# Patient Record
Sex: FEMALE | Race: WHITE | NOT HISPANIC OR LATINO | Employment: UNEMPLOYED | URBAN - METROPOLITAN AREA
[De-identification: names, ages, dates, MRNs, and addresses within clinical notes are randomized per-mention and may not be internally consistent; named-entity substitution may affect disease eponyms.]

---

## 2020-10-19 ENCOUNTER — TRANSCRIBE ORDERS (OUTPATIENT)
Dept: LAB | Facility: CLINIC | Age: 60
End: 2020-10-19

## 2020-10-19 ENCOUNTER — LAB (OUTPATIENT)
Dept: LAB | Facility: CLINIC | Age: 60
End: 2020-10-19

## 2020-10-19 DIAGNOSIS — E03.9 PRIMARY HYPOTHYROIDISM: ICD-10-CM

## 2020-10-19 DIAGNOSIS — Z00.00 ROUTINE GENERAL MEDICAL EXAMINATION AT A HEALTH CARE FACILITY: ICD-10-CM

## 2020-10-19 DIAGNOSIS — Z00.00 ROUTINE GENERAL MEDICAL EXAMINATION AT A HEALTH CARE FACILITY: Primary | ICD-10-CM

## 2020-10-19 DIAGNOSIS — E11.9 DIABETES MELLITUS, STABLE (HCC): ICD-10-CM

## 2020-10-19 LAB
ALBUMIN SERPL BCP-MCNC: 4.3 G/DL (ref 3.5–5)
ALP SERPL-CCNC: 92 U/L (ref 46–116)
ALT SERPL W P-5'-P-CCNC: 108 U/L (ref 12–78)
ANION GAP SERPL CALCULATED.3IONS-SCNC: 6 MMOL/L (ref 4–13)
AST SERPL W P-5'-P-CCNC: 46 U/L (ref 5–45)
BASOPHILS # BLD AUTO: 0.08 THOUSANDS/ΜL (ref 0–0.1)
BASOPHILS NFR BLD AUTO: 1 % (ref 0–1)
BILIRUB SERPL-MCNC: 0.58 MG/DL (ref 0.2–1)
BUN SERPL-MCNC: 14 MG/DL (ref 5–25)
CALCIUM SERPL-MCNC: 9.6 MG/DL (ref 8.3–10.1)
CHLORIDE SERPL-SCNC: 106 MMOL/L (ref 100–108)
CHOLEST SERPL-MCNC: 256 MG/DL (ref 50–200)
CO2 SERPL-SCNC: 28 MMOL/L (ref 21–32)
CREAT SERPL-MCNC: 0.73 MG/DL (ref 0.6–1.3)
EOSINOPHIL # BLD AUTO: 0.22 THOUSAND/ΜL (ref 0–0.61)
EOSINOPHIL NFR BLD AUTO: 3 % (ref 0–6)
ERYTHROCYTE [DISTWIDTH] IN BLOOD BY AUTOMATED COUNT: 11.8 % (ref 11.6–15.1)
GFR SERPL CREATININE-BSD FRML MDRD: 90 ML/MIN/1.73SQ M
GLUCOSE P FAST SERPL-MCNC: 104 MG/DL (ref 65–99)
HCT VFR BLD AUTO: 44.1 % (ref 34.8–46.1)
HDLC SERPL-MCNC: 35 MG/DL
HGB BLD-MCNC: 15.1 G/DL (ref 11.5–15.4)
IMM GRANULOCYTES # BLD AUTO: 0.01 THOUSAND/UL (ref 0–0.2)
IMM GRANULOCYTES NFR BLD AUTO: 0 % (ref 0–2)
LDLC SERPL CALC-MCNC: 149 MG/DL (ref 0–100)
LYMPHOCYTES # BLD AUTO: 2.83 THOUSANDS/ΜL (ref 0.6–4.47)
LYMPHOCYTES NFR BLD AUTO: 44 % (ref 14–44)
MCH RBC QN AUTO: 31.6 PG (ref 26.8–34.3)
MCHC RBC AUTO-ENTMCNC: 34.2 G/DL (ref 31.4–37.4)
MCV RBC AUTO: 92 FL (ref 82–98)
MONOCYTES # BLD AUTO: 0.79 THOUSAND/ΜL (ref 0.17–1.22)
MONOCYTES NFR BLD AUTO: 12 % (ref 4–12)
NEUTROPHILS # BLD AUTO: 2.61 THOUSANDS/ΜL (ref 1.85–7.62)
NEUTS SEG NFR BLD AUTO: 40 % (ref 43–75)
NONHDLC SERPL-MCNC: 221 MG/DL
NRBC BLD AUTO-RTO: 0 /100 WBCS
PLATELET # BLD AUTO: 323 THOUSANDS/UL (ref 149–390)
PMV BLD AUTO: 10.7 FL (ref 8.9–12.7)
POTASSIUM SERPL-SCNC: 3.7 MMOL/L (ref 3.5–5.3)
PROT SERPL-MCNC: 8.8 G/DL (ref 6.4–8.2)
RBC # BLD AUTO: 4.78 MILLION/UL (ref 3.81–5.12)
SODIUM SERPL-SCNC: 140 MMOL/L (ref 136–145)
TRIGL SERPL-MCNC: 360 MG/DL
TSH SERPL DL<=0.05 MIU/L-ACNC: 5.55 UIU/ML (ref 0.36–3.74)
WBC # BLD AUTO: 6.54 THOUSAND/UL (ref 4.31–10.16)

## 2020-10-19 PROCEDURE — 84443 ASSAY THYROID STIM HORMONE: CPT

## 2020-10-19 PROCEDURE — 85025 COMPLETE CBC W/AUTO DIFF WBC: CPT

## 2020-10-19 PROCEDURE — 80053 COMPREHEN METABOLIC PANEL: CPT

## 2020-10-19 PROCEDURE — 80061 LIPID PANEL: CPT

## 2020-10-19 PROCEDURE — 36415 COLL VENOUS BLD VENIPUNCTURE: CPT

## 2020-11-12 PROBLEM — R55 SYNCOPE AND COLLAPSE: Status: ACTIVE | Noted: 2020-11-12

## 2020-11-12 PROBLEM — E78.2 MIXED HYPERLIPIDEMIA: Status: ACTIVE | Noted: 2020-11-12

## 2020-11-16 ENCOUNTER — OFFICE VISIT (OUTPATIENT)
Dept: CARDIOLOGY CLINIC | Facility: CLINIC | Age: 60
End: 2020-11-16

## 2020-11-16 VITALS
HEIGHT: 63 IN | TEMPERATURE: 97.4 F | HEART RATE: 77 BPM | DIASTOLIC BLOOD PRESSURE: 84 MMHG | RESPIRATION RATE: 16 BRPM | SYSTOLIC BLOOD PRESSURE: 142 MMHG | WEIGHT: 156.2 LBS | BODY MASS INDEX: 27.68 KG/M2

## 2020-11-16 DIAGNOSIS — R55 SYNCOPE AND COLLAPSE: Primary | ICD-10-CM

## 2020-11-16 PROCEDURE — 99243 OFF/OP CNSLTJ NEW/EST LOW 30: CPT | Performed by: INTERNAL MEDICINE

## 2020-11-16 PROCEDURE — 93000 ELECTROCARDIOGRAM COMPLETE: CPT | Performed by: INTERNAL MEDICINE

## 2020-11-16 RX ORDER — MULTIVIT-MIN/IRON FUM/FOLIC AC 7.5 MG-4
1 TABLET ORAL DAILY
COMMUNITY

## 2020-11-16 RX ORDER — MELATONIN
1000 DAILY
COMMUNITY

## 2020-11-16 RX ORDER — LISINOPRIL 10 MG/1
10 TABLET ORAL DAILY
COMMUNITY
Start: 2020-10-29

## 2020-11-16 RX ORDER — MULTIVIT WITH MINERALS/LUTEIN
1000 TABLET ORAL DAILY
COMMUNITY

## 2020-11-16 RX ORDER — OMEPRAZOLE 20 MG/1
20 CAPSULE, DELAYED RELEASE ORAL DAILY
COMMUNITY

## 2020-11-16 RX ORDER — MECLIZINE HYDROCHLORIDE 25 MG/1
25 TABLET ORAL 3 TIMES DAILY PRN
COMMUNITY
Start: 2020-11-05

## 2021-04-09 DIAGNOSIS — Z23 ENCOUNTER FOR IMMUNIZATION: ICD-10-CM

## 2021-04-20 ENCOUNTER — IMMUNIZATIONS (OUTPATIENT)
Dept: FAMILY MEDICINE CLINIC | Facility: HOSPITAL | Age: 61
End: 2021-04-20

## 2021-04-20 DIAGNOSIS — Z23 ENCOUNTER FOR IMMUNIZATION: Primary | ICD-10-CM

## 2021-04-20 PROCEDURE — 91300 SARS-COV-2 / COVID-19 MRNA VACCINE (PFIZER-BIONTECH) 30 MCG: CPT

## 2021-04-20 PROCEDURE — 0001A SARS-COV-2 / COVID-19 MRNA VACCINE (PFIZER-BIONTECH) 30 MCG: CPT

## 2021-05-12 ENCOUNTER — IMMUNIZATIONS (OUTPATIENT)
Dept: FAMILY MEDICINE CLINIC | Facility: HOSPITAL | Age: 61
End: 2021-05-12

## 2021-05-12 DIAGNOSIS — Z23 ENCOUNTER FOR IMMUNIZATION: Primary | ICD-10-CM

## 2021-05-12 PROCEDURE — 0002A SARS-COV-2 / COVID-19 MRNA VACCINE (PFIZER-BIONTECH) 30 MCG: CPT

## 2021-05-12 PROCEDURE — 91300 SARS-COV-2 / COVID-19 MRNA VACCINE (PFIZER-BIONTECH) 30 MCG: CPT

## 2021-06-04 ENCOUNTER — APPOINTMENT (OUTPATIENT)
Dept: LAB | Facility: CLINIC | Age: 61
End: 2021-06-04

## 2021-06-04 ENCOUNTER — TRANSCRIBE ORDERS (OUTPATIENT)
Dept: LAB | Facility: CLINIC | Age: 61
End: 2021-06-04

## 2021-06-04 DIAGNOSIS — E78.5 HYPERLIPIDEMIA, UNSPECIFIED HYPERLIPIDEMIA TYPE: ICD-10-CM

## 2021-06-04 DIAGNOSIS — I10 ESSENTIAL HYPERTENSION, MALIGNANT: ICD-10-CM

## 2021-06-04 DIAGNOSIS — I10 ESSENTIAL HYPERTENSION, MALIGNANT: Primary | ICD-10-CM

## 2021-06-04 LAB
ALBUMIN SERPL BCP-MCNC: 4.2 G/DL (ref 3.5–5)
ALP SERPL-CCNC: 87 U/L (ref 46–116)
ALT SERPL W P-5'-P-CCNC: 66 U/L (ref 12–78)
ANION GAP SERPL CALCULATED.3IONS-SCNC: 6 MMOL/L (ref 4–13)
AST SERPL W P-5'-P-CCNC: 29 U/L (ref 5–45)
BILIRUB SERPL-MCNC: 0.56 MG/DL (ref 0.2–1)
BUN SERPL-MCNC: 11 MG/DL (ref 5–25)
CALCIUM SERPL-MCNC: 9.7 MG/DL (ref 8.3–10.1)
CHLORIDE SERPL-SCNC: 102 MMOL/L (ref 100–108)
CHOLEST SERPL-MCNC: 260 MG/DL (ref 50–200)
CO2 SERPL-SCNC: 31 MMOL/L (ref 21–32)
CREAT SERPL-MCNC: 0.7 MG/DL (ref 0.6–1.3)
GFR SERPL CREATININE-BSD FRML MDRD: 94 ML/MIN/1.73SQ M
GLUCOSE P FAST SERPL-MCNC: 103 MG/DL (ref 65–99)
HDLC SERPL-MCNC: 31 MG/DL
LDLC SERPL CALC-MCNC: 159 MG/DL (ref 0–100)
NONHDLC SERPL-MCNC: 229 MG/DL
POTASSIUM SERPL-SCNC: 3.8 MMOL/L (ref 3.5–5.3)
PROT SERPL-MCNC: 8.2 G/DL (ref 6.4–8.2)
SODIUM SERPL-SCNC: 139 MMOL/L (ref 136–145)
TRIGL SERPL-MCNC: 351 MG/DL

## 2021-06-04 PROCEDURE — 80061 LIPID PANEL: CPT

## 2021-06-04 PROCEDURE — 80053 COMPREHEN METABOLIC PANEL: CPT

## 2021-06-04 PROCEDURE — 36415 COLL VENOUS BLD VENIPUNCTURE: CPT

## 2021-09-30 ENCOUNTER — APPOINTMENT (OUTPATIENT)
Dept: LAB | Facility: CLINIC | Age: 61
End: 2021-09-30

## 2021-09-30 DIAGNOSIS — E78.5 HYPERLIPIDEMIA, UNSPECIFIED HYPERLIPIDEMIA TYPE: ICD-10-CM

## 2021-09-30 DIAGNOSIS — I10 ESSENTIAL HYPERTENSION, MALIGNANT: ICD-10-CM

## 2021-09-30 LAB
ALT SERPL W P-5'-P-CCNC: 43 U/L (ref 12–78)
AST SERPL W P-5'-P-CCNC: 17 U/L (ref 5–45)
CHOLEST SERPL-MCNC: 146 MG/DL (ref 50–200)
HDLC SERPL-MCNC: 32 MG/DL
LDLC SERPL CALC-MCNC: 79 MG/DL (ref 0–100)
NONHDLC SERPL-MCNC: 114 MG/DL
TRIGL SERPL-MCNC: 175 MG/DL

## 2021-09-30 PROCEDURE — 84460 ALANINE AMINO (ALT) (SGPT): CPT

## 2021-09-30 PROCEDURE — 84450 TRANSFERASE (AST) (SGOT): CPT

## 2021-09-30 PROCEDURE — 80061 LIPID PANEL: CPT

## 2021-09-30 PROCEDURE — 36415 COLL VENOUS BLD VENIPUNCTURE: CPT

## 2022-06-07 ENCOUNTER — APPOINTMENT (OUTPATIENT)
Dept: LAB | Facility: CLINIC | Age: 62
End: 2022-06-07

## 2022-06-07 DIAGNOSIS — E78.5 HYPERLIPIDEMIA, UNSPECIFIED HYPERLIPIDEMIA TYPE: ICD-10-CM

## 2022-06-07 DIAGNOSIS — Z00.00 ROUTINE GENERAL MEDICAL EXAMINATION AT A HEALTH CARE FACILITY: ICD-10-CM

## 2022-06-07 DIAGNOSIS — R73.01 IMPAIRED FASTING GLUCOSE: ICD-10-CM

## 2022-06-07 DIAGNOSIS — E03.9 HYPOTHYROIDISM, UNSPECIFIED TYPE: ICD-10-CM

## 2022-06-07 LAB
ALBUMIN SERPL BCP-MCNC: 4 G/DL (ref 3.5–5)
ALP SERPL-CCNC: 79 U/L (ref 46–116)
ALT SERPL W P-5'-P-CCNC: 42 U/L (ref 12–78)
ANION GAP SERPL CALCULATED.3IONS-SCNC: 6 MMOL/L (ref 4–13)
AST SERPL W P-5'-P-CCNC: 14 U/L (ref 5–45)
BASOPHILS # BLD AUTO: 0.06 THOUSANDS/ΜL (ref 0–0.1)
BASOPHILS NFR BLD AUTO: 1 % (ref 0–1)
BILIRUB SERPL-MCNC: 0.65 MG/DL (ref 0.2–1)
BUN SERPL-MCNC: 10 MG/DL (ref 5–25)
CALCIUM SERPL-MCNC: 10 MG/DL (ref 8.3–10.1)
CHLORIDE SERPL-SCNC: 105 MMOL/L (ref 100–108)
CO2 SERPL-SCNC: 29 MMOL/L (ref 21–32)
CREAT SERPL-MCNC: 0.68 MG/DL (ref 0.6–1.3)
EOSINOPHIL # BLD AUTO: 0.25 THOUSAND/ΜL (ref 0–0.61)
EOSINOPHIL NFR BLD AUTO: 3 % (ref 0–6)
ERYTHROCYTE [DISTWIDTH] IN BLOOD BY AUTOMATED COUNT: 11.7 % (ref 11.6–15.1)
EST. AVERAGE GLUCOSE BLD GHB EST-MCNC: 117 MG/DL
GFR SERPL CREATININE-BSD FRML MDRD: 94 ML/MIN/1.73SQ M
GLUCOSE P FAST SERPL-MCNC: 100 MG/DL (ref 65–99)
HBA1C MFR BLD: 5.7 %
HCT VFR BLD AUTO: 42.7 % (ref 34.8–46.1)
HGB BLD-MCNC: 14.4 G/DL (ref 11.5–15.4)
IMM GRANULOCYTES # BLD AUTO: 0.02 THOUSAND/UL (ref 0–0.2)
IMM GRANULOCYTES NFR BLD AUTO: 0 % (ref 0–2)
LYMPHOCYTES # BLD AUTO: 2.69 THOUSANDS/ΜL (ref 0.6–4.47)
LYMPHOCYTES NFR BLD AUTO: 33 % (ref 14–44)
MCH RBC QN AUTO: 30.5 PG (ref 26.8–34.3)
MCHC RBC AUTO-ENTMCNC: 33.7 G/DL (ref 31.4–37.4)
MCV RBC AUTO: 91 FL (ref 82–98)
MONOCYTES # BLD AUTO: 0.72 THOUSAND/ΜL (ref 0.17–1.22)
MONOCYTES NFR BLD AUTO: 9 % (ref 4–12)
NEUTROPHILS # BLD AUTO: 4.36 THOUSANDS/ΜL (ref 1.85–7.62)
NEUTS SEG NFR BLD AUTO: 54 % (ref 43–75)
NRBC BLD AUTO-RTO: 0 /100 WBCS
PLATELET # BLD AUTO: 329 THOUSANDS/UL (ref 149–390)
PMV BLD AUTO: 10 FL (ref 8.9–12.7)
POTASSIUM SERPL-SCNC: 4 MMOL/L (ref 3.5–5.3)
PROT SERPL-MCNC: 8.3 G/DL (ref 6.4–8.2)
RBC # BLD AUTO: 4.72 MILLION/UL (ref 3.81–5.12)
SODIUM SERPL-SCNC: 140 MMOL/L (ref 136–145)
TSH SERPL DL<=0.05 MIU/L-ACNC: 7.02 UIU/ML (ref 0.45–4.5)
WBC # BLD AUTO: 8.1 THOUSAND/UL (ref 4.31–10.16)

## 2022-06-07 PROCEDURE — 83036 HEMOGLOBIN GLYCOSYLATED A1C: CPT

## 2022-06-07 PROCEDURE — 85025 COMPLETE CBC W/AUTO DIFF WBC: CPT

## 2022-06-07 PROCEDURE — 80053 COMPREHEN METABOLIC PANEL: CPT

## 2022-06-07 PROCEDURE — 84443 ASSAY THYROID STIM HORMONE: CPT

## 2022-06-07 PROCEDURE — 36415 COLL VENOUS BLD VENIPUNCTURE: CPT

## 2022-09-19 ENCOUNTER — APPOINTMENT (OUTPATIENT)
Dept: LAB | Facility: CLINIC | Age: 62
End: 2022-09-19

## 2022-09-19 DIAGNOSIS — E03.9 MYXEDEMA HEART DISEASE: ICD-10-CM

## 2022-09-19 DIAGNOSIS — I51.9 MYXEDEMA HEART DISEASE: ICD-10-CM

## 2022-09-19 LAB — TSH SERPL DL<=0.05 MIU/L-ACNC: 0.04 UIU/ML (ref 0.45–4.5)

## 2022-09-19 PROCEDURE — 36415 COLL VENOUS BLD VENIPUNCTURE: CPT

## 2022-09-19 PROCEDURE — 84443 ASSAY THYROID STIM HORMONE: CPT

## 2022-10-24 ENCOUNTER — TELEPHONE (OUTPATIENT)
Dept: NEUROLOGY | Facility: CLINIC | Age: 62
End: 2022-10-24

## 2022-10-24 NOTE — TELEPHONE ENCOUNTER
THE [facility] to confirm patient's 10/27/2022 @ 2 pm appointment with Dr Mikala Reid at the [facility]  Call back number given [phone]

## 2023-06-21 ENCOUNTER — TELEPHONE (OUTPATIENT)
Dept: OTHER | Facility: OTHER | Age: 63
End: 2023-06-21

## 2023-06-21 NOTE — TELEPHONE ENCOUNTER
Cyndi Fried  [unfilled]  [unfilled]  [phone]  [email or web address]  English  English  Amelia Rankin MD     Mammogram Screening [facility] locations only):  Pap smear screening (Clinic vs [facility]):  PCP (Clinic vs [facility]):       Transportation:     Education: Left message with Ottoniel

## 2023-09-07 ENCOUNTER — APPOINTMENT (OUTPATIENT)
Dept: LAB | Facility: CLINIC | Age: 63
End: 2023-09-07

## 2023-09-07 ENCOUNTER — TRANSCRIBE ORDERS (OUTPATIENT)
Dept: LAB | Facility: CLINIC | Age: 63
End: 2023-09-07

## 2023-09-07 DIAGNOSIS — I51.9 MYXEDEMA HEART DISEASE: Primary | ICD-10-CM

## 2023-09-07 DIAGNOSIS — E03.9 MYXEDEMA HEART DISEASE: ICD-10-CM

## 2023-09-07 DIAGNOSIS — I10 ESSENTIAL HYPERTENSION, MALIGNANT: ICD-10-CM

## 2023-09-07 DIAGNOSIS — I51.9 MYXEDEMA HEART DISEASE: ICD-10-CM

## 2023-09-07 DIAGNOSIS — E03.9 MYXEDEMA HEART DISEASE: Primary | ICD-10-CM

## 2023-09-07 LAB
ANION GAP SERPL CALCULATED.3IONS-SCNC: 7 MMOL/L
BUN SERPL-MCNC: 15 MG/DL (ref 5–25)
CALCIUM SERPL-MCNC: 9.8 MG/DL (ref 8.4–10.2)
CHLORIDE SERPL-SCNC: 102 MMOL/L (ref 96–108)
CO2 SERPL-SCNC: 30 MMOL/L (ref 21–32)
CREAT SERPL-MCNC: 0.73 MG/DL (ref 0.6–1.3)
GFR SERPL CREATININE-BSD FRML MDRD: 88 ML/MIN/1.73SQ M
GLUCOSE P FAST SERPL-MCNC: 94 MG/DL (ref 65–99)
POTASSIUM SERPL-SCNC: 4.1 MMOL/L (ref 3.5–5.3)
SODIUM SERPL-SCNC: 139 MMOL/L (ref 135–147)
TSH SERPL DL<=0.05 MIU/L-ACNC: 5.27 UIU/ML (ref 0.45–4.5)

## 2023-09-07 PROCEDURE — 80048 BASIC METABOLIC PNL TOTAL CA: CPT

## 2023-09-07 PROCEDURE — 36415 COLL VENOUS BLD VENIPUNCTURE: CPT

## 2023-09-07 PROCEDURE — 84443 ASSAY THYROID STIM HORMONE: CPT

## 2024-01-19 ENCOUNTER — APPOINTMENT (OUTPATIENT)
Dept: LAB | Facility: CLINIC | Age: 64
End: 2024-01-19
Payer: COMMERCIAL

## 2024-01-19 ENCOUNTER — TRANSCRIBE ORDERS (OUTPATIENT)
Dept: LAB | Facility: CLINIC | Age: 64
End: 2024-01-19

## 2024-01-19 DIAGNOSIS — E03.9 MYXEDEMA HEART DISEASE: Primary | ICD-10-CM

## 2024-01-19 DIAGNOSIS — I51.9 MYXEDEMA HEART DISEASE: ICD-10-CM

## 2024-01-19 DIAGNOSIS — I51.9 MYXEDEMA HEART DISEASE: Primary | ICD-10-CM

## 2024-01-19 DIAGNOSIS — E03.9 MYXEDEMA HEART DISEASE: ICD-10-CM

## 2024-01-19 LAB — TSH SERPL DL<=0.05 MIU/L-ACNC: 3.8 UIU/ML (ref 0.45–4.5)

## 2024-01-19 PROCEDURE — 84443 ASSAY THYROID STIM HORMONE: CPT

## 2024-01-19 PROCEDURE — 36415 COLL VENOUS BLD VENIPUNCTURE: CPT

## 2024-01-21 ENCOUNTER — HOSPITAL ENCOUNTER (EMERGENCY)
Facility: HOSPITAL | Age: 64
Discharge: HOME | End: 2024-01-21
Attending: INTERNAL MEDICINE
Payer: COMMERCIAL

## 2024-01-21 VITALS
TEMPERATURE: 97.5 F | RESPIRATION RATE: 16 BRPM | DIASTOLIC BLOOD PRESSURE: 81 MMHG | OXYGEN SATURATION: 98 % | WEIGHT: 138.2 LBS | SYSTOLIC BLOOD PRESSURE: 181 MMHG | HEART RATE: 81 BPM | HEIGHT: 62 IN | BODY MASS INDEX: 25.43 KG/M2

## 2024-01-21 DIAGNOSIS — N39.0 URINARY TRACT INFECTION: Primary | ICD-10-CM

## 2024-01-21 LAB
BACTERIA UR QL AUTO: ABNORMAL /HPF
BILIRUB UR QL STRIP: NEGATIVE
CLARITY UR: CLEAR
COLOR UR: YELLOW
GLUCOSE UR STRIP-MCNC: NEGATIVE MG/DL
HGB UR QL STRIP.AUTO: ABNORMAL
KETONES UR STRIP-MCNC: NEGATIVE MG/DL
LEUKOCYTE ESTERASE UR QL STRIP: ABNORMAL
NITRITE UR QL STRIP: NEGATIVE
NON-SQ EPI CELLS URNS QL MICRO: ABNORMAL /HPF
PH UR STRIP.AUTO: 6.5 [PH]
PROT UR STRIP-MCNC: NEGATIVE MG/DL
RBC #/AREA URNS AUTO: ABNORMAL /HPF
SP GR UR STRIP.AUTO: 1.01 (ref 1–1.03)
UROBILINOGEN UR QL STRIP.AUTO: 0.2 E.U./DL
WBC #/AREA URNS AUTO: ABNORMAL /HPF

## 2024-01-21 PROCEDURE — 81003 URINALYSIS AUTO W/O SCOPE: CPT | Performed by: INTERNAL MEDICINE

## 2024-01-21 PROCEDURE — 99283 EMERGENCY DEPT VISIT LOW MDM: CPT

## 2024-01-21 PROCEDURE — 87086 URINE CULTURE/COLONY COUNT: CPT | Performed by: INTERNAL MEDICINE

## 2024-01-21 PROCEDURE — 81001 URINALYSIS AUTO W/SCOPE: CPT | Performed by: INTERNAL MEDICINE

## 2024-01-21 PROCEDURE — 99284 EMERGENCY DEPT VISIT MOD MDM: CPT | Performed by: INTERNAL MEDICINE

## 2024-01-21 RX ORDER — SULFAMETHOXAZOLE AND TRIMETHOPRIM 800; 160 MG/1; MG/1
1 TABLET ORAL ONCE
Status: COMPLETED | OUTPATIENT
Start: 2024-01-21 | End: 2024-01-21

## 2024-01-21 RX ORDER — SULFAMETHOXAZOLE AND TRIMETHOPRIM 800; 160 MG/1; MG/1
1 TABLET ORAL 2 TIMES DAILY
Qty: 14 TABLET | Refills: 0 | Status: SHIPPED | OUTPATIENT
Start: 2024-01-21 | End: 2024-01-28

## 2024-01-21 RX ADMIN — SULFAMETHOXAZOLE AND TRIMETHOPRIM 1 TABLET: 800; 160 TABLET ORAL at 10:59

## 2024-01-21 NOTE — DISCHARGE INSTRUCTIONS
Take medication as prescribed.  Drink plenty of water.  Follow-up with your PMD.  Labs Reviewed   UA W REFLEX TO MICROSCOPIC WITH REFLEX TO CULTURE - Abnormal       Result Value Ref Range Status    Color, UA Yellow  Yellow Final    Clarity, UA Clear  Clear Final    Specific Gravity, UA 1.010  1.001 - 1.030 Final    pH, UA 6.5  5.0, 5.5, 6.0, 6.5, 7.0, 7.5, 8.0 Final    Leukocytes, UA 2+ (*) Negative Final    Nitrite, UA Negative  Negative Final    Protein, UA Negative  Negative, Interference- unable to analyze mg/dl Final    Glucose, UA Negative  Negative mg/dl Final    Ketones, UA Negative  Negative mg/dl Final    Urobilinogen, UA 0.2  0.2, 1.0 E.U./dl E.U./dl Final    Bilirubin, UA Negative  Negative Final    Occult Blood, UA 1+ (*) Negative Final   URINE MICROSCOPIC - Abnormal    RBC, UA 10-20 (*) None Seen, 0-1, 1-2, 2-4, 0-5 /hpf Final    WBC, UA 30-50 (*) None Seen, 0-1, 1-2, 0-5, 2-4 /hpf Final    Epithelial Cells Moderate (*) None Seen, Occasional /hpf Final    Bacteria, UA Occasional  None Seen, Occasional /hpf Final   URINE CULTURE

## 2024-01-21 NOTE — ED PROVIDER NOTES
History  Chief Complaint   Patient presents with    Blood in Urine     Hematuria, dysuria, frequency since yesterday     This is a 63 years old came for having polyuria dysuria.  Patient found blood in the urine.  Patient has suprapubic fullness.  Patient has history of UTIs almost every year.  Last UTI was last week.  Patient has no fever.  Patient has no flank pain.  Patient has no abdominal pain.  Patient has history of hypertension hypercholesterolemia and thyroid problem.  Patient sitting in no distress.  Patient has no other complaints.  Patient denies any history of kidney stones before.        Prior to Admission Medications   Prescriptions Last Dose Informant Patient Reported? Taking?   Ascorbic Acid (vitamin C) 1000 MG tablet  Self Yes No   Sig: Take 1,000 mg by mouth daily   Multiple Vitamins-Minerals (multivitamin with minerals) tablet  Self Yes No   Sig: Take 1 tablet by mouth daily   cholecalciferol (VITAMIN D3) 1,000 units tablet  Self Yes No   Sig: Take 1,000 Units by mouth daily   lisinopril (ZESTRIL) 10 mg tablet  Self Yes No   Sig: Take 10 mg by mouth daily   meclizine (ANTIVERT) 25 mg tablet  Self Yes No   Sig: Take 25 mg by mouth 3 (three) times a day as needed   omeprazole (PriLOSEC) 20 mg delayed release capsule  Self Yes No   Sig: Take 20 mg by mouth daily   vitamin E 100 UNIT capsule  Self Yes No   Sig: Take 100 Units by mouth daily      Facility-Administered Medications: None       Past Medical History:   Diagnosis Date    Disease of thyroid gland     High cholesterol     Hypertension     Vertigo        History reviewed. No pertinent surgical history.    History reviewed. No pertinent family history.  I have reviewed and agree with the history as documented.    E-Cigarette/Vaping    E-Cigarette Use Never Assessed      E-Cigarette/Vaping Substances     Social History     Tobacco Use    Smoking status: Never    Smokeless tobacco: Never   Substance Use Topics    Alcohol use: Not Currently     Drug use: Not Currently       Review of Systems   Constitutional:  Negative for fatigue and fever.   HENT:  Negative for congestion, sinus pressure, sinus pain, sneezing, sore throat and tinnitus.    Respiratory:  Negative for cough and shortness of breath.    Cardiovascular:  Negative for chest pain and palpitations.   Gastrointestinal:  Negative for abdominal pain, diarrhea, nausea and vomiting.   Endocrine: Positive for polyuria.   Genitourinary:  Positive for dysuria and hematuria. Negative for difficulty urinating, flank pain and frequency.   Musculoskeletal:  Negative for back pain, myalgias, neck pain and neck stiffness.   Skin:  Negative for color change, pallor and rash.   Neurological:  Negative for dizziness, light-headedness and headaches.   Psychiatric/Behavioral:  Negative for agitation and behavioral problems.        Physical Exam  Physical Exam  Vitals and nursing note reviewed.   Constitutional:       General: She is not in acute distress.     Appearance: She is well-developed. She is not ill-appearing, toxic-appearing or diaphoretic.   HENT:      Head: Normocephalic and atraumatic.      Right Ear: Ear canal normal.      Left Ear: Ear canal normal.      Nose: Nose normal. No congestion or rhinorrhea.      Mouth/Throat:      Pharynx: No oropharyngeal exudate or posterior oropharyngeal erythema.   Eyes:      Extraocular Movements: Extraocular movements intact.      Pupils: Pupils are equal, round, and reactive to light.   Neck:      Vascular: No carotid bruit.   Cardiovascular:      Rate and Rhythm: Normal rate and regular rhythm.      Heart sounds: Normal heart sounds. No murmur heard.     No friction rub. No gallop.   Pulmonary:      Effort: Pulmonary effort is normal. No respiratory distress.      Breath sounds: Normal breath sounds. No stridor. No wheezing, rhonchi or rales.   Chest:      Chest wall: No tenderness.   Abdominal:      General: Bowel sounds are normal. There is no distension.       Palpations: Abdomen is soft. There is no mass.      Tenderness: There is no abdominal tenderness. There is no right CVA tenderness, left CVA tenderness, guarding or rebound.      Hernia: No hernia is present.   Musculoskeletal:         General: No swelling, tenderness, deformity or signs of injury. Normal range of motion.      Cervical back: Normal range of motion and neck supple. No rigidity or tenderness.      Right lower leg: No edema.      Left lower leg: No edema.   Lymphadenopathy:      Cervical: No cervical adenopathy.   Skin:     General: Skin is warm and dry.      Capillary Refill: Capillary refill takes less than 2 seconds.      Coloration: Skin is not jaundiced or pale.      Findings: No bruising, erythema, lesion or rash.   Neurological:      General: No focal deficit present.      Mental Status: She is alert and oriented to person, place, and time.   Psychiatric:         Behavior: Behavior normal.         Vital Signs  ED Triage Vitals   Temperature Pulse Respirations Blood Pressure SpO2   01/21/24 0915 01/21/24 0915 01/21/24 0915 01/21/24 0918 01/21/24 0915   97.5 °F (36.4 °C) 81 16 (!) 181/81 98 %      Temp Source Heart Rate Source Patient Position - Orthostatic VS BP Location FiO2 (%)   01/21/24 0915 01/21/24 0915 01/21/24 0915 01/21/24 0915 --   Oral Monitor Sitting Right arm       Pain Score       01/21/24 0918       6           Vitals:    01/21/24 0915 01/21/24 0918   BP:  (!) 181/81   Pulse: 81    Patient Position - Orthostatic VS: Sitting          Visual Acuity      ED Medications  Medications   sulfamethoxazole-trimethoprim (BACTRIM DS) 800-160 mg per tablet 1 tablet (1 tablet Oral Given 1/21/24 1059)       Diagnostic Studies  Results Reviewed       Procedure Component Value Units Date/Time    Urine culture [[id number]] Collected: 01/21/24 0931    Lab Status: Final result Specimen: Urine, Clean Catch Updated: 01/23/24 0918     Urine Culture 60,000-69,000 cfu/ml    Urine Microscopic [[id number]]   (Abnormal) Collected: 01/21/24 0931    Lab Status: Final result Specimen: Urine, Clean Catch Updated: 01/21/24 1007     RBC, UA 10-20 /hpf      WBC, UA 30-50 /hpf      Epithelial Cells Moderate /hpf      Bacteria, UA Occasional /hpf     UA w Reflex to Microscopic w Reflex to Culture [[id number]]  (Abnormal) Collected: 01/21/24 0931    Lab Status: Final result Specimen: Urine, Clean Catch Updated: 01/21/24 0937     Color, UA Yellow     Clarity, UA Clear     Specific Gravity, UA 1.010     pH, UA 6.5     Leukocytes, UA 2+     Nitrite, UA Negative     Protein, UA Negative mg/dl      Glucose, UA Negative mg/dl      Ketones, UA Negative mg/dl      Urobilinogen, UA 0.2 E.U./dl      Bilirubin, UA Negative     Occult Blood, UA 1+                   No orders to display              Procedures  Procedures         ED Course                                             Medical Decision Making  This is a 63 years old came for having polyuria dysuria.  Patient has history of UTIs in the past.  Patient found some blood yesterday.  Symptoms started since last night.  Patient denies any fever, flank pain, abdominal pain.  Patient has some fullness of suprapubic area.  Physical exam shows no pertinent positive findings.  UA shows WBCs 30-50, RBCs 10-20.  Patient has UTI we started her on Bactrim.  Will give Bactrim for 7 days and advised to drink plenty of water.  All question concerns of patient having further addressed.    Amount and/or Complexity of Data Reviewed  Labs: ordered.     Details: UA shows; WBC is 30-50, RBCs 10-20.    Risk  Prescription drug management.             Disposition  Final diagnoses:   Urinary tract infection     Time reflects when diagnosis was documented in both MDM as applicable and the Disposition within this note       Time User Action Codes Description Comment    1/21/2024 11:01 AM Abiel Reyna Add [N39.0] Urinary tract infection           ED Disposition       ED Disposition   Discharge    Condition    Stable    Date/Time   Sun Jan 21, 2024 11:01 AM    Comment   Siena LUIS Olmedo discharge to home/self care.                   Follow-up Information       Follow up With Specialties Details Why Contact Info    Monty Mora MD Internal Medicine In 3 days  [address] [place] [address]  Katherine JACK [id number]  [phone]              Discharge Medication List as of 1/21/2024 11:03 AM        START taking these medications    Details   sulfamethoxazole-trimethoprim (BACTRIM DS) 800-160 mg per tablet Take 1 tablet by mouth 2 (two) times a day for 7 days smx-tmp DS (BACTRIM) 800-160 mg tabs (1tab q12 D10), Starting Sun 1/21/2024, Until Sun 1/28/2024, Normal           CONTINUE these medications which have NOT CHANGED    Details   Ascorbic Acid (vitamin C) 1000 MG tablet Take 1,000 mg by mouth daily, Historical Med      cholecalciferol (VITAMIN D3) 1,000 units tablet Take 1,000 Units by mouth daily, Historical Med      lisinopril (ZESTRIL) 10 mg tablet Take 10 mg by mouth daily, Starting Thu 10/29/2020, Historical Med      meclizine (ANTIVERT) 25 mg tablet Take 25 mg by mouth 3 (three) times a day as needed, Starting Thu 11/5/2020, Historical Med      Multiple Vitamins-Minerals (multivitamin with minerals) tablet Take 1 tablet by mouth daily, Historical Med      omeprazole (PriLOSEC) 20 mg delayed release capsule Take 20 mg by mouth daily, Historical Med      vitamin E 100 UNIT capsule Take 100 Units by mouth daily, Historical Med             No discharge procedures on file.    PDMP Review       None            ED Provider  Electronically Signed by             Abiel Reyna MD  01/23/24 2573

## 2024-01-23 LAB — BACTERIA UR CULT: NORMAL

## 2024-01-26 ENCOUNTER — APPOINTMENT (EMERGENCY)
Dept: CT IMAGING | Facility: HOSPITAL | Age: 64
End: 2024-01-26
Payer: COMMERCIAL

## 2024-01-26 ENCOUNTER — HOSPITAL ENCOUNTER (EMERGENCY)
Facility: HOSPITAL | Age: 64
Discharge: HOME | End: 2024-01-26
Attending: EMERGENCY MEDICINE
Payer: COMMERCIAL

## 2024-01-26 VITALS
HEART RATE: 88 BPM | WEIGHT: 138 LBS | BODY MASS INDEX: 25.4 KG/M2 | HEIGHT: 62 IN | DIASTOLIC BLOOD PRESSURE: 52 MMHG | RESPIRATION RATE: 20 BRPM | SYSTOLIC BLOOD PRESSURE: 124 MMHG | OXYGEN SATURATION: 100 % | TEMPERATURE: 97.8 F

## 2024-01-26 DIAGNOSIS — R10.10 PAIN OF UPPER ABDOMEN: Primary | ICD-10-CM

## 2024-01-26 LAB
ALBUMIN SERPL BCP-MCNC: 4.3 G/DL (ref 3.5–5)
ALP SERPL-CCNC: 62 U/L (ref 34–104)
ALT SERPL W P-5'-P-CCNC: 24 U/L (ref 7–52)
ANION GAP SERPL CALCULATED.3IONS-SCNC: 9 MMOL/L
AST SERPL W P-5'-P-CCNC: 23 U/L (ref 13–39)
BASOPHILS # BLD AUTO: 0 THOUSANDS/ÂΜL (ref 0–0.1)
BASOPHILS NFR BLD AUTO: 0 % (ref 0–1)
BILIRUB SERPL-MCNC: 0.34 MG/DL (ref 0.2–1)
BILIRUB UR QL STRIP: NEGATIVE
BUN SERPL-MCNC: 17 MG/DL (ref 5–25)
CALCIUM SERPL-MCNC: 9.2 MG/DL (ref 8.4–10.2)
CARDIAC TROPONIN I PNL SERPL HS: 4 NG/L
CHLORIDE SERPL-SCNC: 98 MMOL/L (ref 96–108)
CLARITY UR: CLEAR
CO2 SERPL-SCNC: 25 MMOL/L (ref 21–32)
COLOR UR: YELLOW
CREAT SERPL-MCNC: 1.19 MG/DL (ref 0.6–1.3)
EOSINOPHIL # BLD AUTO: 0.28 THOUSAND/ÂΜL (ref 0–0.61)
EOSINOPHIL NFR BLD AUTO: 8 % (ref 0–6)
ERYTHROCYTE [DISTWIDTH] IN BLOOD BY AUTOMATED COUNT: 11.9 % (ref 11.6–15.1)
FLUAV RNA RESP QL NAA+PROBE: NEGATIVE
FLUBV RNA RESP QL NAA+PROBE: NEGATIVE
GFR SERPL CREATININE-BSD FRML MDRD: 49 ML/MIN/1.73SQ M
GLUCOSE SERPL-MCNC: 106 MG/DL (ref 65–140)
GLUCOSE UR STRIP-MCNC: NEGATIVE MG/DL
HCT VFR BLD AUTO: 36.1 % (ref 34.8–46.1)
HGB BLD-MCNC: 12.2 G/DL (ref 11.5–15.4)
HGB UR QL STRIP.AUTO: NEGATIVE
IMM GRANULOCYTES # BLD AUTO: 0.01 THOUSAND/UL (ref 0–0.2)
IMM GRANULOCYTES NFR BLD AUTO: 0 % (ref 0–2)
KETONES UR STRIP-MCNC: NEGATIVE MG/DL
LEUKOCYTE ESTERASE UR QL STRIP: NEGATIVE
LIPASE SERPL-CCNC: 27 U/L (ref 11–82)
LYMPHOCYTES # BLD AUTO: 0.54 THOUSANDS/ÂΜL (ref 0.6–4.47)
LYMPHOCYTES NFR BLD AUTO: 15 % (ref 14–44)
MCH RBC QN AUTO: 31.5 PG (ref 26.8–34.3)
MCHC RBC AUTO-ENTMCNC: 33.8 G/DL (ref 31.4–37.4)
MCV RBC AUTO: 93 FL (ref 82–98)
MONOCYTES # BLD AUTO: 0.56 THOUSAND/ÂΜL (ref 0.17–1.22)
MONOCYTES NFR BLD AUTO: 16 % (ref 4–12)
NEUTROPHILS # BLD AUTO: 2.21 THOUSANDS/ÂΜL (ref 1.85–7.62)
NEUTS SEG NFR BLD AUTO: 61 % (ref 43–75)
NITRITE UR QL STRIP: NEGATIVE
NRBC BLD AUTO-RTO: 0 /100 WBCS
PH UR STRIP.AUTO: 6.5 [PH]
PLATELET # BLD AUTO: 193 THOUSANDS/UL (ref 149–390)
PMV BLD AUTO: 9.4 FL (ref 8.9–12.7)
POTASSIUM SERPL-SCNC: 3.6 MMOL/L (ref 3.5–5.3)
PROT SERPL-MCNC: 7.8 G/DL (ref 6.4–8.4)
PROT UR STRIP-MCNC: NEGATIVE MG/DL
RBC # BLD AUTO: 3.87 MILLION/UL (ref 3.81–5.12)
RSV RNA RESP QL NAA+PROBE: NEGATIVE
SARS-COV-2 RNA RESP QL NAA+PROBE: NEGATIVE
SODIUM SERPL-SCNC: 132 MMOL/L (ref 135–147)
SP GR UR STRIP.AUTO: <=1.005 (ref 1–1.03)
UROBILINOGEN UR QL STRIP.AUTO: 0.2 E.U./DL
WBC # BLD AUTO: 3.6 THOUSAND/UL (ref 4.31–10.16)

## 2024-01-26 PROCEDURE — 84484 ASSAY OF TROPONIN QUANT: CPT | Performed by: EMERGENCY MEDICINE

## 2024-01-26 PROCEDURE — G1004 CDSM NDSC: HCPCS

## 2024-01-26 PROCEDURE — 96375 TX/PRO/DX INJ NEW DRUG ADDON: CPT

## 2024-01-26 PROCEDURE — 80053 COMPREHEN METABOLIC PANEL: CPT | Performed by: EMERGENCY MEDICINE

## 2024-01-26 PROCEDURE — 36415 COLL VENOUS BLD VENIPUNCTURE: CPT | Performed by: EMERGENCY MEDICINE

## 2024-01-26 PROCEDURE — 99284 EMERGENCY DEPT VISIT MOD MDM: CPT

## 2024-01-26 PROCEDURE — 85025 COMPLETE CBC W/AUTO DIFF WBC: CPT | Performed by: EMERGENCY MEDICINE

## 2024-01-26 PROCEDURE — 96374 THER/PROPH/DIAG INJ IV PUSH: CPT

## 2024-01-26 PROCEDURE — 93005 ELECTROCARDIOGRAM TRACING: CPT

## 2024-01-26 PROCEDURE — 0241U HB NFCT DS VIR RESP RNA 4 TRGT: CPT | Performed by: EMERGENCY MEDICINE

## 2024-01-26 PROCEDURE — 96361 HYDRATE IV INFUSION ADD-ON: CPT

## 2024-01-26 PROCEDURE — 83690 ASSAY OF LIPASE: CPT | Performed by: EMERGENCY MEDICINE

## 2024-01-26 PROCEDURE — 99285 EMERGENCY DEPT VISIT HI MDM: CPT | Performed by: EMERGENCY MEDICINE

## 2024-01-26 PROCEDURE — 81003 URINALYSIS AUTO W/O SCOPE: CPT | Performed by: EMERGENCY MEDICINE

## 2024-01-26 PROCEDURE — 74177 CT ABD & PELVIS W/CONTRAST: CPT

## 2024-01-26 RX ORDER — ONDANSETRON 2 MG/ML
4 INJECTION INTRAMUSCULAR; INTRAVENOUS ONCE
Status: COMPLETED | OUTPATIENT
Start: 2024-01-26 | End: 2024-01-26

## 2024-01-26 RX ORDER — LIDOCAINE 50 MG/G
1 PATCH TOPICAL ONCE
Status: DISCONTINUED | OUTPATIENT
Start: 2024-01-26 | End: 2024-01-26 | Stop reason: HOSPADM

## 2024-01-26 RX ORDER — LIDOCAINE 50 MG/G
1 PATCH TOPICAL DAILY
Qty: 5 PATCH | Refills: 0 | Status: SHIPPED | OUTPATIENT
Start: 2024-01-26 | End: 2024-01-31

## 2024-01-26 RX ORDER — MAGNESIUM HYDROXIDE/ALUMINUM HYDROXICE/SIMETHICONE 120; 1200; 1200 MG/30ML; MG/30ML; MG/30ML
30 SUSPENSION ORAL ONCE
Status: COMPLETED | OUTPATIENT
Start: 2024-01-26 | End: 2024-01-26

## 2024-01-26 RX ORDER — KETOROLAC TROMETHAMINE 30 MG/ML
15 INJECTION, SOLUTION INTRAMUSCULAR; INTRAVENOUS ONCE
Status: COMPLETED | OUTPATIENT
Start: 2024-01-26 | End: 2024-01-26

## 2024-01-26 RX ORDER — SUCRALFATE 1 G/1
1 TABLET ORAL ONCE
Status: COMPLETED | OUTPATIENT
Start: 2024-01-26 | End: 2024-01-26

## 2024-01-26 RX ORDER — SUCRALFATE 1 G/1
1 TABLET ORAL 4 TIMES DAILY
Qty: 28 TABLET | Refills: 0 | Status: SHIPPED | OUTPATIENT
Start: 2024-01-26 | End: 2024-02-02

## 2024-01-26 RX ORDER — FAMOTIDINE 20 MG/1
20 TABLET, FILM COATED ORAL DAILY
Qty: 5 TABLET | Refills: 0 | Status: SHIPPED | OUTPATIENT
Start: 2024-01-26 | End: 2024-01-31

## 2024-01-26 RX ORDER — ACETAMINOPHEN 325 MG/1
975 TABLET ORAL ONCE
Status: COMPLETED | OUTPATIENT
Start: 2024-01-26 | End: 2024-01-26

## 2024-01-26 RX ORDER — DICYCLOMINE HCL 20 MG
20 TABLET ORAL EVERY 6 HOURS PRN
Qty: 20 TABLET | Refills: 0 | Status: SHIPPED | OUTPATIENT
Start: 2024-01-26 | End: 2024-01-31

## 2024-01-26 RX ADMIN — ACETAMINOPHEN 975 MG: 325 TABLET, FILM COATED ORAL at 17:04

## 2024-01-26 RX ADMIN — IOHEXOL 100 ML: 350 INJECTION, SOLUTION INTRAVENOUS at 15:59

## 2024-01-26 RX ADMIN — ALUMINUM HYDROXIDE, MAGNESIUM HYDROXIDE, AND SIMETHICONE 30 ML: 200; 200; 20 SUSPENSION ORAL at 17:05

## 2024-01-26 RX ADMIN — SUCRALFATE 1 G: 1 TABLET ORAL at 17:04

## 2024-01-26 RX ADMIN — SODIUM CHLORIDE 1000 ML: 0.9 INJECTION, SOLUTION INTRAVENOUS at 15:03

## 2024-01-26 RX ADMIN — KETOROLAC TROMETHAMINE 15 MG: 30 INJECTION, SOLUTION INTRAMUSCULAR at 15:03

## 2024-01-26 RX ADMIN — LIDOCAINE 1 PATCH: 50 PATCH CUTANEOUS at 17:04

## 2024-01-26 RX ADMIN — ONDANSETRON 4 MG: 2 INJECTION INTRAMUSCULAR; INTRAVENOUS at 15:05

## 2024-01-26 NOTE — ED PROVIDER NOTES
"History  Chief Complaint   Patient presents with    Abdominal Pain     Pt \"I just started having this really bad lower abd pain that wraps around to my back. I called my PCP and they told me to come right here\" Pt was seen on Sunday for UTI and taking Bactrim.      63-year-old female with history of hypertension, hyperlipidemia presenting for evaluation of abdominal pain.  Patient reports onset of pain approximately 3 hours prior to arrival.  She complains of upper abdominal pain that radiates to bilateral flanks.  Her pain was gradual in onset and she does not describe it as ripping or tearing.  She has no lower abdominal pain.  She was evaluated in the emergency department 5 days ago at which time she was found to have a UTI and has been on Bactrim.  She has had some associated nausea but no vomiting.  She had a fever last night but no fever today.  She otherwise denies chest pain, shortness of breath, diarrhea.  She has not had any abdominal surgeries.  She tried taking Advil for her symptoms prior to arrival with minimal relief.        Prior to Admission Medications   Prescriptions Last Dose Informant Patient Reported? Taking?   Ascorbic Acid (vitamin C) 1000 MG tablet  Self Yes Yes   Sig: Take 1,000 mg by mouth daily   Multiple Vitamins-Minerals (multivitamin with minerals) tablet  Self Yes Yes   Sig: Take 1 tablet by mouth daily   cholecalciferol (VITAMIN D3) 1,000 units tablet  Self Yes Yes   Sig: Take 1,000 Units by mouth daily   lisinopril (ZESTRIL) 10 mg tablet  Self Yes Yes   Sig: Take 10 mg by mouth daily   meclizine (ANTIVERT) 25 mg tablet  Self Yes Yes   Sig: Take 25 mg by mouth 3 (three) times a day as needed   omeprazole (PriLOSEC) 20 mg delayed release capsule  Self Yes Yes   Sig: Take 20 mg by mouth daily   sulfamethoxazole-trimethoprim (BACTRIM DS) 800-160 mg per tablet   No Yes   Sig: Take 1 tablet by mouth 2 (two) times a day for 7 days smx-tmp DS (BACTRIM) 800-160 mg tabs (1tab q12 D10) "   vitamin E 100 UNIT capsule  Self Yes Yes   Sig: Take 100 Units by mouth daily      Facility-Administered Medications: None       Past Medical History:   Diagnosis Date    Disease of thyroid gland     High cholesterol     Hypertension     Vertigo        History reviewed. No pertinent surgical history.    History reviewed. No pertinent family history.  I have reviewed and agree with the history as documented.    E-Cigarette/Vaping    E-Cigarette Use Never Assessed      E-Cigarette/Vaping Substances     Social History     Tobacco Use    Smoking status: Never    Smokeless tobacco: Never   Substance Use Topics    Alcohol use: Not Currently    Drug use: Not Currently       Review of Systems   Constitutional:  Positive for fever. Negative for chills.   Respiratory:  Negative for cough and shortness of breath.    Cardiovascular:  Negative for chest pain.   Gastrointestinal:  Positive for abdominal pain and nausea. Negative for diarrhea and vomiting.   Genitourinary:  Positive for flank pain. Negative for dysuria and frequency.   Musculoskeletal:  Negative for gait problem.   Skin:  Negative for rash.   Neurological:  Negative for weakness and light-headedness.   All other systems reviewed and are negative.      Physical Exam  Physical Exam  Vitals and nursing note reviewed.   Constitutional:       General: She is not in acute distress.     Appearance: She is well-developed. She is not ill-appearing.   HENT:      Head: Normocephalic and atraumatic.      Nose: Nose normal.      Mouth/Throat:      Mouth: Mucous membranes are moist.   Eyes:      Conjunctiva/sclera: Conjunctivae normal.   Cardiovascular:      Rate and Rhythm: Normal rate and regular rhythm.      Heart sounds: No murmur heard.     No friction rub. No gallop.   Pulmonary:      Effort: Pulmonary effort is normal.      Breath sounds: Normal breath sounds. No wheezing, rhonchi or rales.   Abdominal:      General: There is no distension.      Palpations: Abdomen is  soft.      Tenderness: There is abdominal tenderness in the right upper quadrant, epigastric area and left upper quadrant. There is right CVA tenderness and left CVA tenderness. There is no guarding or rebound.   Musculoskeletal:         General: No swelling or tenderness. Normal range of motion.      Cervical back: Normal range of motion and neck supple.   Skin:     General: Skin is warm and dry.      Coloration: Skin is not pale.      Findings: No rash.   Neurological:      General: No focal deficit present.      Mental Status: She is alert and oriented to person, place, and time.   Psychiatric:         Behavior: Behavior normal.         Vital Signs  ED Triage Vitals [01/26/24 1426]   Temperature Pulse Respirations Blood Pressure SpO2   97.8 °F (36.6 °C) 88 20 124/52 100 %      Temp Source Heart Rate Source Patient Position - Orthostatic VS BP Location FiO2 (%)   Oral Monitor Sitting Left arm --      Pain Score       10 - Worst Possible Pain           Vitals:    01/26/24 1426   BP: 124/52   Pulse: 88   Patient Position - Orthostatic VS: Sitting         Visual Acuity      ED Medications  Medications   acetaminophen (TYLENOL) tablet 975 mg (has no administration in time range)   lidocaine (LIDODERM) 5 % patch 1 patch (has no administration in time range)   sucralfate (CARAFATE) tablet 1 g (has no administration in time range)   aluminum-magnesium hydroxide-simethicone (MAALOX) oral suspension 30 mL (has no administration in time range)   ketorolac (TORADOL) injection 15 mg (15 mg Intravenous Given 1/26/24 1503)   ondansetron (ZOFRAN) injection 4 mg (4 mg Intravenous Given 1/26/24 1505)   sodium chloride 0.9 % bolus 1,000 mL (1,000 mL Intravenous New Bag 1/26/24 1503)   iohexol (OMNIPAQUE) 350 MG/ML injection (SINGLE-DOSE) 100 mL (100 mL Intravenous Given 1/26/24 1559)       Diagnostic Studies  Results Reviewed       Procedure Component Value Units Date/Time    UA w Reflex to Microscopic w Reflex to Culture  [[id number]]  (Normal) Collected: 01/26/24 1600    Lab Status: Final result Specimen: Urine, Clean Catch Updated: 01/26/24 1613     Color, UA Yellow     Clarity, UA Clear     Specific Gravity, UA <=1.005     pH, UA 6.5     Leukocytes, UA Negative     Nitrite, UA Negative     Protein, UA Negative mg/dl      Glucose, UA Negative mg/dl      Ketones, UA Negative mg/dl      Urobilinogen, UA 0.2 E.U./dl      Bilirubin, UA Negative     Occult Blood, UA Negative    HS Troponin I 4hr [[id number]]     Lab Status: No result Specimen: Blood     FLU/RSV/COVID - if FLU/RSV clinically relevant [[id number]]  (Normal) Collected: 01/26/24 1459    Lab Status: Final result Specimen: Nares from Nose Updated: 01/26/24 1547     SARS-CoV-2 Negative     INFLUENZA A PCR Negative     INFLUENZA B PCR Negative     RSV PCR Negative    Narrative:      FOR PEDIATRIC PATIENTS - copy/paste COVID Guidelines URL to browser: [email or web address]    SARS-CoV-2 assay is a Nucleic Acid Amplification assay intended for the  qualitative detection of nucleic acid from SARS-CoV-2 in nasopharyngeal  swabs. Results are for the presumptive identification of SARS-CoV-2 RNA.    Positive results are indicative of infection with SARS-CoV-2, the virus  causing COVID-19, but do not rule out bacterial infection or co-infection  with other viruses. Laboratories within the [place] and its  territories are required to report all positive results to the appropriate  public health authorities. Negative results do not preclude SARS-CoV-2  infection and should not be used as the sole basis for treatment or other  patient management decisions. Negative results must be combined with  clinical observations, patient history, and epidemiological information.  This test has not been FDA cleared or approved.    This test has been authorized by FDA under an Emergency Use Authorization  (EUA). This test is only authorized for  the duration of time the  declaration that circumstances exist justifying the authorization of the  emergency use of an in vitro diagnostic tests for detection of SARS-CoV-2  virus and/or diagnosis of COVID-19 infection under section 564(b)(1) of  the Act, 21 U.S.C. 360bbb-3(b)(1), unless the authorization is terminated  or revoked sooner. The test has been validated but independent review by FDA  and CLIA is pending.    Test performed using [employer] GeneXpert: This RT-PCR assay targets N2,  a region unique to SARS-CoV-2. A conserved region in the E-gene was chosen  for pan-Sarbecovirus detection which includes SARS-CoV-2.    According to CMS-2020-01-R, this platform meets the definition of high-throughput technology.    HS Troponin 0hr (reflex protocol) [[id number]]  (Normal) Collected: 01/26/24 1459    Lab Status: Final result Specimen: Blood from Arm, Left Updated: 01/26/24 1526     hs TnI 0hr 4 ng/L     HS Troponin I 2hr [[id number]]     Lab Status: No result Specimen: Blood     Comprehensive metabolic panel [[id number]]  (Abnormal) Collected: 01/26/24 1459    Lab Status: Final result Specimen: Blood from Arm, Left Updated: 01/26/24 1520     Sodium 132 mmol/L      Potassium 3.6 mmol/L      Chloride 98 mmol/L      CO2 25 mmol/L      ANION GAP 9 mmol/L      BUN 17 mg/dL      Creatinine 1.19 mg/dL      Glucose 106 mg/dL      Calcium 9.2 mg/dL      AST 23 U/L      ALT 24 U/L      Alkaline Phosphatase 62 U/L      Total Protein 7.8 g/dL      Albumin 4.3 g/dL      Total Bilirubin 0.34 mg/dL      eGFR 49 ml/min/1.73sq m     Narrative:      National Kidney Disease Foundation guidelines for Chronic Kidney Disease (CKD):     Stage 1 with normal or high GFR (GFR > 90 mL/min/1.73 square meters)    Stage 2 Mild CKD (GFR = 60-89 mL/min/1.73 square meters)    Stage 3A Moderate CKD (GFR = 45-59 mL/min/1.73 square meters)    Stage 3B Moderate CKD (GFR = 30-44 mL/min/1.73 square meters)    Stage 4 Severe CKD (GFR = 15-29 mL/min/1.73  square meters)    Stage 5 End Stage CKD (GFR <15 mL/min/1.73 square meters)  Note: GFR calculation is accurate only with a steady state creatinine    Lipase [[id number]]  (Normal) Collected: 01/26/24 1459    Lab Status: Final result Specimen: Blood from Arm, Left Updated: 01/26/24 1520     Lipase 27 u/L     CBC and differential [[id number]]  (Abnormal) Collected: 01/26/24 1459    Lab Status: Final result Specimen: Blood from Arm, Left Updated: 01/26/24 1503     WBC 3.60 Thousand/uL      RBC 3.87 Million/uL      Hemoglobin 12.2 g/dL      Hematocrit 36.1 %      MCV 93 fL      MCH 31.5 pg      MCHC 33.8 g/dL      RDW 11.9 %      MPV 9.4 fL      Platelets 193 Thousands/uL      nRBC 0 /100 WBCs      Neutrophils Relative 61 %      Immat GRANS % 0 %      Lymphocytes Relative 15 %      Monocytes Relative 16 %      Eosinophils Relative 8 %      Basophils Relative 0 %      Neutrophils Absolute 2.21 Thousands/µL      Immature Grans Absolute 0.01 Thousand/uL      Lymphocytes Absolute 0.54 Thousands/µL      Monocytes Absolute 0.56 Thousand/µL      Eosinophils Absolute 0.28 Thousand/µL      Basophils Absolute 0.00 Thousands/µL                    CT abdomen pelvis with contrast   Final Result by Josie Silver MD (01/26 1644)      No acute inflammatory process in the abdomen or pelvis.         Workstation performed: [id number]                    Procedures  Procedures         ED Course  ED Course as of 01/26/24 1655   Fri Jan 26, 2024   1505 CBC and differential(!)   1514 Procedure Note: EKG  Date/Time: 01/26/24 2:59 PM   Interpreted by: Raven Villarreal  Indications / Diagnosis: abdominal pain  ECG reviewed by me, the ED Provider: yes   The EKG demonstrates:  Rhythm: rate 82, normal sinus  Intervals: normal intervals  Axis: normal axis  QRS/Blocks: normal QRS  ST Changes: No acute ST Changes, no STD/WESTON.    1526 hs TnI 0hr: 4   1551 FLU/RSV/COVID - if FLU/RSV clinically relevant   1613 UA w Reflex to Microscopic w Reflex to  Culture   1647 Patient re-evaluated. Reports pain improving but still with some residual discomfort.                                             Medical Decision Making  63-year-old female presenting for evaluation of upper abdominal pain.  Vital signs stable on arrival.  Patient with mild upper abdominal tenderness but no peritonitic signs.  Differential diagnoses include but not limited to gastritis, pancreatitis, cholecystitis, cholelithiasis, atypical ACS, obstruction, perforation, colitis, ureterolithiasis, pyelonephritis.  Labs overall unremarkable.  UA not consistent with UTI.  CT abdomen and pelvis unremarkable.  Patient treated symptomatically with improvement in her symptoms.  Otherwise stable for discharge at this time.  Advised follow-up with primary care physician.  Return precautions discussed.    Problems Addressed:  Pain of upper abdomen: acute illness or injury    Amount and/or Complexity of Data Reviewed  Labs: ordered. Decision-making details documented in ED Course.  Radiology: ordered.  ECG/medicine tests: ordered and independent interpretation performed. Decision-making details documented in ED Course.    Risk  OTC drugs.  Prescription drug management.             Disposition  Final diagnoses:   Pain of upper abdomen     Time reflects when diagnosis was documented in both MDM as applicable and the Disposition within this note       Time User Action Codes Description Comment    1/26/2024  4:53 PM Raven Villarreal Add [R10.10] Pain of upper abdomen           ED Disposition       ED Disposition   Discharge    Condition   Stable    Date/Time   Fri Jan 26, 2024  4:53 PM    Comment   Siena Olmedo discharge to home/self care.                   Follow-up Information       Follow up With Specialties Details Why Contact Info    Monty Mora MD Internal Medicine Schedule an appointment as soon as possible for a visit   [address] [place], [place] [id number] [phone]              Patient's  Medications   Discharge Prescriptions    DICYCLOMINE (BENTYL) 20 MG TABLET    Take 1 tablet (20 mg total) by mouth every 6 (six) hours as needed (abdominal pain) for up to 5 days       Start Date: 1/26/2024 End Date: 1/31/2024       Order Dose: 20 mg       Quantity: 20 tablet    Refills: 0    FAMOTIDINE (PEPCID) 20 MG TABLET    Take 1 tablet (20 mg total) by mouth daily for 5 days       Start Date: 1/26/2024 End Date: 1/31/2024       Order Dose: 20 mg       Quantity: 5 tablet    Refills: 0    LIDOCAINE (LIDODERM) 5 %    Apply 1 patch topically over 12 hours daily for 5 days Remove & Discard patch within 12 hours or as directed by MD       Start Date: 1/26/2024 End Date: 1/31/2024       Order Dose: 1 patch       Quantity: 5 patch    Refills: 0    SUCRALFATE (CARAFATE) 1 G TABLET    Take 1 tablet (1 g total) by mouth 4 (four) times a day for 7 days       Start Date: 1/26/2024 End Date: 2/2/2024       Order Dose: 1 g       Quantity: 28 tablet    Refills: 0       No discharge procedures on file.    PDMP Review       None            ED Provider  Electronically Signed by             Raven Villarreal MD  01/26/24 6534

## 2024-01-26 NOTE — DISCHARGE INSTRUCTIONS
Follow-up with your primary care physician.  Take the prescribed medications as directed.  You can also take Tylenol every 6 hours as needed for pain.  Please return to the emergency department if you develop worsening symptoms, severe pain, vomiting, persistent fever, or anything else concerning to you.

## 2024-01-26 NOTE — ED NOTES
Pt aware of need for urine sample., unable to provide one at this time. Significant other at bedside, pt denies having any needs. Call roe in reach.        Isa Gamboa RN  01/26/24 1181

## 2024-01-28 LAB
ATRIAL RATE: 82 BPM
P AXIS: 70 DEGREES
PR INTERVAL: 130 MS
QRS AXIS: 14 DEGREES
QRSD INTERVAL: 76 MS
QT INTERVAL: 366 MS
QTC INTERVAL: 427 MS
T WAVE AXIS: 75 DEGREES
VENTRICULAR RATE: 82 BPM

## 2024-02-07 ENCOUNTER — OFFICE VISIT (OUTPATIENT)
Dept: DERMATOLOGY | Facility: CLINIC | Age: 64
End: 2024-02-07
Payer: COMMERCIAL

## 2024-02-07 VITALS — HEIGHT: 62 IN | TEMPERATURE: 96.8 F | BODY MASS INDEX: 25.03 KG/M2 | WEIGHT: 136 LBS

## 2024-02-07 DIAGNOSIS — D22.9 MULTIPLE BENIGN MELANOCYTIC NEVI: Primary | ICD-10-CM

## 2024-02-07 DIAGNOSIS — L91.8 SKIN TAG: ICD-10-CM

## 2024-02-07 DIAGNOSIS — L82.1 SEBORRHEIC KERATOSES: ICD-10-CM

## 2024-02-07 DIAGNOSIS — D48.9 NEOPLASM OF UNCERTAIN BEHAVIOR: ICD-10-CM

## 2024-02-07 DIAGNOSIS — D18.01 CHERRY ANGIOMA: ICD-10-CM

## 2024-02-07 DIAGNOSIS — L81.4 SOLAR LENTIGO: ICD-10-CM

## 2024-02-07 PROCEDURE — 88305 TISSUE EXAM BY PATHOLOGIST: CPT | Performed by: STUDENT IN AN ORGANIZED HEALTH CARE EDUCATION/TRAINING PROGRAM

## 2024-02-07 PROCEDURE — 88342 IMHCHEM/IMCYTCHM 1ST ANTB: CPT | Performed by: STUDENT IN AN ORGANIZED HEALTH CARE EDUCATION/TRAINING PROGRAM

## 2024-02-07 PROCEDURE — 11102 TANGNTL BX SKIN SINGLE LES: CPT | Performed by: STUDENT IN AN ORGANIZED HEALTH CARE EDUCATION/TRAINING PROGRAM

## 2024-02-07 PROCEDURE — 99204 OFFICE O/P NEW MOD 45 MIN: CPT | Performed by: STUDENT IN AN ORGANIZED HEALTH CARE EDUCATION/TRAINING PROGRAM

## 2024-02-07 RX ORDER — ATORVASTATIN CALCIUM 40 MG/1
40 TABLET, FILM COATED ORAL DAILY
COMMUNITY

## 2024-02-07 NOTE — PROGRESS NOTES
[facility]     Patient Name: Siena Olmedo  Encounter Date: 2/7/2024     Have you been cared for by a [facility] Dermatologist in the last 3 years and, if so, which description applies to you?    NO.   I am considered a \"new\" patient and must complete all patient intake questions. I am FEMALE/of child-bearing potential.    REVIEW OF SYSTEMS:  Have you recently had or currently have any of the following? Recent fever or chills? YES, last week  Any non-healing wound? No  Are you pregnant or planning to become pregnant? No  Are you currently or planning to be nursing or breast feeding? No   PAST MEDICAL HISTORY:  Have you personally ever had or currently have any of the following?  If \"YES,\" then please provide more detail. Skin cancer (such as Melanoma, Basal Cell Carcinoma, Squamous Cell Carcinoma?  No  Tuberculosis, HIV/AIDS, Hepatitis B or C: No  Radiation Treatment No   HISTORY OF IMMUNOSUPPRESSION:   Do you have a history of any of the following:  Systemic Immunosuppression such as Diabetes, Biologic or Immunotherapy, Chemotherapy, Organ Transplantation, Bone Marrow Transplantation?  No    Answering \"YES\" requires the addition of the dotphrase \"IMMUNOSUPPRESSED\" as the first diagnosis of the patient's visit.   FAMILY HISTORY:  Any \"first degree relatives\" (parent, brother, sister, or child) with the following?    Skin Cancer, Pancreatic or Other Cancer? YES, sister had skin cancer (unsure of type)   PATIENT EXPERIENCE:    Do you want the Dermatologist to perform a COMPLETE skin exam today including a clinical examination under the \"bra and underwear\" areas?  Yes, except in groin  If necessary, do we have your permission to call and leave a detailed message on your Preferred Phone number that includes your specific medical information?  Yes      No Known Allergies   Current Outpatient Medications:     Ascorbic Acid (vitamin C) 1000 MG tablet, Take 1,000 mg by mouth daily, Disp: , " Rfl:     cholecalciferol (VITAMIN D3) 1,000 units tablet, Take 1,000 Units by mouth daily, Disp: , Rfl:     dicyclomine (BENTYL) 20 mg tablet, Take 1 tablet (20 mg total) by mouth every 6 (six) hours as needed (abdominal pain) for up to 5 days, Disp: 20 tablet, Rfl: 0    famotidine (PEPCID) 20 mg tablet, Take 1 tablet (20 mg total) by mouth daily for 5 days, Disp: 5 tablet, Rfl: 0    lidocaine (Lidoderm) 5 %, Apply 1 patch topically over 12 hours daily for 5 days Remove & Discard patch within 12 hours or as directed by MD, Disp: 5 patch, Rfl: 0    lisinopril (ZESTRIL) 10 mg tablet, Take 10 mg by mouth daily, Disp: , Rfl:     meclizine (ANTIVERT) 25 mg tablet, Take 25 mg by mouth 3 (three) times a day as needed, Disp: , Rfl:     Multiple Vitamins-Minerals (multivitamin with minerals) tablet, Take 1 tablet by mouth daily, Disp: , Rfl:     omeprazole (PriLOSEC) 20 mg delayed release capsule, Take 20 mg by mouth daily, Disp: , Rfl:     sucralfate (CARAFATE) 1 g tablet, Take 1 tablet (1 g total) by mouth 4 (four) times a day for 7 days, Disp: 28 tablet, Rfl: 0    vitamin E 100 UNIT capsule, Take 100 Units by mouth daily, Disp: , Rfl:           Whom besides the patient is providing clinical information about today's encounter?   Other:  spouse present    Physical Exam and Assessment/Plan by Diagnosis:      NEOPLASM OF UNCERTAIN BEHAVIOR    Physical Exam:  (Anatomic Location); (Size and Morphological Description); (Differential Diagnosis):  Left upper thigh, 1.5 cm x 1.5 cm brown verrucous papule, ddx: seb k vs condyloma    Pertinent Positives:  Pertinent Negatives:    Additional History of Present Condition:  Left inner thigh - patient reports mole has been present forever. Started getting crusty and changing, but unsure of when. Patient reports it looks whiter. Hasn't hurt, hasn't bled.    Plan:  Biopsy today - will call with results  Discussed appropriate care for biopsy site        PROCEDURES PERFORMED TODAY ASSOCIATED  "WITH THIS CONDITION:          TANGENTIAL BIOPSY  PROCEDURE TANGENTIAL (SHAVE) BIOPSY NOTE:    Performing Physician:   Anatomic Location; Clinical Description with size (cm); Pre-Op Diagnosis:   Left upper thigh, 1.5 cm x 1.5 cm brown verrucous papule, ddx: seb k vs condyloma  Post-op diagnosis: Same     Local anesthesia: 3:1 1% xylocaine with epi and 1-100,000 buffered     Topical anesthesia: None    Hemostasis: Aluminum chloride       After obtaining informed consent  at which time there was a discussion about the purpose of biopsy  and low risks of infection and bleeding.  The area was prepped and draped in the usual fashion. Anesthesia was obtained with 1% lidocaine with epinephrine. A shave biopsy to an appropriate sampling depth was obtained by Shave (Dermablade or 15 blade) The resulting wound was covered with surgical ointment and bandaged appropriately.     The patient tolerated the procedure well without complications and was without signs of functional compromise.      Specimen has been sent for review by Dermatopathology.    Standard post-procedure care has been explained and has been included in written form within the patient's copy of Informed Consent.    INFORMED CONSENT DISCUSSION AND POST-OPERATIVE INSTRUCTIONS FOR PATIENT    I.  RATIONALE FOR PROCEDURE  I understand that a skin biopsy allows the Dermatologist to test a lesion or rash under the microscope to obtain a diagnosis.  It usually involves numbing the area with numbing medication and removing a small piece of skin; sometimes the area will be closed with sutures. In this specific procedure, sutures are not usually needed.  If any sutures are placed, then they are usually need to be removed in 2 weeks or less.    I understand that my Dermatologist recommends that a skin \"shave\" biopsy be performed today.  A local anesthetic, similar to the kind that a dentist uses when filling a cavity, will be injected with a very small " "needle into the skin area to be sampled.  The injected skin and tissue underneath \"will go to sleep” and become numb so no pain should be felt afterwards.  An instrument shaped like a tiny \"razor blade\" (shave biopsy instrument) will be used to cut a small piece of tissue and skin from the area so that a sample of tissue can be taken and examined more closely under the microscope.  A slight amount of bleeding will occur, but it will be stopped with direct pressure and a pressure bandage and any other appropriate methods.  I understands that a scar will form where the wound was created.  Surgical ointment will be applied to help protect the wound.  Sutures are not usually needed.    II.  RISKS AND POTENTIAL COMPLICATIONS   I understand the risks and potential complications of a skin biopsy include but are not limited to the following:  Bleeding  Infection  Pain  Scar/keloid  Skin discoloration  Incomplete Removal  Recurrence  Nerve Damage/Numbness/Loss of Function  Allergic Reaction to Anesthesia  Biopsies are diagnostic procedures and based on findings additional treatment or evaluation may be required  Loss or destruction of specimen resulting in no additional findings    My Dermatologist has explained to me the nature of the condition, the nature of the procedure, and the benefits to be reasonably expected compared with alternative approaches.  My Dermatologist has discussed the likelihood of major risks or complications of this procedure including the specific risks listed above, such as bleeding, infection, and scarring/keloid.  I understand that a scar is expected after this procedure.  I understand that my physician cannot predict if the scar will form a \"keloid,\" which extends beyond the borders of the wound that is created.  A keloid is a thick, painful, and bumpy scar.  A keloid can be difficult to treat, as it does not always respond well to therapy, which includes injecting cortisone directly into the " "keloid every few weeks.  While this usually reduces the pain and size of the scar, it does not eliminate it.      I understand that photographs may be taken before and after the procedure.  These will be maintained as part of the medical providers confidential records and may not be made available to me.  I further authorize the medical provider to use the photographs for teaching purposes or to illustrate scientific papers, books, or lectures if in his/her judgment, medical research, education, or science may benefit from its use.    I have had an opportunity to fully inquire about the risks and benefits of this procedure and its alternatives.   I have been given ample time and opportunity to ask questions and to seek a second opinion if I wished to do so.  I acknowledge that there have specifically been no guarantees as to the cosmetic results from the procedure.  I am aware that with any procedure there is always the possibility of an unexpected complication.    III. POST-PROCEDURAL CARE (WHAT YOU WILL NEED TO DO \"AFTER THE BIOPSY\" TO OPTIMIZE HEALING)    Keep the area clean and dry.  Try NOT to remove the bandage or get it wet for the first 24 hours.    Gently clean the area and apply surgical ointment (such as Vaseline petrolatum ointment, which is available \"over the counter\" and not a prescription) to the biopsy site for up to 2 weeks straight.  This acts to protect the wound from the outside world.      Sutures are not usually placed in this procedure.  If any sutures were placed, return for suture removal as instructed (generally 1 week for the face, 2 weeks for the body).      Take Acetaminophen (Tylenol) for discomfort, if no contraindications.  Ibuprofen or aspirin could make bleeding worse.    Call our office immediately for signs of infection: fever, chills, increased redness, warmth, tenderness, discomfort/pain, or pus or foul smell coming from the wound.    WHAT TO DO IF THERE IS ANY BLEEDING?  If a " "small amount of bleeding is noticed, place a clean cloth over the area and apply firm pressure for ten minutes.  Check the wound after 10 minutes of direct pressure.  If bleeding persists, try one more time for an additional 10 minutes of direct pressure on the area.  If the bleeding becomes heavier or does not stop after the second attempt, or if you have any other questions about this procedure, then please call your [facility]'s Dermatologist by calling [phone] (SKIN).     I hereby acknowledge that I have reviewed and verified the site with my Dermatologist and have requested and authorized my Dermatologist to proceed with the procedure.         Medical Complexity:    UNDIAGNOSED NEW PROBLEM WITH UNCERTAIN DIAGNOSIS.  A condition included in the differential diagnosis represents a high risk of morbidity without treatment.       VENEGAS ANGIOMAS     Physical Exam:  Anatomic Location Affected:  Trunk and extremities  Morphological Description:  Scattered cherry red papules  Denies pain, itch, bleeding. No treatments tried. Present for years. Present constantly; no modifying factors which make it worse or better.     Assessment and Plan:  Based on a thorough discussion of this condition and the management approach to it (including a comprehensive discussion of the known risks, side effects and potential benefits of treatment), the patient (family) agrees to implement the following specific plan:  Reassure benign        SEBORRHEIC KERATOSIS; NON-INFLAMED     Physical Exam:  Anatomic Location Affected:  Trunk and extremities  Morphological Description:  Waxy, smooth to warty textured, yellow to brownish-grey to dark brown to blackish, discrete, \"stuck-on\" appearing papules.  Present for years. Denies pain, itch, bleeding.      Additional History of Present Condition:  Present constantly; no modifying factors which make it worse or better. No prior treatment.       Assessment and Plan:  Based on a thorough discussion " "of this condition and the management approach to it (including a comprehensive discussion of the known risks, side effects and potential benefits of treatment), the patient (family) agrees to implement the following specific plan:  Reassure benign  Use sun protection.  Apply SPF 30 or higher at least three times a day.  Wear sun protecting clothing and hats.          SOLAR LENTIGINES   OTHER SKIN CHANGES DUE TO CHRONIC EXPOSURE TO NONIONIZING RADIATION     Physical Exam:  Anatomic Location Affected:  Sun exposed areas of back, chest, arms, legs  Morphological Description:  Multiple scattered brown to tan evenly pigmented macules   Denies pain, itch, bleeding. No treatments tried. Present for months - years. Reports getting newer lesions with sun exposure.         Assessment and Plan:  Based on a thorough discussion of this condition and the management approach to it (including a comprehensive discussion of the known risks, side effects and potential benefits of treatment), the patient (family) agrees to implement the following specific plan:  Reassure benign  Use sun protection.  Apply SPF 30 or higher at least three times a day.  Wear sun protecting clothing and hats.         MULTIPLE MELANOCYTIC NEVI (\"Moles\")     Physical Exam:  Anatomic Location Affected: Trunk and extremities  Morphological Description:  Scattered, round to ovoid, symmetrical-appearing, even bordered, skin colored to dark brown macules/papules  Denies pain, itch, bleeding. No treatments tried. Present for years. Present constantly; no modifying factors which make it worse or better. Denies actively changing or growing moles.      Assessment and Plan:  Based on a thorough discussion of this condition and the management approach to it (including a comprehensive discussion of the known risks, side effects and potential benefits of treatment), the patient (family) agrees to implement the following specific plan:  Reassure benign  Monitor for " changes  Use sun protection.  Apply SPF 30 or higher at least three times a day.  Wear sun protecting clothing and hats.       Worrisome signs of skin malignancy discussed, questions answered. Regular self-skin check discussed. Advised to call or return to office if patient notices any spots of concern, rapidly growing/changing lesions, bleeding lesions, non-healing lesions. Advised regular SPF use.       ACROCHORDON (SKIN TAG)    Physical Exam:  Anatomic Location: Axilla and inframammary  Morphologic Description:  Skin-colored to slightly hyperpigmented, pedunculated papules  Pertinent Positives:  Bleeding? No  Pertinent Negatives:    Additional History of Present Condition:  Present on exam.    Plan:  We discussed the benign nature of the condition. Skin tags (acrochordons) form when the body produces extra cells in the skin's top layers. They tend to form in skin folds and where the skin rubs against itself.     PROCEDURES PERFORMED TODAY ASSOCIATED WITH THIS CONDITION:          NONE     Medical Complexity:    CHRONIC ILLNESS (expected duration of >1 year):  EXACERBATION, PROGRESSION, OR SIDE EFFECTS OF TREATMENT.  Acutely worsening, poorly controlled, or progressing.  Intent is to control progression and requires additional supportive care or attention to treatment for side effects but not at level of consideration of hospital level of care.         Scribe Attestation      I,:  Apurva Baez am acting as a scribe while in the presence of the attending physician.:       I,:  Jacobo Del Valle MD personally performed the services described in this documentation    as scribed in my presence.:           Patient was seen and discussed with Dr. Ivon Diaz PA-C    This encounter (89730 or 89803) has a MODERATE level of medical decision making (MDM) given the presence of at least 2 of the elements of MDM (below):  *MODERATE number and complexity of problems addressed: 1 or more chronic illnesses with  exacerbation, progression, or side effects of treatment; OR 2 or more stable chronic illnesses; OR 1 undiagnosed new problem with uncertain prognosis; OR 1 acute illness with systemic symptoms; OR 1 acute uncomplicated injury  *MODERATE amount and/or complexity of data reviewed: this includes review of previous notes and/or lab tests, independent interpretation of tests performed by another healthcare professional, ordering tests, assessment requiring independent historian, or discussion with other healthcare professionals.  *MODERATE risk of complications and/or morbidity or mortality of patient management (for example, prescription drug management, decisions regarding minor surgery with identified patient or procedure risk factors).

## 2024-02-07 NOTE — PATIENT INSTRUCTIONS
"III. POST-PROCEDURAL CARE (WHAT YOU WILL NEED TO DO \"AFTER THE BIOPSY\" TO OPTIMIZE HEALING)    Keep the area clean and dry.  Try NOT to remove the bandage or get it wet for the first 24 hours.    Gently clean the area and apply surgical ointment (such as Vaseline petrolatum ointment, which is available \"over the counter\" and not a prescription) to the biopsy site for up to 2 weeks straight.  This acts to protect the wound from the outside world.      Sutures are not usually placed in this procedure.  If any sutures were placed, return for suture removal as instructed (generally 1 week for the face, 2 weeks for the body).      Take Acetaminophen (Tylenol) for discomfort, if no contraindications.  Ibuprofen or aspirin could make bleeding worse.    Call our office immediately for signs of infection: fever, chills, increased redness, warmth, tenderness, discomfort/pain, or pus or foul smell coming from the wound.    WHAT TO DO IF THERE IS ANY BLEEDING?  If a small amount of bleeding is noticed, place a clean cloth over the area and apply firm pressure for ten minutes.  Check the wound after 10 minutes of direct pressure.  If bleeding persists, try one more time for an additional 10 minutes of direct pressure on the area.  If the bleeding becomes heavier or does not stop after the second attempt, or if you have any other questions about this procedure, then please call your [facility]'s Dermatologist by calling [phone] (SKIN).   "

## 2024-02-12 PROCEDURE — 88342 IMHCHEM/IMCYTCHM 1ST ANTB: CPT | Performed by: STUDENT IN AN ORGANIZED HEALTH CARE EDUCATION/TRAINING PROGRAM

## 2024-02-12 PROCEDURE — 88305 TISSUE EXAM BY PATHOLOGIST: CPT | Performed by: STUDENT IN AN ORGANIZED HEALTH CARE EDUCATION/TRAINING PROGRAM

## 2024-02-12 NOTE — RESULT ENCOUNTER NOTE
DERMATOPATHOLOGY RESULT NOTE    Results reviewed by ordering physician.  Called patient to personally discuss results. No answer, left voicemail with result.      Instructions for Clinical Derm Team:   (remember to route Result Note to appropriate staff):    None    Result & Plan by Specimen:    Specimen A: benign  Plan: reassured, benign. Cryotherapy if recurs      Tissue Exam: [id number]  Order: [id number]  Status: Final result      Visible to patient: Yes (not seen)      Dx: Neoplasm of uncertain behavior    0 Result Notes     Component   Case Report  Surgical Pathology Report                         Case: [id number]                                  Authorizing Provider:  Anamika Diaz PA-C        Collected:           02/07/2024 0929              Ordering Location:     [facility]      Received:            02/07/2024 0929                                     [place]                                                                      Pathologist:           Jacobo Del Valle MD                                                          Specimen:    Skin, Other, A: left upper thigh                                                        Final Diagnosis  A. Skin, left upper thigh, shave biopsy:    Consistent with VERRUCOUS KERATOSIS, irritated.    Electronically signed by Jacobo Del Valle MD on 2/12/2024 at  2:38 PM  Additional Information   All reported additional testing was performed with appropriately reactive controls.  These tests were developed and their performance characteristics determined by [facility] or appropriate performing facility, though some tests may be performed on tissues which have not been validated for performance characteristics (such as staining performed on alcohol exposed cell blocks and decalcified tissues).  Results should be interpreted with caution and in the context of the patients' clinical condition. These tests may not be cleared or approved by the [place]  "Food and Drug Administration, though the FDA has determined that such clearance or approval is not necessary. These tests are used for clinical purposes and they should not be regarded as investigational or for research. This laboratory has been approved by CLIA 88, designated as a high-complexity laboratory and is qualified to perform these tests.  .  Gross Description   A. The specimen is received in formalin, labeled with the patient's name and hospital number, and is designated \" left upper thigh.\"  It consists of a 1.3 x 1.2 cm fragment of skin, up to 0.3 cm thick.  The skin is granular to nodular, tan-white to tan-gray.  The margin is inked green and the skin surface is inked red.  The specimen is sectioned and submitted entirely in 1 cassette, between sponges.    Note: The estimated total formalin fixation time based upon information provided by the submitting clinician and the standard processing schedule is under 72 hours.    [facility]  Clinical Information   Specimen A: Left upper thigh, skin, shave biopsy, 64 year old female with a 1.5 cm x 1.5 cm brown verrucous papule, ddx: seb k vs condyloma    Attn: Derm Path  Resulting Agency [id number]          Specimen Collected: 02/07/24  9:29 AM Last Resulted: 02/12/24  2:38 PM     Order Details       View Encounter       Lab and Collection Details       Routing       Result History    View All Conversations on this Encounter        Scans on Order [id number]    Lab Result Document - Document on 2/12/2024  2:38 PM        "

## 2024-06-01 ENCOUNTER — HOSPITAL ENCOUNTER (INPATIENT)
Facility: HOSPITAL | Age: 64
LOS: 2 days | Discharge: HOME | DRG: 322 | End: 2024-06-04
Attending: EMERGENCY MEDICINE | Admitting: INTERNAL MEDICINE
Payer: COMMERCIAL

## 2024-06-01 ENCOUNTER — APPOINTMENT (EMERGENCY)
Dept: RADIOLOGY | Facility: HOSPITAL | Age: 64
DRG: 322 | End: 2024-06-01
Payer: COMMERCIAL

## 2024-06-01 ENCOUNTER — APPOINTMENT (OUTPATIENT)
Dept: RADIOLOGY | Facility: HOSPITAL | Age: 64
DRG: 322 | End: 2024-06-01
Payer: COMMERCIAL

## 2024-06-01 DIAGNOSIS — I24.9 ACS (ACUTE CORONARY SYNDROME) (HCC): ICD-10-CM

## 2024-06-01 DIAGNOSIS — R42 VERTIGO: ICD-10-CM

## 2024-06-01 DIAGNOSIS — R07.9 CHEST PAIN, UNSPECIFIED TYPE: Primary | ICD-10-CM

## 2024-06-01 PROBLEM — K76.0 NONALCOHOLIC FATTY LIVER DISEASE: Status: ACTIVE | Noted: 2020-02-10

## 2024-06-01 PROBLEM — R53.1 GENERALIZED WEAKNESS: Status: ACTIVE | Noted: 2024-06-01

## 2024-06-01 PROBLEM — I10 BENIGN HYPERTENSION: Status: ACTIVE | Noted: 2018-01-08

## 2024-06-01 PROBLEM — E03.9 HYPOTHYROIDISM: Status: ACTIVE | Noted: 2022-06-12

## 2024-06-01 PROBLEM — R07.89 ATYPICAL CHEST PAIN: Status: ACTIVE | Noted: 2024-06-01

## 2024-06-01 LAB
2HR DELTA HS TROPONIN: 45 NG/L
4HR DELTA HS TROPONIN: 441 NG/L
ALBUMIN SERPL BCP-MCNC: 4.7 G/DL (ref 3.5–5)
ALP SERPL-CCNC: 53 U/L (ref 34–104)
ALT SERPL W P-5'-P-CCNC: 21 U/L (ref 7–52)
ANION GAP SERPL CALCULATED.3IONS-SCNC: 10 MMOL/L (ref 4–13)
APTT PPP: 27 SECONDS (ref 23–37)
AST SERPL W P-5'-P-CCNC: 22 U/L (ref 13–39)
BASOPHILS # BLD AUTO: 0.05 THOUSANDS/ÂΜL (ref 0–0.1)
BASOPHILS NFR BLD AUTO: 1 % (ref 0–1)
BILIRUB SERPL-MCNC: 0.68 MG/DL (ref 0.2–1)
BNP SERPL-MCNC: 72 PG/ML (ref 0–100)
BUN SERPL-MCNC: 14 MG/DL (ref 5–25)
CALCIUM SERPL-MCNC: 9.9 MG/DL (ref 8.4–10.2)
CARDIAC TROPONIN I PNL SERPL HS: 3 NG/L
CARDIAC TROPONIN I PNL SERPL HS: 444 NG/L
CARDIAC TROPONIN I PNL SERPL HS: 48 NG/L
CARDIAC TROPONIN I PNL SERPL HS: 726 NG/L
CHLORIDE SERPL-SCNC: 101 MMOL/L (ref 96–108)
CO2 SERPL-SCNC: 24 MMOL/L (ref 21–32)
CREAT SERPL-MCNC: 0.74 MG/DL (ref 0.6–1.3)
EOSINOPHIL # BLD AUTO: 0.1 THOUSAND/ÂΜL (ref 0–0.61)
EOSINOPHIL NFR BLD AUTO: 1 % (ref 0–6)
ERYTHROCYTE [DISTWIDTH] IN BLOOD BY AUTOMATED COUNT: 12 % (ref 11.6–15.1)
ERYTHROCYTE [DISTWIDTH] IN BLOOD BY AUTOMATED COUNT: 12 % (ref 11.6–15.1)
GFR SERPL CREATININE-BSD FRML MDRD: 86 ML/MIN/1.73SQ M
GLUCOSE SERPL-MCNC: 106 MG/DL (ref 65–140)
GLUCOSE SERPL-MCNC: 112 MG/DL (ref 65–140)
HCT VFR BLD AUTO: 34.2 % (ref 34.8–46.1)
HCT VFR BLD AUTO: 39.3 % (ref 34.8–46.1)
HGB BLD-MCNC: 11.9 G/DL (ref 11.5–15.4)
HGB BLD-MCNC: 13.3 G/DL (ref 11.5–15.4)
IMM GRANULOCYTES # BLD AUTO: 0.04 THOUSAND/UL (ref 0–0.2)
IMM GRANULOCYTES NFR BLD AUTO: 1 % (ref 0–2)
INR PPP: 1.06 (ref 0.84–1.19)
LYMPHOCYTES # BLD AUTO: 1.85 THOUSANDS/ÂΜL (ref 0.6–4.47)
LYMPHOCYTES NFR BLD AUTO: 23 % (ref 14–44)
MCH RBC QN AUTO: 30.9 PG (ref 26.8–34.3)
MCH RBC QN AUTO: 31 PG (ref 26.8–34.3)
MCHC RBC AUTO-ENTMCNC: 33.8 G/DL (ref 31.4–37.4)
MCHC RBC AUTO-ENTMCNC: 34.8 G/DL (ref 31.4–37.4)
MCV RBC AUTO: 89 FL (ref 82–98)
MCV RBC AUTO: 91 FL (ref 82–98)
MONOCYTES # BLD AUTO: 0.76 THOUSAND/ÂΜL (ref 0.17–1.22)
MONOCYTES NFR BLD AUTO: 9 % (ref 4–12)
NEUTROPHILS # BLD AUTO: 5.35 THOUSANDS/ÂΜL (ref 1.85–7.62)
NEUTS SEG NFR BLD AUTO: 65 % (ref 43–75)
NRBC BLD AUTO-RTO: 0 /100 WBCS
PLATELET # BLD AUTO: 246 THOUSANDS/UL (ref 149–390)
PLATELET # BLD AUTO: 264 THOUSANDS/UL (ref 149–390)
PMV BLD AUTO: 9.6 FL (ref 8.9–12.7)
PMV BLD AUTO: 9.9 FL (ref 8.9–12.7)
POTASSIUM SERPL-SCNC: 3.8 MMOL/L (ref 3.5–5.3)
PROT SERPL-MCNC: 8.1 G/DL (ref 6.4–8.4)
PROTHROMBIN TIME: 14.5 SECONDS (ref 11.6–14.5)
RBC # BLD AUTO: 3.84 MILLION/UL (ref 3.81–5.12)
RBC # BLD AUTO: 4.31 MILLION/UL (ref 3.81–5.12)
SODIUM SERPL-SCNC: 135 MMOL/L (ref 135–147)
WBC # BLD AUTO: 10.56 THOUSAND/UL (ref 4.31–10.16)
WBC # BLD AUTO: 8.15 THOUSAND/UL (ref 4.31–10.16)

## 2024-06-01 PROCEDURE — C9113 INJ PANTOPRAZOLE SODIUM, VIA: HCPCS

## 2024-06-01 PROCEDURE — 80053 COMPREHEN METABOLIC PANEL: CPT | Performed by: EMERGENCY MEDICINE

## 2024-06-01 PROCEDURE — 71045 X-RAY EXAM CHEST 1 VIEW: CPT

## 2024-06-01 PROCEDURE — 85730 THROMBOPLASTIN TIME PARTIAL: CPT | Performed by: INTERNAL MEDICINE

## 2024-06-01 PROCEDURE — 99284 EMERGENCY DEPT VISIT MOD MDM: CPT

## 2024-06-01 PROCEDURE — 83735 ASSAY OF MAGNESIUM: CPT

## 2024-06-01 PROCEDURE — 85025 COMPLETE CBC W/AUTO DIFF WBC: CPT | Performed by: EMERGENCY MEDICINE

## 2024-06-01 PROCEDURE — 93005 ELECTROCARDIOGRAM TRACING: CPT

## 2024-06-01 PROCEDURE — 99222 1ST HOSP IP/OBS MODERATE 55: CPT | Performed by: INTERNAL MEDICINE

## 2024-06-01 PROCEDURE — 96374 THER/PROPH/DIAG INJ IV PUSH: CPT

## 2024-06-01 PROCEDURE — 82948 REAGENT STRIP/BLOOD GLUCOSE: CPT

## 2024-06-01 PROCEDURE — 83690 ASSAY OF LIPASE: CPT

## 2024-06-01 PROCEDURE — 36415 COLL VENOUS BLD VENIPUNCTURE: CPT | Performed by: EMERGENCY MEDICINE

## 2024-06-01 PROCEDURE — 85027 COMPLETE CBC AUTOMATED: CPT | Performed by: INTERNAL MEDICINE

## 2024-06-01 PROCEDURE — 84484 ASSAY OF TROPONIN QUANT: CPT

## 2024-06-01 PROCEDURE — 84100 ASSAY OF PHOSPHORUS: CPT

## 2024-06-01 PROCEDURE — 84484 ASSAY OF TROPONIN QUANT: CPT | Performed by: EMERGENCY MEDICINE

## 2024-06-01 PROCEDURE — 85610 PROTHROMBIN TIME: CPT | Performed by: INTERNAL MEDICINE

## 2024-06-01 PROCEDURE — 83880 ASSAY OF NATRIURETIC PEPTIDE: CPT | Performed by: EMERGENCY MEDICINE

## 2024-06-01 PROCEDURE — 99285 EMERGENCY DEPT VISIT HI MDM: CPT | Performed by: EMERGENCY MEDICINE

## 2024-06-01 RX ORDER — CLOPIDOGREL BISULFATE 75 MG/1
600 TABLET ORAL ONCE
Status: COMPLETED | OUTPATIENT
Start: 2024-06-01 | End: 2024-06-01

## 2024-06-01 RX ORDER — MECLIZINE HYDROCHLORIDE 25 MG/1
25 TABLET ORAL EVERY 8 HOURS
Status: DISCONTINUED | OUTPATIENT
Start: 2024-06-01 | End: 2024-06-04 | Stop reason: HOSPADM

## 2024-06-01 RX ORDER — NITROGLYCERIN 0.4 MG/1
0.4 TABLET SUBLINGUAL ONCE
Status: COMPLETED | OUTPATIENT
Start: 2024-06-01 | End: 2024-06-01

## 2024-06-01 RX ORDER — ONDANSETRON 2 MG/ML
4 INJECTION INTRAMUSCULAR; INTRAVENOUS ONCE
Status: COMPLETED | OUTPATIENT
Start: 2024-06-01 | End: 2024-06-01

## 2024-06-01 RX ORDER — HEPARIN SODIUM 1000 [USP'U]/ML
3900 INJECTION, SOLUTION INTRAVENOUS; SUBCUTANEOUS ONCE
Status: COMPLETED | OUTPATIENT
Start: 2024-06-01 | End: 2024-06-01

## 2024-06-01 RX ORDER — MECLIZINE HCL 12.5 MG/1
25 TABLET ORAL ONCE
Status: COMPLETED | OUTPATIENT
Start: 2024-06-01 | End: 2024-06-01

## 2024-06-01 RX ORDER — SUCRALFATE 1 G/1
1 TABLET ORAL
Status: DISCONTINUED | OUTPATIENT
Start: 2024-06-01 | End: 2024-06-04 | Stop reason: HOSPADM

## 2024-06-01 RX ORDER — SODIUM CHLORIDE, SODIUM GLUCONATE, SODIUM ACETATE, POTASSIUM CHLORIDE, MAGNESIUM CHLORIDE, SODIUM PHOSPHATE, DIBASIC, AND POTASSIUM PHOSPHATE .53; .5; .37; .037; .03; .012; .00082 G/100ML; G/100ML; G/100ML; G/100ML; G/100ML; G/100ML; G/100ML
100 INJECTION, SOLUTION INTRAVENOUS CONTINUOUS
Status: DISCONTINUED | OUTPATIENT
Start: 2024-06-01 | End: 2024-06-02

## 2024-06-01 RX ORDER — LEVOTHYROXINE SODIUM 0.05 MG/1
50 TABLET ORAL
Status: DISCONTINUED | OUTPATIENT
Start: 2024-06-02 | End: 2024-06-04 | Stop reason: HOSPADM

## 2024-06-01 RX ORDER — PANTOPRAZOLE SODIUM 40 MG/10ML
40 INJECTION, POWDER, LYOPHILIZED, FOR SOLUTION INTRAVENOUS EVERY 12 HOURS SCHEDULED
Status: DISCONTINUED | OUTPATIENT
Start: 2024-06-01 | End: 2024-06-04

## 2024-06-01 RX ORDER — ATORVASTATIN CALCIUM 40 MG/1
80 TABLET, FILM COATED ORAL DAILY
Status: DISCONTINUED | OUTPATIENT
Start: 2024-06-02 | End: 2024-06-04 | Stop reason: HOSPADM

## 2024-06-01 RX ORDER — ATORVASTATIN CALCIUM 40 MG/1
40 TABLET, FILM COATED ORAL DAILY
Status: DISCONTINUED | OUTPATIENT
Start: 2024-06-02 | End: 2024-06-01

## 2024-06-01 RX ORDER — HEPARIN SODIUM 1000 [USP'U]/ML
1950 INJECTION, SOLUTION INTRAVENOUS; SUBCUTANEOUS EVERY 6 HOURS PRN
Status: DISCONTINUED | OUTPATIENT
Start: 2024-06-01 | End: 2024-06-04

## 2024-06-01 RX ORDER — LOPERAMIDE HYDROCHLORIDE 2 MG/1
2 CAPSULE ORAL 3 TIMES DAILY PRN
Status: DISCONTINUED | OUTPATIENT
Start: 2024-06-01 | End: 2024-06-04 | Stop reason: HOSPADM

## 2024-06-01 RX ORDER — HEPARIN SODIUM 10000 [USP'U]/100ML
3-20 INJECTION, SOLUTION INTRAVENOUS
Status: DISCONTINUED | OUTPATIENT
Start: 2024-06-01 | End: 2024-06-04

## 2024-06-01 RX ORDER — MECLIZINE HCL 12.5 MG/1
25 TABLET ORAL EVERY 8 HOURS PRN
Status: DISCONTINUED | OUTPATIENT
Start: 2024-06-01 | End: 2024-06-01

## 2024-06-01 RX ORDER — MAGNESIUM HYDROXIDE/ALUMINUM HYDROXICE/SIMETHICONE 120; 1200; 1200 MG/30ML; MG/30ML; MG/30ML
30 SUSPENSION ORAL ONCE
Status: COMPLETED | OUTPATIENT
Start: 2024-06-01 | End: 2024-06-01

## 2024-06-01 RX ORDER — LEVOTHYROXINE SODIUM 0.05 MG/1
50 TABLET ORAL DAILY
COMMUNITY
Start: 2024-05-06

## 2024-06-01 RX ORDER — ENOXAPARIN SODIUM 100 MG/ML
40 INJECTION SUBCUTANEOUS DAILY
Status: DISCONTINUED | OUTPATIENT
Start: 2024-06-02 | End: 2024-06-01

## 2024-06-01 RX ORDER — ASCORBIC ACID 500 MG
1000 TABLET ORAL DAILY
Status: DISCONTINUED | OUTPATIENT
Start: 2024-06-02 | End: 2024-06-04 | Stop reason: HOSPADM

## 2024-06-01 RX ORDER — ONDANSETRON 2 MG/ML
4 INJECTION INTRAMUSCULAR; INTRAVENOUS EVERY 8 HOURS PRN
Status: DISCONTINUED | OUTPATIENT
Start: 2024-06-01 | End: 2024-06-04 | Stop reason: HOSPADM

## 2024-06-01 RX ORDER — LISINOPRIL 10 MG/1
10 TABLET ORAL DAILY
Status: DISCONTINUED | OUTPATIENT
Start: 2024-06-02 | End: 2024-06-02

## 2024-06-01 RX ORDER — DICYCLOMINE HCL 20 MG
20 TABLET ORAL 4 TIMES DAILY PRN
Status: DISCONTINUED | OUTPATIENT
Start: 2024-06-01 | End: 2024-06-04 | Stop reason: HOSPADM

## 2024-06-01 RX ORDER — ASPIRIN 81 MG/1
324 TABLET, CHEWABLE ORAL ONCE
Status: COMPLETED | OUTPATIENT
Start: 2024-06-01 | End: 2024-06-01

## 2024-06-01 RX ORDER — HEPARIN SODIUM 1000 [USP'U]/ML
3900 INJECTION, SOLUTION INTRAVENOUS; SUBCUTANEOUS EVERY 6 HOURS PRN
Status: DISCONTINUED | OUTPATIENT
Start: 2024-06-01 | End: 2024-06-04

## 2024-06-01 RX ORDER — PANTOPRAZOLE SODIUM 40 MG/1
40 TABLET, DELAYED RELEASE ORAL
Status: DISCONTINUED | OUTPATIENT
Start: 2024-06-02 | End: 2024-06-01

## 2024-06-01 RX ORDER — LISINOPRIL AND HYDROCHLOROTHIAZIDE 20; 12.5 MG/1; MG/1
1 TABLET ORAL DAILY
COMMUNITY
Start: 2024-05-13

## 2024-06-01 RX ADMIN — PANTOPRAZOLE SODIUM 40 MG: 40 INJECTION, POWDER, FOR SOLUTION INTRAVENOUS at 22:14

## 2024-06-01 RX ADMIN — ONDANSETRON 4 MG: 2 INJECTION INTRAMUSCULAR; INTRAVENOUS at 23:48

## 2024-06-01 RX ADMIN — SODIUM CHLORIDE 1000 ML: 0.9 INJECTION, SOLUTION INTRAVENOUS at 17:06

## 2024-06-01 RX ADMIN — MECLIZINE HYDROCHLORIDE 25 MG: 12.5 TABLET ORAL at 17:04

## 2024-06-01 RX ADMIN — HEPARIN SODIUM 12 UNITS/KG/HR: 10000 INJECTION, SOLUTION INTRAVENOUS at 23:05

## 2024-06-01 RX ADMIN — MECLIZINE HYDROCHLORIDE 25 MG: 25 TABLET ORAL at 22:15

## 2024-06-01 RX ADMIN — HEPARIN SODIUM 3900 UNITS: 1000 INJECTION INTRAVENOUS; SUBCUTANEOUS at 23:10

## 2024-06-01 RX ADMIN — ALUMINUM HYDROXIDE, MAGNESIUM HYDROXIDE, AND DIMETHICONE 30 ML: 200; 20; 200 SUSPENSION ORAL at 23:01

## 2024-06-01 RX ADMIN — SUCRALFATE 1 G: 1 TABLET ORAL at 22:14

## 2024-06-01 RX ADMIN — NITROGLYCERIN 0.4 MG: 0.4 TABLET, ORALLY DISINTEGRATING SUBLINGUAL at 16:37

## 2024-06-01 RX ADMIN — CLOPIDOGREL BISULFATE 600 MG: 75 TABLET ORAL at 23:48

## 2024-06-01 RX ADMIN — ONDANSETRON 4 MG: 2 INJECTION INTRAMUSCULAR; INTRAVENOUS at 16:56

## 2024-06-01 RX ADMIN — ASPIRIN 81 MG 324 MG: 81 TABLET ORAL at 16:37

## 2024-06-01 NOTE — ED PROVIDER NOTES
History  Chief Complaint   Patient presents with    Dizziness     Patient arrived via EMS c/o dizziness and chest pain that began approx 1 hr ago.     64-year-old female history of GERD, vertigo brought in by EMS with chest pain and ongoing vertigo.  Was noted by EMS to have subtle ECG changes including depression V2, T wave inversion in aVL.  Patient states that she has been having chest pain and vertigo with some shortness of breath for the past several hours, not entirely sure of the but thinks at least 3 hours.  Having some vertigo symptoms, tried taking her home meclizine without improvement which is unusual for her.  Not able to tolerate lunch.  Never had pain like this before.  Does not think it is like her normal GERD.  No prior history of CAD.        Prior to Admission Medications   Prescriptions Last Dose Informant Patient Reported? Taking?   Ascorbic Acid (vitamin C) 1000 MG tablet  Self Yes No   Sig: Take 1,000 mg by mouth daily   Multiple Vitamins-Minerals (multivitamin with minerals) tablet  Self Yes No   Sig: Take 1 tablet by mouth daily   atorvastatin (LIPITOR) 40 mg tablet   Yes No   Sig: Take 40 mg by mouth daily   cholecalciferol (VITAMIN D3) 1,000 units tablet  Self Yes No   Sig: Take 1,000 Units by mouth daily   dicyclomine (BENTYL) 20 mg tablet   No No   Sig: Take 1 tablet (20 mg total) by mouth every 6 (six) hours as needed (abdominal pain) for up to 5 days   famotidine (PEPCID) 20 mg tablet   No No   Sig: Take 1 tablet (20 mg total) by mouth daily for 5 days   levothyroxine 50 mcg tablet   Yes Yes   Sig: Take 50 mcg by mouth daily   lidocaine (Lidoderm) 5 %   No No   Sig: Apply 1 patch topically over 12 hours daily for 5 days Remove & Discard patch within 12 hours or as directed by MD   lisinopril (ZESTRIL) 10 mg tablet  Self Yes No   Sig: Take 10 mg by mouth daily   lisinopril-hydrochlorothiazide (PRINZIDE,ZESTORETIC) 20-12.5 MG per tablet   Yes Yes   Sig: Take 1 tablet by mouth daily    meclizine (ANTIVERT) 25 mg tablet  Self Yes No   Sig: Take 25 mg by mouth 3 (three) times a day as needed   omeprazole (PriLOSEC) 20 mg delayed release capsule  Self Yes No   Sig: Take 20 mg by mouth daily   sucralfate (CARAFATE) 1 g tablet   No No   Sig: Take 1 tablet (1 g total) by mouth 4 (four) times a day for 7 days   vitamin E 100 UNIT capsule  Self Yes No   Sig: Take 100 Units by mouth daily      Facility-Administered Medications: None       Past Medical History:   Diagnosis Date    Disease of thyroid gland     High cholesterol     Hypertension     Vertigo        No past surgical history on file.    No family history on file.  I have reviewed and agree with the history as documented.    E-Cigarette/Vaping    E-Cigarette Use Never Assessed      E-Cigarette/Vaping Substances     Social History     Tobacco Use    Smoking status: Never    Smokeless tobacco: Never   Substance Use Topics    Alcohol use: Not Currently    Drug use: Not Currently        Review of Systems   Constitutional:  Negative for activity change, fever and unexpected weight change.   Respiratory:  Positive for chest tightness and shortness of breath. Negative for cough.    Cardiovascular:  Positive for chest pain. Negative for palpitations.   Gastrointestinal:  Positive for nausea. Negative for abdominal pain, diarrhea and vomiting.   Genitourinary:  Negative for dysuria and hematuria.   Skin:  Negative for wound.   Allergic/Immunologic: Negative for immunocompromised state.   Neurological:  Positive for dizziness. Negative for syncope.   All other systems reviewed and are negative.      Physical Exam  ED Triage Vitals   Temperature Pulse Respirations Blood Pressure SpO2   06/01/24 1631 06/01/24 1631 06/01/24 1631 06/01/24 1631 06/01/24 1631   97.9 °F (36.6 °C) 74 18 165/74 100 %      Temp Source Heart Rate Source Patient Position - Orthostatic VS BP Location FiO2 (%)   06/01/24 1631 06/01/24 1631 06/01/24 1631 06/01/24 1631 --   Oral Monitor  Lying Right arm       Pain Score       06/01/24 1937       No Pain             Orthostatic Vital Signs  Vitals:    06/01/24 2023 06/01/24 2025 06/01/24 2030 06/01/24 2340   BP: 145/79 135/81 143/76 132/75   Pulse: 70 78 80 84   Patient Position - Orthostatic VS: Sitting - Orthostatic VS Standing - Orthostatic VS Standing for 3 minutes - Orthostatic VS        Physical Exam  Vitals and nursing note reviewed.   Constitutional:       General: She is in acute distress.      Appearance: She is ill-appearing and diaphoretic.   HENT:      Head: Normocephalic and atraumatic.      Right Ear: External ear normal.      Left Ear: External ear normal.      Nose: Nose normal.      Mouth/Throat:      Mouth: Mucous membranes are moist.   Eyes:      General: No scleral icterus.     Extraocular Movements: Extraocular movements intact.      Conjunctiva/sclera: Conjunctivae normal.   Cardiovascular:      Rate and Rhythm: Normal rate and regular rhythm.      Pulses: Normal pulses.           Radial pulses are 2+ on the right side.        Dorsalis pedis pulses are 2+ on the right side and 2+ on the left side.      Heart sounds: Normal heart sounds, S1 normal and S2 normal. No murmur heard.  Pulmonary:      Effort: Pulmonary effort is normal. No respiratory distress.      Breath sounds: Normal breath sounds. No stridor. No wheezing.   Abdominal:      Palpations: Abdomen is soft.      Tenderness: There is no abdominal tenderness.   Musculoskeletal:         General: Normal range of motion.      Cervical back: Normal range of motion.   Skin:     General: Skin is moist.      Coloration: Skin is pale.   Neurological:      General: No focal deficit present.      Mental Status: She is alert and oriented to person, place, and time.   Psychiatric:         Mood and Affect: Mood normal.         ED Medications  Medications   Cholecalciferol (VITAMIN D3) tablet 1,000 Units (has no administration in time range)   ascorbic acid (VITAMIN C) tablet 1,000 mg  (has no administration in time range)   levothyroxine tablet 50 mcg (has no administration in time range)   lisinopril (ZESTRIL) tablet 10 mg (has no administration in time range)   vitamin E (TOCOPHEROL) capsule 400 Units (has no administration in time range)   multi-electrolyte (PLASMALYTE-A/ISOLYTE-S PH 7.4) IV solution (100 mL/hr Intravenous Not Given 6/1/24 2258)   ondansetron (ZOFRAN) injection 4 mg (4 mg Intravenous Given 6/1/24 2348)   pantoprazole (PROTONIX) injection 40 mg (40 mg Intravenous Given 6/1/24 2214)   dicyclomine (BENTYL) tablet 20 mg (has no administration in time range)   loperamide (IMODIUM) capsule 2 mg (has no administration in time range)   meclizine (ANTIVERT) tablet 25 mg (25 mg Oral Given 6/1/24 2215)   sucralfate (CARAFATE) tablet 1 g (1 g Oral Given 6/1/24 2214)   heparin (porcine) 25,000 units in 0.45% NaCl 250 mL infusion (premix) (12 Units/kg/hr × 65 kg (Order-Specific) Intravenous New Bag 6/1/24 2305)   heparin (porcine) injection 3,900 Units (has no administration in time range)   heparin (porcine) injection 1,950 Units (has no administration in time range)   morphine injection 1 mg (has no administration in time range)   atorvastatin (LIPITOR) tablet 80 mg (has no administration in time range)   sodium chloride 0.9 % bolus 1,000 mL (1,000 mL Intravenous New Bag 6/1/24 1706)   aspirin chewable tablet 324 mg (324 mg Oral Given 6/1/24 1637)   nitroglycerin (NITROSTAT) SL tablet 0.4 mg (0.4 mg Sublingual Given 6/1/24 1637)   ondansetron (ZOFRAN) injection 4 mg (4 mg Intravenous Given 6/1/24 1656)   meclizine (ANTIVERT) tablet 25 mg (25 mg Oral Given 6/1/24 1704)   heparin (porcine) injection 3,900 Units (3,900 Units Intravenous Given 6/1/24 2310)   aluminum-magnesium hydroxide-simethicone (MAALOX) oral suspension 30 mL (30 mL Oral Given 6/1/24 2301)   clopidogrel (PLAVIX) tablet 600 mg (600 mg Oral Given 6/1/24 2348)       Diagnostic Studies  Results Reviewed       Procedure  Component Value Units Date/Time    HS Troponin I 4hr [[id number]]  (Abnormal) Collected: 06/01/24 2127    Lab Status: Final result Specimen: Blood from Arm, Left Updated: 06/01/24 2202     hs TnI 4hr 444 ng/L      Delta 4hr hsTnI 441 ng/L     HS Troponin I 2hr [[id number]]  (Abnormal) Collected: 06/01/24 1827    Lab Status: Final result Specimen: Blood from Arm, Right Updated: 06/01/24 1900     hs TnI 2hr 48 ng/L      Delta 2hr hsTnI 45 ng/L     Platelet count [[id number]]     Lab Status: No result Specimen: Blood     B-Type Natriuretic Peptide(BNP) [[id number]]  (Normal) Collected: 06/01/24 1627    Lab Status: Final result Specimen: Blood from Arm, Left Updated: 06/01/24 1715     BNP 72 pg/mL     HS Troponin 0hr (reflex protocol) [[id number]]  (Normal) Collected: 06/01/24 1627    Lab Status: Final result Specimen: Blood from Arm, Left Updated: 06/01/24 1705     hs TnI 0hr 3 ng/L     Comprehensive metabolic panel [[id number]] Collected: 06/01/24 1626    Lab Status: Final result Specimen: Blood from Arm, Left Updated: 06/01/24 1704     Sodium 135 mmol/L      Potassium 3.8 mmol/L      Chloride 101 mmol/L      CO2 24 mmol/L      ANION GAP 10 mmol/L      BUN 14 mg/dL      Creatinine 0.74 mg/dL      Glucose 112 mg/dL      Calcium 9.9 mg/dL      AST 22 U/L      ALT 21 U/L      Alkaline Phosphatase 53 U/L      Total Protein 8.1 g/dL      Albumin 4.7 g/dL      Total Bilirubin 0.68 mg/dL      eGFR 86 ml/min/1.73sq m     Narrative:      [employer] guidelines for Chronic Kidney Disease (CKD):     Stage 1 with normal or high GFR (GFR > 90 mL/min/1.73 square meters)    Stage 2 Mild CKD (GFR = 60-89 mL/min/1.73 square meters)    Stage 3A Moderate CKD (GFR = 45-59 mL/min/1.73 square meters)    Stage 3B Moderate CKD (GFR = 30-44 mL/min/1.73 square meters)    Stage 4 Severe CKD (GFR = 15-29 mL/min/1.73 square meters)    Stage 5 End Stage CKD (GFR <15 mL/min/1.73 square meters)  Note: GFR calculation is accurate  only with a steady state creatinine    CBC and differential [[id number]] Collected: 06/01/24 1626    Lab Status: Final result Specimen: Blood from Arm, Left Updated: 06/01/24 1642     WBC 8.15 Thousand/uL      RBC 4.31 Million/uL      Hemoglobin 13.3 g/dL      Hematocrit 39.3 %      MCV 91 fL      MCH 30.9 pg      MCHC 33.8 g/dL      RDW 12.0 %      MPV 9.9 fL      Platelets 264 Thousands/uL      nRBC 0 /100 WBCs      Segmented % 65 %      Immature Grans % 1 %      Lymphocytes % 23 %      Monocytes % 9 %      Eosinophils Relative 1 %      Basophils Relative 1 %      Absolute Neutrophils 5.35 Thousands/µL      Absolute Immature Grans 0.04 Thousand/uL      Absolute Lymphocytes 1.85 Thousands/µL      Absolute Monocytes 0.76 Thousand/µL      Eosinophils Absolute 0.10 Thousand/µL      Basophils Absolute 0.05 Thousands/µL     Fingerstick Glucose (POCT) [[id number]]  (Normal) Collected: 06/01/24 1621    Lab Status: Final result Specimen: Blood Updated: 06/01/24 1624     POC Glucose 106 mg/dl                    XR chest 1 view portable   ED Interpretation by Usama Garcia MD (06/01 1642)   Chest xray independently interpreted by me.  No acute cardiopulmonary disease. No subdiaphragmatic free air. No fracture or dislocation        Final Result by Mirtha Tse MD (06/01 1955)      No acute cardiopulmonary disease.            Workstation performed: [id number]         XR chest portable    (Results Pending)         Procedures  ECG 12 Lead Documentation Only    Date/Time: 6/1/2024 4:19 PM    Performed by: Usama Garcia MD  Authorized by: Usama Garcia MD    Indications / Diagnosis:  Cp  ECG reviewed by me, the ED Provider: yes    Patient location:  ED  Previous ECG:     Previous ECG:  Compared to current    Comparison ECG info:  1/26/24    Similarity:  Changes noted (New depressions V2, new T wave inversion in aVL)    Comparison to cardiac monitor: Yes    Interpretation:     Interpretation: non-specific    Rate:      ECG rate:  80    ECG rate assessment: normal    Rhythm:     Rhythm: sinus rhythm    Ectopy:     Ectopy: none    QRS:     QRS axis:  Normal    QRS intervals:  Normal  Conduction:     Conduction: normal    ST segments:     ST segments:  Non-specific    Depression:  V2  T waves:     T waves: inverted      Inverted:  AVL  ECG 12 Lead Documentation Only    Date/Time: 6/1/2024 4:45 PM    Performed by: Usama Garcia MD  Authorized by: Usama Garcia MD    Indications / Diagnosis:  CP  ECG reviewed by me, the ED Provider: yes    Patient location:  ED  Previous ECG:     Previous ECG:  Compared to current    Comparison ECG info:  Today from 1619    Similarity:  No change    Comparison to cardiac monitor: Yes    Interpretation:     Interpretation: non-specific    Rate:     ECG rate:  63    ECG rate assessment: normal    Rhythm:     Rhythm: sinus rhythm    Ectopy:     Ectopy: none    QRS:     QRS axis:  Normal    QRS intervals:  Normal  Conduction:     Conduction: normal    ST segments:     ST segments:  Normal  T waves:     T waves: inverted      Inverted:  AVL  Comments:      This was a posterior ECG.  No elevations or depressions in V7-V9  ECG 12 Lead Documentation Only    Date/Time: 6/1/2024 4:55 PM    Performed by: Usama Garcia MD  Authorized by: Usama Garcia MD    Indications / Diagnosis:  CP  ECG reviewed by me, the ED Provider: yes    Patient location:  ED  Previous ECG:     Previous ECG:  Compared to current    Comparison ECG info:  Today from 1619    Similarity:  No change    Comparison to cardiac monitor: Yes    Interpretation:     Interpretation: normal    Rate:     ECG rate:  72    ECG rate assessment: normal    Rhythm:     Rhythm: sinus rhythm    Ectopy:     Ectopy: none    QRS:     QRS axis:  Normal    QRS intervals:  Normal  Conduction:     Conduction: normal    ST segments:     ST segments:  Non-specific    Depression:  V2  T waves:     T waves: inverted      Inverted:  AVL        ED Course  ED  Course as of 06/01/24 2349   Sat Jun 01, 2024   1642 XR chest 1 view portable  Chest xray independently interpreted by me.  No acute cardiopulmonary disease. No subdiaphragmatic free air. No fracture or dislocation       1642 WBC: 8.15   1645 Blood Pressure(!): 86/47   1655 Blood Pressure: 119/56   1712 hs TnI 0hr: 3   1712 Comprehensive metabolic panel  wnl   1729 BNP: 72             HEART Risk Score      Flowsheet Row Most Recent Value   Heart Score Risk Calculator    History 2 Filed at: 06/01/2024 1717   ECG 1 Filed at: 06/01/2024 1717   Age 1 Filed at: 06/01/2024 1717   Risk Factors 1 Filed at: 06/01/2024 1717   Troponin 0 Filed at: 06/01/2024 1717   HEART Score 5 Filed at: 06/01/2024 1717                        SBIRT 20yo+      Flowsheet Row Most Recent Value   Initial Alcohol Screen: US AUDIT-C     1. How often do you have a drink containing alcohol? 0 Filed at: 06/01/2024 1700   2. How many drinks containing alcohol do you have on a typical day you are drinking?  0 Filed at: 06/01/2024 1700   3b. FEMALE Any Age, or MALE 65+: How often do you have 4 or more drinks on one occassion? 0 Filed at: 06/01/2024 1700   Audit-C Score 0 Filed at: 06/01/2024 1700   JENNIE: How many times in the past year have you...    Used an illegal drug or used a prescription medication for non-medical reasons? Never Filed at: 06/01/2024 1700                  Medical Decision Making  Patient's presentation with acute chest pain, pale/diaphoretic, some shortness of breath, subtle ECG changes highly concerning for possibility of ACS.  Was given 324 mg aspirin.  Was given sublingual nitroglycerin but did transiently make her hypotensive, started vomiting, but did improve her pain however due to the side effects opting to not treat with further nitroglycerin.  Did multiple repeat ECGs including a posterior ECG given depression V2, thankfully did not evolve and did not see any signs of STEMI.  Initial troponin 3 however concerned that this  could be within the window where he would still elevate so we will continue getting delta troponins.    In terms of differential, do not suspect PE given lack of hypoxia, tachypnea, tachycardia, PE risk factors.  Do not suspect dissection given lack of hypertension, equal pulses, recent CT abdomen/pelvis which did not show any abnormality of the aorta, chest x-ray which did not show mediastinal widening.  Also no signs of acute infection.  Do not think this could be related to GERD since no abdominal tenderness.    Patient was ultimately admitted for rule out ACS      Problems Addressed:  Chest pain, unspecified type: complicated acute illness or injury with systemic symptoms that poses a threat to life or bodily functions  Vertigo: chronic illness or injury    Amount and/or Complexity of Data Reviewed  Independent Historian: spouse and EMS  Labs: ordered. Decision-making details documented in ED Course.  Radiology: ordered and independent interpretation performed. Decision-making details documented in ED Course.  ECG/medicine tests: ordered and independent interpretation performed.    Risk  OTC drugs.  Prescription drug management.  Decision regarding hospitalization.  Decision not to resuscitate or to de-escalate care because of poor prognosis.          Disposition  Final diagnoses:   Chest pain, unspecified type   Vertigo     Time reflects when diagnosis was documented in both MDM as applicable and the Disposition within this note       Time User Action Codes Description Comment    6/1/2024  5:30 PM Usama Garcia Add [R07.9] Chest pain, unspecified type     6/1/2024  5:30 PM Usama Garcia Add [R42] Vertigo     6/1/2024 10:19 PM Radha Olson Add [I24.9] ACS (acute coronary syndrome) (HCC)           ED Disposition       ED Disposition   Admit    Condition   Stable    Date/Time   Sat Jun 1, 2024 4019    Comment   Case was discussed with Dr. Corey and the patient's admission status was agreed to be  Admission Status: observation status to the service of Dr. Corey .               Follow-up Information    None         Current Discharge Medication List        CONTINUE these medications which have NOT CHANGED    Details   levothyroxine 50 mcg tablet Take 50 mcg by mouth daily      lisinopril-hydrochlorothiazide (PRINZIDE,ZESTORETIC) 20-12.5 MG per tablet Take 1 tablet by mouth daily      Ascorbic Acid (vitamin C) 1000 MG tablet Take 1,000 mg by mouth daily      atorvastatin (LIPITOR) 40 mg tablet Take 40 mg by mouth daily      cholecalciferol (VITAMIN D3) 1,000 units tablet Take 1,000 Units by mouth daily      dicyclomine (BENTYL) 20 mg tablet Take 1 tablet (20 mg total) by mouth every 6 (six) hours as needed (abdominal pain) for up to 5 days  Qty: 20 tablet, Refills: 0    Associated Diagnoses: Pain of upper abdomen      famotidine (PEPCID) 20 mg tablet Take 1 tablet (20 mg total) by mouth daily for 5 days  Qty: 5 tablet, Refills: 0    Associated Diagnoses: Pain of upper abdomen      lidocaine (Lidoderm) 5 % Apply 1 patch topically over 12 hours daily for 5 days Remove & Discard patch within 12 hours or as directed by MD  Qty: 5 patch, Refills: 0    Associated Diagnoses: Pain of upper abdomen      lisinopril (ZESTRIL) 10 mg tablet Take 10 mg by mouth daily      meclizine (ANTIVERT) 25 mg tablet Take 25 mg by mouth 3 (three) times a day as needed      Multiple Vitamins-Minerals (multivitamin with minerals) tablet Take 1 tablet by mouth daily      omeprazole (PriLOSEC) 20 mg delayed release capsule Take 20 mg by mouth daily      sucralfate (CARAFATE) 1 g tablet Take 1 tablet (1 g total) by mouth 4 (four) times a day for 7 days  Qty: 28 tablet, Refills: 0    Associated Diagnoses: Pain of upper abdomen      vitamin E 100 UNIT capsule Take 100 Units by mouth daily           No discharge procedures on file.    PDMP Review       None             ED Provider  Attending physically available and evaluated Siena SMITH  Honorio. I managed the patient along with the ED Attending.    Electronically Signed by           Usama Garcia MD  06/01/24 6218

## 2024-06-01 NOTE — ASSESSMENT & PLAN NOTE
"Assessment  PTA reports that she cleaning when she began to experience a throat \"squeezing\" sensation that felt like heart burn   Noted onset of weakness and she felt nauseous with one episode of emesis and diarrhea   Reports normal PO intake prior   No recent sick contacts or changes in medications   Given 324 mg of aspirin in the ED  Administered meclizine with minimal improvement   Nitroglycerin administered and patient became hypotensive and felt a \"burning\" sensation   In the ED, troponins negative, BNP WNL, EKG with T-wave inversion in lead avL   Differential includes vertigo, vasovagal syncope, onset of viral illness, worsening GERD     Plan   Gentle hydration with IVF 75 mL/hr  Zofran 4 mg IV every 8 hours as needed for nausea, vomiting  Protonix IV 40 mg BID   Bentyl 20 mg QID for cramping  Carafate   Meclizine 25 mg every 8 hours as needed  Imodium as needed for diarrhea  Check orthostatic vitals  Encourage PO intake   Follow-up AM labs   Telemetry  "

## 2024-06-01 NOTE — ASSESSMENT & PLAN NOTE
"Assessment  PTA reports that she cleaning when she began to experience a throat \"squeezing\" sensation that felt like heart burn   Noted onset of weakness and she felt nauseous with one episode of emesis and diarrhea   Reports normal PO intake prior   No recent sick contacts or changes in medications   Given 324 mg of aspirin in the ED  Administered meclizine with minimal improvement   Nitroglycerin administered and patient became hypotensive and felt a \"burning\" sensation   In the ED, troponins negative, BNP WNL, EKG with T-wave inversion in lead avL   Differential includes vertigo, vasovagal syncope, onset of viral illness, worsening GERD     Plan   Trend troponins  Gentle hydration with IVF 75 mL/hr  Zofran 4 mg IV every 8 hours as needed for nausea, vomiting  Protonix IV 40 mg BID   Bentyl 20 mg QID for cramping  Carafate   Meclizine 25 mg every 8 hours as needed  Imodium as needed for diarrhea  Check orthostatic vitals  Encourage PO intake   Follow-up AM labs   Telemetry  "

## 2024-06-01 NOTE — H&P
"[facility]  H&P  Name: Siena Olmedo 64 y.o. female I MRN: [id number]  Unit/Bed#: ED-42 I Date of Admission: 6/1/2024   Date of Service: 6/1/2024 I Hospital Day: 0      Assessment & Plan   * Dizziness  Assessment & Plan  Assessment  PTA reports that she cleaning when she began to experience a throat \"squeezing\" sensation that felt like heart burn   Noted onset of weakness and she felt nauseous with one episode of emesis and diarrhea   Reports normal PO intake prior   No recent sick contacts or changes in medications   Given 324 mg of aspirin in the ED  Administered meclizine with minimal improvement   Nitroglycerin administered and patient became hypotensive and felt a \"burning\" sensation   In the ED, troponins negative, BNP WNL, EKG with T-wave inversion in lead avL   Differential includes vertigo, vasovagal syncope, onset of viral illness, worsening GERD     Plan   Gentle hydration with IVF 75 mL/hr  Zofran 4 mg IV every 8 hours as needed for nausea, vomiting  Protonix IV 40 mg BID   Bentyl 20 mg QID for cramping  Carafate   Meclizine 25 mg every 8 hours as needed  Imodium as needed for diarrhea  Check orthostatic vitals  Encourage PO intake   Follow-up AM labs   Telemetry    Vertigo  Assessment & Plan  Hx of vertigo   Meclizine 25 mg every 8 hours      Hypothyroidism  Assessment & Plan  Continue home levothyroxine 50 mcg    Benign hypertension  Assessment & Plan  Continue home lisinopril 10 mg     Mixed hyperlipidemia  Assessment & Plan  Continue home Lipitor 40 mg            VTE Pharmacologic Prophylaxis: VTE Score: 3 Moderate Risk (Score 3-4) - Pharmacological DVT Prophylaxis Ordered: enoxaparin (Lovenox).  Code Status: Level 1 - Full Code   Discussion with family: Updated  (father) at bedside.    Anticipated Length of Stay: Patient will be admitted on an observation basis with an anticipated length of stay of less than 2 midnights secondary to dizziness.    Chief " "Complaint: Dizziness    History of Present Illness:  Siena Olmedo is a 64 y.o. female with a PMH of vertigo, HTN, HLD, and hypothyroidism who presents with atypical chest pain. Patient reports that she was cleaning at home when she began to experience a \"heart burn-like\" sensation throughout her throat. She denies radiation to other areas. At the onset of this sensation, she reported feeling weak so she laid down. When she laid down, she experienced the sensation of the room spinning. She also noted nausea with one episode of vomiting and diarrhea. She denies recent illness or sick contacts. She endorsed normal PO intake.     In the ED, troponin's were normal. EKG revealed T-wave inversion in lead avL. She was given nitroglycerin which caused hypotension and she experienced a stinging sensation. CXR negative.     Review of Systems:  Review of Systems   Constitutional:  Positive for chills and fatigue. Negative for appetite change, fever and unexpected weight change.   HENT:  Negative for ear pain, rhinorrhea, sinus pressure and sore throat.    Eyes:  Negative for discharge, redness and visual disturbance.   Respiratory:  Negative for cough, chest tightness and shortness of breath.    Cardiovascular:  Positive for chest pain. Negative for palpitations and leg swelling.   Gastrointestinal:  Positive for diarrhea, nausea and vomiting. Negative for abdominal pain.   Genitourinary:  Negative for dysuria and frequency.   Neurological:  Positive for dizziness and light-headedness. Negative for syncope and numbness.   Psychiatric/Behavioral:  Negative for agitation and behavioral problems.        Past Medical and Surgical History:   Past Medical History:   Diagnosis Date    Disease of thyroid gland     High cholesterol     Hypertension     Vertigo        No past surgical history on file.    Meds/Allergies:  Prior to Admission medications    Medication Sig Start Date End Date Taking? Authorizing Provider "   levothyroxine 50 mcg tablet Take 50 mcg by mouth daily 5/6/24  Yes Historical Provider, MD   lisinopril-hydrochlorothiazide (PRINZIDE,ZESTORETIC) 20-12.5 MG per tablet Take 1 tablet by mouth daily 5/13/24  Yes Historical Provider, MD   Ascorbic Acid (vitamin C) 1000 MG tablet Take 1,000 mg by mouth daily    Historical Provider, MD   atorvastatin (LIPITOR) 40 mg tablet Take 40 mg by mouth daily    Historical Provider, MD   cholecalciferol (VITAMIN D3) 1,000 units tablet Take 1,000 Units by mouth daily    Historical Provider, MD   dicyclomine (BENTYL) 20 mg tablet Take 1 tablet (20 mg total) by mouth every 6 (six) hours as needed (abdominal pain) for up to 5 days 1/26/24 1/31/24  Raven Villarreal MD   famotidine (PEPCID) 20 mg tablet Take 1 tablet (20 mg total) by mouth daily for 5 days 1/26/24 1/31/24  Raven Villarreal MD   lidocaine (Lidoderm) 5 % Apply 1 patch topically over 12 hours daily for 5 days Remove & Discard patch within 12 hours or as directed by MD 1/26/24 1/31/24  Raven Villarreal MD   lisinopril (ZESTRIL) 10 mg tablet Take 10 mg by mouth daily 10/29/20   Historical Provider, MD   meclizine (ANTIVERT) 25 mg tablet Take 25 mg by mouth 3 (three) times a day as needed 11/5/20   Historical Provider, MD   Multiple Vitamins-Minerals (multivitamin with minerals) tablet Take 1 tablet by mouth daily    Historical Provider, MD   omeprazole (PriLOSEC) 20 mg delayed release capsule Take 20 mg by mouth daily    Historical Provider, MD   sucralfate (CARAFATE) 1 g tablet Take 1 tablet (1 g total) by mouth 4 (four) times a day for 7 days 1/26/24 2/2/24  Raven Villarreal MD   vitamin E 100 UNIT capsule Take 100 Units by mouth daily    Historical Provider, MD PITT have reviewed home medications with patient personally.    Allergies:   Allergies   Allergen Reactions    Sulfa Antibiotics Rash       Social History:  Marital Status: /Civil Union   Occupation:  staff  Patient Pre-hospital Living Situation:  Home  Patient Pre-hospital Level of Mobility: walks  Patient Pre-hospital Diet Restrictions: None  Substance Use History:   Social History     Substance and Sexual Activity   Alcohol Use Not Currently     Social History     Tobacco Use   Smoking Status Never   Smokeless Tobacco Never     Social History     Substance and Sexual Activity   Drug Use Not Currently       Family History:  No family history on file.    Physical Exam:     Vitals:   Blood Pressure: 132/60 (06/01/24 1815)  Pulse: 73 (06/01/24 1815)  Temperature: 97.9 °F (36.6 °C) (06/01/24 1631)  Temp Source: Oral (06/01/24 1631)  Respirations: 20 (06/01/24 1715)  Weight - Scale: 64.9 kg (143 lb 1.3 oz) (06/01/24 1629)  SpO2: 98 % (06/01/24 1815)    Physical Exam  Constitutional:       Appearance: She is normal weight. She is ill-appearing.   HENT:      Head: Normocephalic and atraumatic.      Nose: Nose normal.      Mouth/Throat:      Mouth: Mucous membranes are moist.      Pharynx: Oropharynx is clear.   Eyes:      Extraocular Movements: Extraocular movements intact.      Pupils: Pupils are equal, round, and reactive to light.   Cardiovascular:      Rate and Rhythm: Normal rate.      Pulses: Normal pulses.      Heart sounds: Normal heart sounds.   Pulmonary:      Effort: Pulmonary effort is normal.      Breath sounds: Normal breath sounds.   Abdominal:      General: Bowel sounds are normal. There is no distension.      Palpations: Abdomen is soft.      Tenderness: There is no abdominal tenderness.   Musculoskeletal:         General: Normal range of motion.      Cervical back: Normal range of motion and neck supple.   Skin:     General: Skin is warm and dry.      Capillary Refill: Capillary refill takes less than 2 seconds.      Coloration: Skin is pale.   Neurological:      General: No focal deficit present.      Mental Status: She is alert and oriented to person, place, and time.      Cranial Nerves: No cranial nerve deficit.      Motor: No weakness.    Psychiatric:         Mood and Affect: Mood normal.         Behavior: Behavior normal.          Additional Data:     Lab Results:  Results from last 7 days   Lab Units 06/01/24  1626   WBC Thousand/uL 8.15   HEMOGLOBIN g/dL 13.3   HEMATOCRIT % 39.3   PLATELETS Thousands/uL 264   SEGS PCT % 65   LYMPHO PCT % 23   MONO PCT % 9   EOS PCT % 1     Results from last 7 days   Lab Units 06/01/24  1626   SODIUM mmol/L 135   POTASSIUM mmol/L 3.8   CHLORIDE mmol/L 101   CO2 mmol/L 24   BUN mg/dL 14   CREATININE mg/dL 0.74   ANION GAP mmol/L 10   CALCIUM mg/dL 9.9   ALBUMIN g/dL 4.7   TOTAL BILIRUBIN mg/dL 0.68   ALK PHOS U/L 53   ALT U/L 21   AST U/L 22   GLUCOSE RANDOM mg/dL 112         Results from last 7 days   Lab Units 06/01/24  1621   POC GLUCOSE mg/dl 106               Lines/Drains:  Invasive Devices       Peripheral Intravenous Line  Duration             Peripheral IV 06/01/24 Right;Ventral (anterior) Forearm <1 day                        Imaging: Personally reviewed the following imaging: chest xray  XR chest 1 view portable   ED Interpretation by Usama Garcia MD (06/01 1642)   Chest xray independently interpreted by me.  No acute cardiopulmonary disease. No subdiaphragmatic free air. No fracture or dislocation            EKG and Other Studies Reviewed on Admission:   EKG: NSR. HR 76.    ** Please Note: This note has been constructed using a voice recognition system. **

## 2024-06-02 PROBLEM — R07.9 CHEST PAIN: Status: ACTIVE | Noted: 2024-06-02

## 2024-06-02 LAB
2HR DELTA HS TROPONIN: 958 NG/L
4HR DELTA HS TROPONIN: 1348 NG/L
ANION GAP SERPL CALCULATED.3IONS-SCNC: 9 MMOL/L (ref 4–13)
APTT PPP: 60 SECONDS (ref 23–37)
APTT PPP: 72 SECONDS (ref 23–37)
APTT PPP: 81 SECONDS (ref 23–37)
ATRIAL RATE: 60 BPM
ATRIAL RATE: 63 BPM
ATRIAL RATE: 64 BPM
ATRIAL RATE: 65 BPM
ATRIAL RATE: 67 BPM
ATRIAL RATE: 72 BPM
ATRIAL RATE: 74 BPM
ATRIAL RATE: 76 BPM
ATRIAL RATE: 80 BPM
BUN SERPL-MCNC: 13 MG/DL (ref 5–25)
CALCIUM SERPL-MCNC: 9 MG/DL (ref 8.4–10.2)
CARDIAC TROPONIN I PNL SERPL HS: 1684 NG/L
CARDIAC TROPONIN I PNL SERPL HS: 2074 NG/L
CHLORIDE SERPL-SCNC: 101 MMOL/L (ref 96–108)
CHOLEST SERPL-MCNC: 133 MG/DL
CO2 SERPL-SCNC: 25 MMOL/L (ref 21–32)
CREAT SERPL-MCNC: 0.7 MG/DL (ref 0.6–1.3)
ERYTHROCYTE [DISTWIDTH] IN BLOOD BY AUTOMATED COUNT: 12.1 % (ref 11.6–15.1)
EST. AVERAGE GLUCOSE BLD GHB EST-MCNC: 120 MG/DL
GFR SERPL CREATININE-BSD FRML MDRD: 92 ML/MIN/1.73SQ M
GLUCOSE SERPL-MCNC: 118 MG/DL (ref 65–140)
HBA1C MFR BLD: 5.8 %
HCT VFR BLD AUTO: 34.6 % (ref 34.8–46.1)
HDLC SERPL-MCNC: 39 MG/DL
HGB BLD-MCNC: 11.8 G/DL (ref 11.5–15.4)
LDLC SERPL CALC-MCNC: 76 MG/DL (ref 0–100)
LIPASE SERPL-CCNC: 17 U/L (ref 11–82)
MAGNESIUM SERPL-MCNC: 1.6 MG/DL (ref 1.9–2.7)
MAGNESIUM SERPL-MCNC: 1.6 MG/DL (ref 1.9–2.7)
MCH RBC QN AUTO: 30.7 PG (ref 26.8–34.3)
MCHC RBC AUTO-ENTMCNC: 34.1 G/DL (ref 31.4–37.4)
MCV RBC AUTO: 90 FL (ref 82–98)
P AXIS: 112 DEGREES
P AXIS: 23 DEGREES
P AXIS: 53 DEGREES
P AXIS: 57 DEGREES
P AXIS: 63 DEGREES
P AXIS: 64 DEGREES
P AXIS: 64 DEGREES
P AXIS: 72 DEGREES
PHOSPHATE SERPL-MCNC: 2.6 MG/DL (ref 2.3–4.1)
PLATELET # BLD AUTO: 233 THOUSANDS/UL (ref 149–390)
PLATELET # BLD AUTO: 247 THOUSANDS/UL (ref 149–390)
PMV BLD AUTO: 9.7 FL (ref 8.9–12.7)
PMV BLD AUTO: 9.8 FL (ref 8.9–12.7)
POTASSIUM SERPL-SCNC: 3.2 MMOL/L (ref 3.5–5.3)
PR INTERVAL: 130 MS
PR INTERVAL: 132 MS
PR INTERVAL: 132 MS
PR INTERVAL: 134 MS
PR INTERVAL: 136 MS
PR INTERVAL: 138 MS
PR INTERVAL: 142 MS
QRS AXIS: -20 DEGREES
QRS AXIS: -23 DEGREES
QRS AXIS: -25 DEGREES
QRS AXIS: 14 DEGREES
QRS AXIS: 19 DEGREES
QRS AXIS: 22 DEGREES
QRS AXIS: 23 DEGREES
QRS AXIS: 23 DEGREES
QRS AXIS: 28 DEGREES
QRSD INTERVAL: 80 MS
QRSD INTERVAL: 80 MS
QRSD INTERVAL: 82 MS
QRSD INTERVAL: 84 MS
QRSD INTERVAL: 88 MS
QT INTERVAL: 354 MS
QT INTERVAL: 360 MS
QT INTERVAL: 394 MS
QT INTERVAL: 406 MS
QT INTERVAL: 416 MS
QT INTERVAL: 420 MS
QT INTERVAL: 432 MS
QT INTERVAL: 442 MS
QT INTERVAL: 446 MS
QTC INTERVAL: 368 MS
QTC INTERVAL: 380 MS
QTC INTERVAL: 432 MS
QTC INTERVAL: 433 MS
QTC INTERVAL: 454 MS
QTC INTERVAL: 456 MS
QTC INTERVAL: 456 MS
QTC INTERVAL: 461 MS
QTC INTERVAL: 483 MS
RBC # BLD AUTO: 3.84 MILLION/UL (ref 3.81–5.12)
SODIUM SERPL-SCNC: 135 MMOL/L (ref 135–147)
T WAVE AXIS: -22 DEGREES
T WAVE AXIS: -28 DEGREES
T WAVE AXIS: -7 DEGREES
T WAVE AXIS: 100 DEGREES
T WAVE AXIS: 90 DEGREES
T WAVE AXIS: 91 DEGREES
T WAVE AXIS: 93 DEGREES
T WAVE AXIS: 97 DEGREES
T WAVE AXIS: 97 DEGREES
TRIGL SERPL-MCNC: 88 MG/DL
VENTRICULAR RATE: 60 BPM
VENTRICULAR RATE: 63 BPM
VENTRICULAR RATE: 64 BPM
VENTRICULAR RATE: 65 BPM
VENTRICULAR RATE: 67 BPM
VENTRICULAR RATE: 72 BPM
VENTRICULAR RATE: 74 BPM
VENTRICULAR RATE: 76 BPM
VENTRICULAR RATE: 80 BPM
WBC # BLD AUTO: 11.64 THOUSAND/UL (ref 4.31–10.16)

## 2024-06-02 PROCEDURE — 80061 LIPID PANEL: CPT

## 2024-06-02 PROCEDURE — 84484 ASSAY OF TROPONIN QUANT: CPT

## 2024-06-02 PROCEDURE — 93010 ELECTROCARDIOGRAM REPORT: CPT | Performed by: INTERNAL MEDICINE

## 2024-06-02 PROCEDURE — C9113 INJ PANTOPRAZOLE SODIUM, VIA: HCPCS

## 2024-06-02 PROCEDURE — 99232 SBSQ HOSP IP/OBS MODERATE 35: CPT | Performed by: INTERNAL MEDICINE

## 2024-06-02 PROCEDURE — 99223 1ST HOSP IP/OBS HIGH 75: CPT | Performed by: INTERNAL MEDICINE

## 2024-06-02 PROCEDURE — 85730 THROMBOPLASTIN TIME PARTIAL: CPT | Performed by: INTERNAL MEDICINE

## 2024-06-02 PROCEDURE — 85049 AUTOMATED PLATELET COUNT: CPT

## 2024-06-02 PROCEDURE — 85027 COMPLETE CBC AUTOMATED: CPT

## 2024-06-02 PROCEDURE — 80048 BASIC METABOLIC PNL TOTAL CA: CPT

## 2024-06-02 PROCEDURE — 93005 ELECTROCARDIOGRAM TRACING: CPT

## 2024-06-02 PROCEDURE — 83735 ASSAY OF MAGNESIUM: CPT

## 2024-06-02 PROCEDURE — 83036 HEMOGLOBIN GLYCOSYLATED A1C: CPT

## 2024-06-02 RX ORDER — ACETAMINOPHEN 325 MG/1
975 TABLET ORAL EVERY 6 HOURS PRN
Status: DISCONTINUED | OUTPATIENT
Start: 2024-06-02 | End: 2024-06-04 | Stop reason: HOSPADM

## 2024-06-02 RX ORDER — CLOPIDOGREL BISULFATE 75 MG/1
75 TABLET ORAL DAILY
Status: DISCONTINUED | OUTPATIENT
Start: 2024-06-03 | End: 2024-06-04 | Stop reason: HOSPADM

## 2024-06-02 RX ORDER — POTASSIUM CHLORIDE 20 MEQ/1
40 TABLET, EXTENDED RELEASE ORAL ONCE
Status: COMPLETED | OUTPATIENT
Start: 2024-06-02 | End: 2024-06-02

## 2024-06-02 RX ORDER — MAGNESIUM SULFATE HEPTAHYDRATE 40 MG/ML
2 INJECTION, SOLUTION INTRAVENOUS ONCE
Status: COMPLETED | OUTPATIENT
Start: 2024-06-02 | End: 2024-06-02

## 2024-06-02 RX ORDER — ASPIRIN 81 MG/1
81 TABLET, CHEWABLE ORAL DAILY
Status: DISCONTINUED | OUTPATIENT
Start: 2024-06-02 | End: 2024-06-04 | Stop reason: HOSPADM

## 2024-06-02 RX ADMIN — LISINOPRIL 10 MG: 10 TABLET ORAL at 08:32

## 2024-06-02 RX ADMIN — METOPROLOL TARTRATE 25 MG: 25 TABLET, FILM COATED ORAL at 10:22

## 2024-06-02 RX ADMIN — SUCRALFATE 1 G: 1 TABLET ORAL at 10:39

## 2024-06-02 RX ADMIN — PANTOPRAZOLE SODIUM 40 MG: 40 INJECTION, POWDER, FOR SOLUTION INTRAVENOUS at 20:51

## 2024-06-02 RX ADMIN — SUCRALFATE 1 G: 1 TABLET ORAL at 06:42

## 2024-06-02 RX ADMIN — POTASSIUM CHLORIDE 40 MEQ: 1500 TABLET, EXTENDED RELEASE ORAL at 10:22

## 2024-06-02 RX ADMIN — SUCRALFATE 1 G: 1 TABLET ORAL at 21:00

## 2024-06-02 RX ADMIN — LEVOTHYROXINE SODIUM 50 MCG: 50 TABLET ORAL at 05:03

## 2024-06-02 RX ADMIN — HEPARIN SODIUM 12 UNITS/KG/HR: 10000 INJECTION, SOLUTION INTRAVENOUS at 20:51

## 2024-06-02 RX ADMIN — MECLIZINE HYDROCHLORIDE 25 MG: 25 TABLET ORAL at 20:51

## 2024-06-02 RX ADMIN — MECLIZINE HYDROCHLORIDE 25 MG: 25 TABLET ORAL at 12:44

## 2024-06-02 RX ADMIN — Medication 400 UNITS: at 08:31

## 2024-06-02 RX ADMIN — METOPROLOL TARTRATE 25 MG: 25 TABLET, FILM COATED ORAL at 20:51

## 2024-06-02 RX ADMIN — ASPIRIN 81 MG CHEWABLE TABLET 81 MG: 81 TABLET CHEWABLE at 10:23

## 2024-06-02 RX ADMIN — Medication 1000 UNITS: at 08:31

## 2024-06-02 RX ADMIN — MAGNESIUM SULFATE HEPTAHYDRATE 2 G: 40 INJECTION, SOLUTION INTRAVENOUS at 10:22

## 2024-06-02 RX ADMIN — OXYCODONE HYDROCHLORIDE AND ACETAMINOPHEN 1000 MG: 500 TABLET ORAL at 08:32

## 2024-06-02 RX ADMIN — MECLIZINE HYDROCHLORIDE 25 MG: 25 TABLET ORAL at 05:03

## 2024-06-02 RX ADMIN — ACETAMINOPHEN 975 MG: 325 TABLET ORAL at 06:42

## 2024-06-02 RX ADMIN — PANTOPRAZOLE SODIUM 40 MG: 40 INJECTION, POWDER, FOR SOLUTION INTRAVENOUS at 08:31

## 2024-06-02 RX ADMIN — SUCRALFATE 1 G: 1 TABLET ORAL at 16:43

## 2024-06-02 RX ADMIN — ATORVASTATIN CALCIUM 80 MG: 40 TABLET, FILM COATED ORAL at 08:32

## 2024-06-02 NOTE — QUICK NOTE
"Called at bedside for now chest pain and dizziness  Patient admitted earlier today with atypical chest pain and dizziness  On evaluation at bedside  patient is uncomfortable, reports relfux type chest pain, burning, no radiation. Feels its \"hard to swallow\"  VS stable  /76, P 80 SAT 97%, neg ortho BP, Tele reviewed no events  Heart sounds norml      EKG- Twave inversion in inferior/septal  leadS  Trop and CXR Pending  Ddx- NSTEMI, pericarditis , Takotsubo given recent stress test.    Plan  ACS pathway, already Asprin loaded  Would consider Plavix load 600 mg  Atorvastatin 80 m g  Heparin ggte  Pain management with morphine  Telem and BP  Would hold off on nitro given changes in inferior leads- previously hypotensive on administration of nitro 1t 4.45 pm  echocardiogram  Dysphagia screen          "

## 2024-06-02 NOTE — CONSULTS
"Consult - Cardiology   Siena Olmedo 64 y.o. female MRN: [id number]  Unit/Bed#: S -01 Encounter: [id number]            ASSESSMENT:  Chest pain/elevated troponin concerning for NSTEMI  Presents with throat squeezing sensation with chest burning and tightness while cleaning.  Did feel slightly diaphoretic with nausea and 1 episode of vomiting.  Loaded with ASA 324mg x1 on arrival to the ED  Received SL NTG x 1 in the ED but became hypotensive with \"burning\" sensation.  Initial EKG: NSR with, TWI in lead I and aVL (appears new)  CXR: No acute cardiopulmonary disease  TTE ordered and pending (no previous studies available)  Initial troponin 48.   Overnight troponins 444> 726> 1684> 2074  Started on IV heparin gtt, loaded with Plavix 600 mg x1, Lopressor 25mg BID, Lipitor increased to 80mg QD  Hypertension  BP on admit 165/74 >> last /76  On lisinopril 10 mg daily  Hyperlipidemia  On Lipitor 40 mg daily  Chronic vertigo on meclizine  Hypothyroidism  GERD  Family history of premature CAD    PLAN/ DISCUSSION:     Cardiac catheterization tomorrow to define her coronary anatomy  TTE ordered and pending  Continue DAPT with ASA/Plavix, increased statin and beta-blocker  Serial EKGs with the onset of cardiac symptoms  Telemetry monitoring  Avoid NTG for now given hypotensive response.     History of Present Illness:  Siena Olmedo is a 64 y.o. female with cardiac history as noted above who presents with dizziness/lightheadedness sensation with throat squeezing and chest burning/tightness while cleaning her home yesterday.  Lasted about 3 hours and symptoms continued even at rest.  She took her home meclizine given her chronic vertigo without improvement which is unusual for her. Complained of slight sweaty/flushed sensation with nausea and 1 episode of vomiting and could not tolerate her lunch. She has history of GERD but feels different so presented to the ED for evaluation.  EKG on arrival shows " some ST and T wave inversions of lateral and inferior leads.  Initial troponin was negative however subsequent troponins have up trended overnight. She was loaded with aspirin/Plavix and started on IV heparin drip. Statin increased.  Beta-blocker started.   She is currently chest pain-free.She denies any cardiac history in the past. Family history of mother with fatal MI, oldest sister with CABG in the her 50s. Youngest sister with heart transplant in her 50s. Denies tobacco, etoh, illicit drug use.     Past Medical History:        Past Medical History:   Diagnosis Date    Disease of thyroid gland     High cholesterol     Hypertension     Vertigo     No past surgical history on file.     Allergy:        Allergies   Allergen Reactions    Sulfa Antibiotics Rash       Medications:       Prior to Admission medications    Medication Sig Start Date End Date Taking? Authorizing Provider   levothyroxine 50 mcg tablet Take 50 mcg by mouth daily 5/6/24  Yes Historical Provider, MD   lisinopril-hydrochlorothiazide (PRINZIDE,ZESTORETIC) 20-12.5 MG per tablet Take 1 tablet by mouth daily 5/13/24  Yes Historical Provider, MD   Ascorbic Acid (vitamin C) 1000 MG tablet Take 1,000 mg by mouth daily    Historical Provider, MD   atorvastatin (LIPITOR) 40 mg tablet Take 40 mg by mouth daily    Historical Provider, MD   cholecalciferol (VITAMIN D3) 1,000 units tablet Take 1,000 Units by mouth daily    Historical Provider, MD   dicyclomine (BENTYL) 20 mg tablet Take 1 tablet (20 mg total) by mouth every 6 (six) hours as needed (abdominal pain) for up to 5 days 1/26/24 1/31/24  Raven Villarreal MD   famotidine (PEPCID) 20 mg tablet Take 1 tablet (20 mg total) by mouth daily for 5 days 1/26/24 1/31/24  Raven Villarreal MD   lidocaine (Lidoderm) 5 % Apply 1 patch topically over 12 hours daily for 5 days Remove & Discard patch within 12 hours or as directed by MD 1/26/24 1/31/24  Raven Villarreal MD   lisinopril (ZESTRIL) 10 mg tablet Take  10 mg by mouth daily 10/29/20   Historical Provider, MD   meclizine (ANTIVERT) 25 mg tablet Take 25 mg by mouth 3 (three) times a day as needed 11/5/20   Historical Provider, MD   Multiple Vitamins-Minerals (multivitamin with minerals) tablet Take 1 tablet by mouth daily    Historical Provider, MD   omeprazole (PriLOSEC) 20 mg delayed release capsule Take 20 mg by mouth daily    Historical Provider, MD   sucralfate (CARAFATE) 1 g tablet Take 1 tablet (1 g total) by mouth 4 (four) times a day for 7 days 1/26/24 2/2/24  Raven Villarreal MD   vitamin E 100 UNIT capsule Take 100 Units by mouth daily    Historical Provider, MD       Family History:     No family history on file.     Social History:       Social History     Socioeconomic History    Marital status: /Civil Union     Spouse name: Not on file    Number of children: Not on file    Years of education: Not on file    Highest education level: Not on file   Occupational History    Not on file   Tobacco Use    Smoking status: Never    Smokeless tobacco: Never   Vaping Use    Vaping status: Not on file   Substance and Sexual Activity    Alcohol use: Not Currently    Drug use: Not Currently    Sexual activity: Not on file   Other Topics Concern    Not on file   Social History Narrative    Not on file     Social Determinants of Health     Financial Resource Strain: Not on file   Food Insecurity: Not on file   Transportation Needs: Not on file   Physical Activity: Not on file   Stress: Not on file   Social Connections: Not on file   Intimate Partner Violence: Not on file   Housing Stability: Not on file       Weights/BMI:    Wt Readings from Last 3 Encounters:   06/01/24 64.9 kg (143 lb 1.3 oz)   02/07/24 61.7 kg (136 lb)   01/26/24 62.6 kg (138 lb)   , Body mass index is 26.17 kg/m².      ROS:  14 point ROS negative except as outlined above  Remainder review of systems is negative    Exam:  General: Alert, oriented and in no acute distress, cooperative  Head:  Normocephalic, atraumatic.  Eyes: PERRLA. No icterus. Normal Conjunctiva.   Oropharynx: Moist and normal-appearing mucosa  Neck: Supple, symmetrical, trachea midline, JVD not appreciated.   Heart: RRR, no murmur, rub or gallop, S1 & S2 normal   Lungs: Normal air entry, lungs clear to auscultation and no rales, rhonchi or wheezing   Abdomen: Flat, normal findings: bowel sounds normal and soft, non-tender  Lower Limbs:  No pitting edema, 2+ peripheral pulses, capillary refill within normal limits  Musculoskeletal: ROM grossly normal

## 2024-06-02 NOTE — ASSESSMENT & PLAN NOTE
"PTA reports that she cleaning when she began to experience a throat \"squeezing\" sensation that felt like heart burn. Also noted weakness in arms feeling heavy as well as nausea and 1 episode of emesis and diarrhea. Given 324 mg of aspirin in the ED. Nitroglycerin administered and patient became hypotensive which improved with positioning and IVF.  In the ED, troponins initially were negative, BNP WNL, EKG with T-wave inversion in lead avL, CXR with no acute cardiopulmonary process.  Overnight 6/1/6/2: Notified by nursing that patient had relfux type chest pain, burning, no radiation. Feels its \"hard to swallow\".  EKG with T wave inversions in inferior and anteroseptal leads. Troponins elevated [phone]  Differential diagnosis likely NSTEMI.     Plan: ACS pathway   Loaded with aspirin and Plavix -continue with aspirin 81 mg and Plavix 75 mg daily.  Started on metoprolol tartrate 25 mg twice daily.  Started on heparin ACS/low per protocol.  Atorvastatin 80 mg.  Would hold off on nitro given changes in inferior leads- previously hypotensive on administration of nitro  Follow-up on A1c and lipid panel.  Follow-up on echocardiogram  Cardiology consulted  "

## 2024-06-02 NOTE — PROGRESS NOTES
"[facility]  Progress Note  Name: Siena Olmedo I  MRN: [id number]  Unit/Bed#: S -Siobhan I Date of Admission: 6/1/2024   Date of Service: 6/2/2024 I Hospital Day: 0    Assessment & Plan   * Chest pain  Assessment & Plan  PTA reports that she cleaning when she began to experience a throat \"squeezing\" sensation that felt like heart burn. Also noted weakness in arms feeling heavy as well as nausea and 1 episode of emesis and diarrhea. Given 324 mg of aspirin in the ED. Nitroglycerin administered and patient became hypotensive which improved with positioning and IVF.  In the ED, troponins initially were negative, BNP WNL, EKG with T-wave inversion in lead avL, CXR with no acute cardiopulmonary process.  Overnight 6/1/6/2: Notified by nursing that patient had relfux type chest pain, burning, no radiation. Feels its \"hard to swallow\".  EKG with T wave inversions in inferior and anteroseptal leads. Troponins elevated [phone]  Differential diagnosis likely NSTEMI.     Plan: ACS pathway   Loaded with aspirin and Plavix -continue with aspirin 81 mg and Plavix 75 mg daily.  Started on metoprolol tartrate 25 mg twice daily.  Started on heparin ACS/low per protocol.  Atorvastatin 80 mg  Would hold off on nitro given changes in inferior leads- previously hypotensive on administration of nitro  Follow-up on A1c and lipid panel.  Follow-up on echocardiogram  Cardiology consulted    Vertigo  Assessment & Plan  Hx of vertigo     Plan:  Continue with meclizine 25 mg.  Zofran as needed for nausea.    Hypothyroidism  Assessment & Plan  Continue home levothyroxine 50 mcg    Benign hypertension  Assessment & Plan  Hold off on starting PTA lisinopril 10 mg.      Mixed hyperlipidemia  Assessment & Plan  Home Lipitor 40 mg -will increase dose to 80 mg per ACS pathway.           VTE Pharmacologic Prophylaxis: VTE Score: 3 Moderate Risk (Score 3-4) - Pharmacological DVT Prophylaxis Ordered: heparin " drip.    Mobility:   [facility] Achieved: 3: Sit at edge of bed      Patient Centered Rounds: I performed bedside rounds with nursing staff today.   Discussions with Specialists or Other Care Team Provider: Cardiology     Education and Discussions with Family / Patient: Will call spouse   Current Length of Stay: 0 day(s)  Current Patient Status: Observation   Discharge Plan: Anticipate discharge in 24-48 hrs to discharge location to be determined pending rehab evaluations.    Code Status: Level 1 - Full Code    Subjective:   Overnight, patient's troponin continued to be elevated, was started on ACS pathway.  Patient was seen bedside this morning, reports improvement of her symptoms including chest pain and discomfort in her throat.  She does report having a headache.    Objective:     Vitals:   Temp (24hrs), Av.8 °F (36.6 °C), Min:97.4 °F (36.3 °C), Max:98.3 °F (36.8 °C)    Temp:  [97.4 °F (36.3 °C)-98.3 °F (36.8 °C)] 98.3 °F (36.8 °C)  HR:  [67-84] 75  Resp:  [16-20] 17  BP: ()/(47-82) 143/77  SpO2:  [94 %-100 %] 95 %  Body mass index is 26.17 kg/m².     Input and Output Summary (last 24 hours):     Intake/Output Summary (Last 24 hours) at 2024 1037  Last data filed at 2024 1029  Gross per 24 hour   Intake 480 ml   Output 725 ml   Net -245 ml       Physical Exam:   Physical Exam  Constitutional:       General: She is not in acute distress.     Appearance: Normal appearance. She is not ill-appearing.   HENT:      Head: Normocephalic and atraumatic.      Mouth/Throat:      Mouth: Mucous membranes are moist.      Pharynx: Oropharynx is clear.   Eyes:      Extraocular Movements: Extraocular movements intact.      Pupils: Pupils are equal, round, and reactive to light.   Cardiovascular:      Rate and Rhythm: Normal rate and regular rhythm.   Pulmonary:      Effort: Pulmonary effort is normal.      Breath sounds: Normal breath sounds. No rales.   Abdominal:      General: Abdomen is flat.      Palpations:  Abdomen is soft.   Musculoskeletal:      Right lower leg: No edema.      Left lower leg: No edema.   Skin:     General: Skin is warm and dry.      Capillary Refill: Capillary refill takes less than 2 seconds.   Neurological:      General: No focal deficit present.      Mental Status: She is alert and oriented to person, place, and time.          Additional Data:     Labs:  Results from last 7 days   Lab Units 06/02/24  0508 06/01/24  2232 06/01/24  1626   WBC Thousand/uL 11.64*   < > 8.15   HEMOGLOBIN g/dL 11.8   < > 13.3   HEMATOCRIT % 34.6*   < > 39.3   PLATELETS Thousands/uL 247   < > 264   SEGS PCT %  --   --  65   LYMPHO PCT %  --   --  23   MONO PCT %  --   --  9   EOS PCT %  --   --  1    < > = values in this interval not displayed.     Results from last 7 days   Lab Units 06/02/24  0508 06/01/24  1626   SODIUM mmol/L 135 135   POTASSIUM mmol/L 3.2* 3.8   CHLORIDE mmol/L 101 101   CO2 mmol/L 25 24   BUN mg/dL 13 14   CREATININE mg/dL 0.70 0.74   ANION GAP mmol/L 9 10   CALCIUM mg/dL 9.0 9.9   ALBUMIN g/dL  --  4.7   TOTAL BILIRUBIN mg/dL  --  0.68   ALK PHOS U/L  --  53   ALT U/L  --  21   AST U/L  --  22   GLUCOSE RANDOM mg/dL 118 112     Results from last 7 days   Lab Units 06/01/24  2232   INR  1.06     Results from last 7 days   Lab Units 06/01/24  1621   POC GLUCOSE mg/dl 106               Lines/Drains:  Invasive Devices       Peripheral Intravenous Line  Duration             Peripheral IV 06/02/24 Left;Ventral (anterior) Forearm <1 day    Peripheral IV 06/02/24 Right;Ventral (anterior) Forearm <1 day                      Telemetry:  Telemetry Orders (From admission, onward)               24 Hour Telemetry Monitoring  Continuous x 24 Hours (Telem)        Question:  Reason for 24 Hour Telemetry  Answer:  PCI/EP study (including pacer and ICD implementation), Cardiac surgery, MI, abnormal cardiac cath, and chest pain- rule out MI                     Telemetry Reviewed: Reviewed   Indication for Continued  Telemetry Use: Acute MI/Unstable Angina/Rule out ACS             Imaging: No pertinent imaging reviewed.    Recent Cultures (last 7 days):         Last 24 Hours Medication List:   Current Facility-Administered Medications   Medication Dose Route Frequency Provider Last Rate    acetaminophen  975 mg Oral Q6H PRN Leta Cabrera DO      vitamin C  1,000 mg Oral Daily Lily Carrera, DO      aspirin  81 mg Oral Daily Leta Cabrera, DO      atorvastatin  80 mg Oral Daily Talita Petty MD      cholecalciferol  1,000 Units Oral Daily Lily Carrera DO      [START ON 6/3/2024] clopidogrel  75 mg Oral Daily Talita Petty MD      dicyclomine  20 mg Oral 4x Daily PRN Lily Carrera DO      heparin (porcine)  3-20 Units/kg/hr (Order-Specific) Intravenous Titrated Diogenes Garrett MD 12 Units/kg/hr (06/02/24 0743)    heparin (porcine)  1,950 Units Intravenous Q6H PRN Diogenes Garrett MD      heparin (porcine)  3,900 Units Intravenous Q6H PRN Diogenes Garrett MD      levothyroxine  50 mcg Oral Early Morning Lily Carrera, DO      loperamide  2 mg Oral TID PRN Lily Carrera DO      magnesium sulfate  2 g Intravenous Once Leta Cabrera DO 2 g (06/02/24 1022)    meclizine  25 mg Oral Q8H Lily Carrera, DO      metoprolol tartrate  25 mg Oral Q12H [facility] Leta Cabrera DO      ondansetron  4 mg Intravenous Q8H PRN Lily Carrera, DO      pantoprazole  40 mg Intravenous Q12H [facility] Lily Carrera, DO      sucralfate  1 g Oral 4x Daily (AC & HS) Lily Carrera DO      vitamin E  400 Units Oral Daily Lily Carrera DO          Today, Patient Was Seen By: Leta Cabrera DO    **Please Note: This note may have been constructed using a voice recognition system.**

## 2024-06-02 NOTE — ED ATTENDING ATTESTATION
6/1/2024  I, Walter Cloud MD, saw and evaluated the patient. I have discussed the patient with the resident/non-physician practitioner and agree with the resident's/non-physician practitioner's findings, Plan of Care, and MDM as documented in the resident's/non-physician practitioner's note, except where noted. All available labs and Radiology studies were reviewed.  I was present for key portions of any procedure(s) performed by the resident/non-physician practitioner and I was immediately available to provide assistance.       At this point I agree with the current assessment done in the Emergency Department.  I have conducted an independent evaluation of this patient a history and physical is as follows: Patient presents via EMS for evaluation of dizziness and indigestion.  Initial EKG with some ST depressions concerning for ischemia.  Patient denies any chest pain but does describe shortness of breath and indigestion sensation.  She was given aspirin and nitroglycerin which improved her symptoms.  She had brief period of hypotension which resolved with repositioning and IV fluids.  Initial high sensitive troponin 3.  Multiple EKGs with no development of ST elevations that would require MI alert.    Patient was admitted to hospitalist team for further evaluation and management, trending troponins, further treatment.    Results Reviewed       Procedure Component Value Units Date/Time    HS Troponin I 4hr [[id number]]  (Abnormal) Collected: 06/01/24 2127    Lab Status: Final result Specimen: Blood from Arm, Left Updated: 06/01/24 2202     hs TnI 4hr 444 ng/L      Delta 4hr hsTnI 441 ng/L     HS Troponin I 2hr [[id number]]  (Abnormal) Collected: 06/01/24 1827    Lab Status: Final result Specimen: Blood from Arm, Right Updated: 06/01/24 1900     hs TnI 2hr 48 ng/L      Delta 2hr hsTnI 45 ng/L     Platelet count [[id number]]     Lab Status: No result Specimen: Blood     B-Type Natriuretic Peptide(BNP)  [[id number]]  (Normal) Collected: 06/01/24 1627    Lab Status: Final result Specimen: Blood from Arm, Left Updated: 06/01/24 1715     BNP 72 pg/mL     HS Troponin 0hr (reflex protocol) [[id number]]  (Normal) Collected: 06/01/24 1627    Lab Status: Final result Specimen: Blood from Arm, Left Updated: 06/01/24 1705     hs TnI 0hr 3 ng/L     Comprehensive metabolic panel [[id number]] Collected: 06/01/24 1626    Lab Status: Final result Specimen: Blood from Arm, Left Updated: 06/01/24 1704     Sodium 135 mmol/L      Potassium 3.8 mmol/L      Chloride 101 mmol/L      CO2 24 mmol/L      ANION GAP 10 mmol/L      BUN 14 mg/dL      Creatinine 0.74 mg/dL      Glucose 112 mg/dL      Calcium 9.9 mg/dL      AST 22 U/L      ALT 21 U/L      Alkaline Phosphatase 53 U/L      Total Protein 8.1 g/dL      Albumin 4.7 g/dL      Total Bilirubin 0.68 mg/dL      eGFR 86 ml/min/1.73sq m     Narrative:      National Kid[employer] guidelines for Chronic Kidney Disease (CKD):     Stage 1 with normal or high GFR (GFR > 90 mL/min/1.73 square meters)    Stage 2 Mild CKD (GFR = 60-89 mL/min/1.73 square meters)    Stage 3A Moderate CKD (GFR = 45-59 mL/min/1.73 square meters)    Stage 3B Moderate CKD (GFR = 30-44 mL/min/1.73 square meters)    Stage 4 Severe CKD (GFR = 15-29 mL/min/1.73 square meters)    Stage 5 End Stage CKD (GFR <15 mL/min/1.73 square meters)  Note: GFR calculation is accurate only with a steady state creatinine    CBC and differential [[id number]] Collected: 06/01/24 1626    Lab Status: Final result Specimen: Blood from Arm, Left Updated: 06/01/24 1642     WBC 8.15 Thousand/uL      RBC 4.31 Million/uL      Hemoglobin 13.3 g/dL      Hematocrit 39.3 %      MCV 91 fL      MCH 30.9 pg      MCHC 33.8 g/dL      RDW 12.0 %      MPV 9.9 fL      Platelets 264 Thousands/uL      nRBC 0 /100 WBCs      Segmented % 65 %      Immature Grans % 1 %      Lymphocytes % 23 %      Monocytes % 9 %      Eosinophils Relative 1 %      Basophils  Relative 1 %      Absolute Neutrophils 5.35 Thousands/µL      Absolute Immature Grans 0.04 Thousand/uL      Absolute Lymphocytes 1.85 Thousands/µL      Absolute Monocytes 0.76 Thousand/µL      Eosinophils Absolute 0.10 Thousand/µL      Basophils Absolute 0.05 Thousands/µL     Fingerstick Glucose (POCT) [[id number]]  (Normal) Collected: 06/01/24 1621    Lab Status: Final result Specimen: Blood Updated: 06/01/24 1624     POC Glucose 106 mg/dl           XR chest 1 view portable   ED Interpretation by Usama Garcia MD (06/01 1642)   Chest xray independently interpreted by me.  No acute cardiopulmonary disease. No subdiaphragmatic free air. No fracture or dislocation        Final Result by Mirtha Tse MD (06/01 1955)      No acute cardiopulmonary disease.            Workstation performed: [id number]         XR chest portable    (Results Pending)             ED Course         Critical Care Time  Procedures

## 2024-06-02 NOTE — ASSESSMENT & PLAN NOTE
Assessment:  Presented with sudden squeezing sensation that felt like a heartburn as well as weakness in her arms. Associated with nausea and 1 episode of vomiting.  In the ED, 324 mg aspirin.  Was given nitroglycerin but became hypotensive.  Troponin were initially negative but then trended up 726>1684>2074  EKG showed T wave inversion in lead aVL.  Chest x-ray showed no acute findings.  Lipid panel with normal LDL.  A1c 5.8  Differential diagnosis likely NSTEMI.     Plan:   Cardiology is following patient will undergo cardiac cath today.  ACS pathway   Continue with aspirin 81 mg and Plavix 75 mg daily.  Continue metoprolol tartrate 25 mg twice daily.  Started on heparin ACS/low per protocol.  Atorvastatin 80 mg  Would hold off on nitro given changes in inferior leads- previously hypotensive on administration of nitro  Follow-up on echocardiogram

## 2024-06-02 NOTE — ASSESSMENT & PLAN NOTE
Hx of vertigo     Plan:  Continue with meclizine 25 mg.  Zofran as needed for nausea.   27-Feb-2023 08:38

## 2024-06-02 NOTE — DISCHARGE SUMMARY
[facility]  Discharge- Siena Olmedo 1960, 64 y.o. female MRN: [id number]  Unit/Bed#: [facility] 409-Siobhan Encounter: [id number]  Primary Care Provider: Monty Mora MD   Date and time admitted to hospital: 6/1/2024  4:14 PM    * Chest pain  Assessment & Plan  Assessment:  Presented with sudden squeezing sensation that felt like a heartburn as well as weakness in her arms. Associated with nausea and 1 episode of vomiting.  In the ED, 324 mg aspirin.  Was given nitroglycerin but became hypotensive.  Troponin were initially negative but then trended up 726>1684>2074  EKG showed T wave inversion in lead aVL.  Chest x-ray showed no acute findings.  Lipid panel with normal LDL.  A1c 5.8  Echocardiogram showed EF 50%. Low normal systolic function and normal diastolic function. Multiple akinetic and hypokinetic regions. Mild TV regurgitation.    Plan:   S/p PCI with mid RCA AKASH  Continue aspirin and Plavix on discharge.  Follow-up with cardiology outpatient.    Vertigo  Assessment & Plan  History of vertigo     Plan:  Continue with meclizine 25 mg.  Outpatient referral for OT for vestibular therapy.    Hypothyroidism  Assessment & Plan  Continue home levothyroxine 50 mcg    Benign hypertension  Assessment & Plan  Continue home lisinopril 10 mg.      Mixed hyperlipidemia  Assessment & Plan  Home Lipitor 40 mg   Increased to 80 mg per ACS pathway inpatient.       Medical Problems       Resolved Problems  Date Reviewed: 6/1/2024   None       Discharging Resident: Harini Cruz MD  Discharging Attending: Ryan Otoole MD  PCP: Monty Mora MD  Admission Date:   Admission Orders (From admission, onward)       Ordered        06/02/24 1446  INPATIENT ADMISSION  Once            06/01/24 1730  Place in Observation  Once                          Discharge Date: 06/04/24    Consultations During Hospital Stay:  Cardiology     Procedures Performed:   Cardiac catheterization.    Significant Findings /  Test Results:   Long mid RCA lesion-80% culprit lesion. S/p successful PCI with placement of AKASH.    Incidental Findings:   Elevated troponins.  I reviewed the above mentioned incidental findings with the patient and/or family and they expressed understanding.    Test Results Pending at Discharge (will require follow up):  None     Outpatient Tests Requested:  None    Complications: None    Reason for Admission: Chest pain    Hospital Course:   Siena Olmedo is a 64 y.o. female patient with PMHx of HTN, HLD, Vertigo, Hypothyroidism who originally presented to the hospital on 6/1/2024 due to squeezing sensation in the throat with chest burning and tightness which lasted about 3 hours but symptoms continued even at rest. Initial troponins were negative, repeat troponins overnight were as follow 444> 726> 1684> 2074. EKG with T wave inversions in inferior and anteroseptal leads. Patient was loaded with Plavix and aspirin and started on a heparin GGT. Patient was started on metoprolol and Lipitor as well. Cardiology was consulted and proceeded with cardiac catheterization. She was found to have long mid RCA lesion with successful AKASH placement.  Overnight after her cardiac cath, patient had radial site hematoma that was controlled with direct compression. Next day, reported that she still has pain in her right arm around the cath site and also reported dizziness. Discussed with patient the option of outpatient referral for vestibular therapy by OT given that her dizziness is a chronic condition and to continue her meclizine at home and she was agreeable. Also discussed pain control with minimal pain regimen instead of opioids on discharge and she was agreeable. She will follow-up with her primary care provider and cardiology outpatient.    Please see above list of diagnoses and related plan for additional information.     Condition at Discharge: stable    Discharge Day Visit / Exam:   Subjective: Denied any  "chest pain, shortness of breath, or palpitations this morning she did feel mild dizziness in the afternoon while he was walking out of bed with the nurse. Once brought back to the bed her dizziness has improved and discussed with her and her  that she was agreeable on being discharged home for OT outpatient for vestibular therapy.    Vitals: Blood Pressure: 121/60 (06/04/24 1312)  Pulse: 65 (06/04/24 1312)  Temperature: 97.8 °F (36.6 °C) (06/04/24 1125)  Temp Source: Oral (06/03/24 2313)  Respirations: 17 (06/04/24 0729)  Height: 5' 2\" (157.5 cm) (06/03/24 0836)  Weight - Scale: 64.9 kg (143 lb) (06/03/24 0836)  SpO2: 94 % (06/04/24 1312)  Exam:   Physical Exam  Vitals and nursing note reviewed.   Constitutional:       General: She is not in acute distress.     Appearance: She is well-developed.   HENT:      Head: Normocephalic and atraumatic.   Eyes:      Conjunctiva/sclera: Conjunctivae normal.   Cardiovascular:      Rate and Rhythm: Normal rate and regular rhythm.      Heart sounds: No murmur heard.  Pulmonary:      Effort: Pulmonary effort is normal. No respiratory distress.      Breath sounds: Normal breath sounds.   Abdominal:      Palpations: Abdomen is soft.      Tenderness: There is no abdominal tenderness.   Musculoskeletal:         General: No swelling.      Cervical back: Neck supple.      Comments: Swelling and tenderness around radial site cath on the right arm.   Skin:     General: Skin is warm and dry.      Capillary Refill: Capillary refill takes less than 2 seconds.   Neurological:      Mental Status: She is alert.   Psychiatric:         Mood and Affect: Mood normal.        Discussion with Family: Updated  () via phone.    Discharge instructions/Information to patient and family:   See after visit summary for information provided to patient and family.      Provisions for Follow-Up Care:  See after visit summary for information related to follow-up care and any pertinent " home health orders.      Mobility at time of Discharge:   Basic Mobility Inpatient Raw Score: 20  JH-HLM Goal: 6: Walk 10 steps or more  JH-HLM Achieved: 1: Laying in bed  HLM Goal achieved. Continue to encourage appropriate mobility.     Disposition:   Home    Planned Readmission: Not at this time    Discharge Medications:  See after visit summary for reconciled discharge medications provided to patient and/or family.      **Please Note: This note may have been constructed using a voice recognition system**

## 2024-06-02 NOTE — UTILIZATION REVIEW
Initial Clinical Review    Admission: Date/Time/Statement: 6/1/24 1730 observation and CHANGED 6/2/24 1446 INPATIENT DUE TO CONCERN OF NSTEMI WITH INCREASED TROPONIN AND ECG WITH T WAVE INVERSION IN LEAD I AND AVL.   LOADED WITH ASA AND PLAVIX, HEPARIN DRIP STARTED.  CARDIOLOGY FOLLOWING,  CATH TOMORROW.   Admission Orders (From admission, onward)       Ordered        06/02/24 1446  INPATIENT ADMISSION  Once                          Orders Placed This Encounter   Procedures    INPATIENT ADMISSION     Standing Status:   Standing     Number of Occurrences:   1     Order Specific Question:   Level of Care     Answer:   Med Surg [16]     Order Specific Question:   Estimated length of stay     Answer:   More than 2 Midnights     Order Specific Question:   Certification     Answer:   I certify that inpatient services are medically necessary for this patient for a duration of greater than two midnights. See H&P and MD Progress Notes for additional information about the patient's course of treatment.     ED Arrival Information       Expected   -    Arrival   6/1/2024 16:14    Acuity   Emergent              Means of arrival   Ambulance    Escorted by   [facility]    Service   Hospitalist    Admission type   Emergency              Arrival complaint   EMS/Dizziness             Chief Complaint   Patient presents with    Dizziness     Patient arrived via EMS c/o dizziness and chest pain that began approx 1 hr ago.       Initial Presentation: 64 y.o. female  to ED via EMS from home.    Admitted to observation with Dx: Dizziness and suspect GI Related/Vasovagal/Viral Illness  AND CONVERTED TO INPATIENT DUE TO CONCERN NSTEMI.  Presented to ED with chest pain and ongoing vertigo starting about 3 hours prior to arrival while cleaning.  + shortness of breath.  + nausea.  Tried meclizine without improvement.  PMHx:GERD, vertigo, hypothyroid, hpt, hyperlipidemia. On exam: acute distress.  Appears ill and diaphoretic.  pale.  Hs tnl  "3>48/45>441/441.  Ecg non specific ST.  T inverted aVL.   Imaging shows no acute findings on CxR.    ED treatment:  1 liter IVF bolus, ntg sl, ASA, Zofran and Meclizine.    Plan includes telemetry.  Trend troponin.   Continue IVF.  Start PPI, Carafate. Bentyl for cramping.  Imodium for diarrhea.   Continue Meclizine.  Check orthostatics      6/2/24 CHANGED TO INPATIENT:  Concern NSTEMI:    overnight troponin increased and started on Heparin drip.   Today has improved chest and throat discomfort.   Today with headache.  Exam non focal.   Wbc 11.64. K 3.2.   Mg 1.6.  troponin peak 2075=4/1348.    to continue asa and Plavix.   Started on Metoprolol.  Continue heparin drip..  on high intensity statin.   Check echo.  Continue telemetry.  Replete electrolytes     Per Cardiology 6/2/24:  \"Chest pain/elevated troponin concerning for NSTEMI\"   Overnight troponins 444> 726> 1684> 2074.  Started on IV heparin gtt, loaded with Plavix 600 mg x1, Lopressor 25mg BID, Lipitor increased to 80mg QD\"  tomorrow for cardiac cath.   Echo pending.   Continue DAPT with ASA/Plavix, increased statin and beta-blocker.  Avoid ntg due to hypotension.   Continue telemetry.     ED Triage Vitals   Temperature Pulse Respirations Blood Pressure SpO2   06/01/24 1631 06/01/24 1631 06/01/24 1631 06/01/24 1631 06/01/24 1631   97.9 °F (36.6 °C) 74 18 165/74 100 %      Temp Source Heart Rate Source Patient Position - Orthostatic VS BP Location FiO2 (%)   06/01/24 1631 06/01/24 1631 06/01/24 1631 06/01/24 1631 --   Oral Monitor Lying Right arm       Pain Score       06/01/24 1937       No Pain          Wt Readings from Last 1 Encounters:   06/01/24 64.9 kg (143 lb 1.3 oz)     Additional Vital Signs:   06/01/24 23:40:56 -- 84 18 132/75 94 96 % -- --   06/01/24 20:30:54 -- 80 -- 143/76 98 97 % -- Standing for 3 minutes - Orthostatic VS   06/01/24 20:25:18 -- 78 16 135/81 99 97 % -- Standing - Orthostatic VS   06/01/24 20:23:36 -- 70 -- 145/79 101 97 % -- " Sitting - Orthostatic VS   06/01/24 20:22:44 -- 79 -- 140/78 99 96 % -- Lying - Orthostatic VS   06/01/24 2000 -- -- -- -- -- -- None (Room air) --   06/01/24 19:54:36 97.4 °F (36.3 °C) Abnormal  67 16 140/76 97 97 % -- --   06/01/24 1815 -- 73 -- 132/60 86 98 % -- --   06/01/24 1800 -- 68 -- 116/61 82 99 % -- --   06/01/24 1700 -- 69 -- 124/60 85 95 % None (Room air) --   06/01/24 1655 -- 71 -- 119/56 81 96 % -- --   06/01/24 1645 -- 71 -- 86/47 Abnormal   64 Abnormal  96 % --      Pertinent Labs/Diagnostic Test Results:   XR chest portable   Final Result by Mirtha Tse MD (06/02 0555)      No acute cardiopulmonary disease.            Workstation performed: [id number]         XR chest 1 view portable   ED Interpretation by Usama Garcia MD (06/01 1642)   Chest xray independently interpreted by me.  No acute cardiopulmonary disease. No subdiaphragmatic free air. No fracture or dislocation        Final Result by Mirtha Tse MD (06/01 1955)      No acute cardiopulmonary disease.            Workstation performed: [id number]           Date/Time: 6/1/2024 4:19 PM    Indications / Diagnosis:  Cp   ECG reviewed by  the ED Provider: yes    Patient location:  ED   Previous ECG:     Previous ECG:  Compared to current     Comparison ECG info:  1/26/24     Similarity:  Changes noted (New depressions V2, new T wave inversion in   aVL)    Comparison to cardiac monitor: Yes    Interpretation:     Interpretation: non-specific    Rate:     ECG rate:  80     ECG rate assessment: normal    Rhythm:     Rhythm: sinus rhythm    Ectopy:     Ectopy: none    QRS:     QRS axis:  Normal     QRS intervals:  Normal   Conduction:     Conduction: normal    ST segments:     ST segments:  Non-specific     Depression:  V2   T waves:     T waves: inverted      Inverted:  AVL     Date/Time: 6/1/2024 4:45 PM   Indications / Diagnosis:  CP   ECG reviewed by the ED Provider: yes    Patient location:  ED   Previous ECG:     Previous  ECG:  Compared to current     Comparison ECG info:  Today from 1619     Similarity:  No change     Comparison to cardiac monitor: Yes    Interpretation:     Interpretation: non-specific    Rate:     ECG rate:  63     ECG rate assessment: normal    Rhythm:     Rhythm: sinus rhythm    Ectopy:     Ectopy: none    QRS:     QRS axis:  Normal     QRS intervals:  Normal   Conduction:     Conduction: normal    ST segments:     ST segments:  Normal   T waves:     T waves: inverted      Inverted:  AVL   Comments:      This was a posterior ECG.  No elevations or depressions in V7-V9     Date/Time: 6/1/2024 4:55 PM   Indications / Diagnosis:  CP   ECG reviewed by the ED Provider: yes    Patient location:  ED   Previous ECG:     Previous ECG:  Compared to current     Comparison ECG info:  Today from 1619     Similarity:  No change     Comparison to cardiac monitor: Yes    Interpretation:     Interpretation: normal    Rate:     ECG rate:  72     ECG rate assessment: normal    Rhythm:     Rhythm: sinus rhythm    Ectopy:     Ectopy: none    QRS:     QRS axis:  Normal     QRS intervals:  Normal   Conduction:     Conduction: normal    ST segments:     ST segments:  Non-specific     Depression:  V2   T waves:     T waves: inverted      Inverted:  AVL   Results from last 7 days   Lab Units 06/02/24  0508 06/02/24  0037 06/01/24  2232 06/01/24  1626   WBC Thousand/uL 11.64*  --  10.56* 8.15   HEMOGLOBIN g/dL 11.8  --  11.9 13.3   HEMATOCRIT % 34.6*  --  34.2* 39.3   PLATELETS Thousands/uL 247 233 246 264   TOTAL NEUT ABS Thousands/µL  --   --   --  5.35     Results from last 7 days   Lab Units 06/02/24  0508 06/01/24  2356 06/01/24  1626   SODIUM mmol/L 135  --  135   POTASSIUM mmol/L 3.2*  --  3.8   CHLORIDE mmol/L 101  --  101   CO2 mmol/L 25  --  24   ANION GAP mmol/L 9  --  10   BUN mg/dL 13  --  14   CREATININE mg/dL 0.70  --  0.74   EGFR ml/min/1.73sq m 92  --  86   CALCIUM mg/dL 9.0  --  9.9   MAGNESIUM mg/dL 1.6* 1.6*  --     PHOSPHORUS mg/dL  --  2.6  --      Results from last 7 days   Lab Units 06/01/24  1626   AST U/L 22   ALT U/L 21   ALK PHOS U/L 53   TOTAL PROTEIN g/dL 8.1   ALBUMIN g/dL 4.7   TOTAL BILIRUBIN mg/dL 0.68     Results from last 7 days   Lab Units 06/01/24  1621   POC GLUCOSE mg/dl 106     Results from last 7 days   Lab Units 06/02/24  0508 06/01/24  1626   GLUCOSE RANDOM mg/dL 118 112     Results from last 7 days   Lab Units 06/02/24  0229 06/02/24  0037 06/01/24  2235 06/01/24  2127 06/01/24  1827 06/01/24  1627   HS TNI 0HR ng/L  --   --  726*  --   --  3   HS TNI 2HR ng/L  --  1,684*  --   --  48  --    HSTNI D2 ng/L  --  958*  --   --  45*  --    HS TNI 4HR ng/L 2,074*  --   --  444*  --   --    HSTNI D4 ng/L 1,348*  --   --  441*  --   --      Results from last 7 days   Lab Units 06/02/24  1131 06/02/24  0508 06/01/24  2232   PROTIME seconds  --   --  14.5   INR   --   --  1.06   PTT seconds 81* 72* 27     Results from last 7 days   Lab Units 06/01/24  1627   BNP pg/mL 72     Results from last 7 days   Lab Units 06/01/24  2356   LIPASE u/L 17     ED Treatment:   Medication Administration from 06/01/2024 1614 to 06/01/2024 1930         Date/Time Order Dose Route Action Comments     06/01/2024 1706 EDT sodium chloride 0.9 % bolus 1,000 mL 1,000 mL Intravenous New Bag --     06/01/2024 1637 EDT aspirin chewable tablet 324 mg 324 mg Oral Given --     06/01/2024 1637 EDT nitroglycerin (NITROSTAT) SL tablet 0.4 mg 0.4 mg Sublingual Given --     06/01/2024 1656 EDT ondansetron (ZOFRAN) injection 4 mg 4 mg Intravenous Given --     06/01/2024 1704 EDT meclizine (ANTIVERT) tablet 25 mg 25 mg Oral Given --          Past Medical History:   Diagnosis Date    Disease of thyroid gland     High cholesterol     Hypertension     Vertigo      Present on Admission:   Benign hypertension   Hypothyroidism   Vertigo   Mixed hyperlipidemia      Admitting Diagnosis: Vertigo [R42]  Chest pain, unspecified type [R07.9]  Age/Sex: 64 y.o.  female  Admission Orders:  Scheduled Medications:  vitamin C, 1,000 mg, Oral, Daily  atorvastatin, 80 mg, Oral, Daily  cholecalciferol, 1,000 Units, Oral, Daily  [START ON 6/3/2024] clopidogrel, 75 mg, Oral, Daily  levothyroxine, 50 mcg, Oral, Early Morning  lisinopril, 10 mg, Oral, Daily  meclizine, 25 mg, Oral, Q8H  pantoprazole, 40 mg, Intravenous, Q12H RUDDY  sucralfate, 1 g, Oral, 4x Daily (AC & HS)  vitamin E, 400 Units, Oral, Daily    aluminum-magnesium hydroxide-simethicone (MAALOX) oral suspension 30 mL  Dose: 30 mL  Freq: Once Route: PO  Start: 06/01/24 2245 End: 06/01/24 2301    clopidogrel (PLAVIX) tablet 600 mg  Dose: 600 mg  Freq: Once Route: PO  Start: 06/01/24 2330 End: 06/01/24 2348  heparin (porcine) injection 3,900 Units  Dose: 3,900 Units  Freq: Once Route: IV  Start: 06/01/24 2245 End: 06/01/24 2310  magnesium sulfate 2 g/50 mL IVPB (premix) 2 g  Dose: 2 g  Freq: Once Route: IV  Last Dose: 2 g (06/02/24 1022)  Start: 06/02/24 0900 End: 06/02/24 1222  potassium chloride (Klor-Con M20) CR tablet 40 mEq  Dose: 40 mEq  Freq: Once Route: PO  Start: 06/02/24 0900 End: 06/02/24 1022    Continuous IV Infusions:  heparin (porcine), 3-20 Units/kg/hr (Order-Specific), Intravenous, Titrated    multi-electrolyte (PLASMALYTE-A/ISOLYTE-S PH 7.4) IV solution  Rate: 100 mL/hr Dose: 100 mL/hr  Freq: Continuous Route: IV  Indications of Use: IV Hydration  Start: 06/01/24 1815 End: 06/02/24 0414    PRN Meds:  dicyclomine, 20 mg, Oral, 4x Daily PRN  heparin (porcine), 1,950 Units, Intravenous, Q6H PRN  heparin (porcine), 3,900 Units, Intravenous, Q6H PRN  loperamide, 2 mg, Oral, TID PRN  ondansetron, 4 mg, Intravenous, Q8H PRN x 1 6/1    acetaminophen (TYLENOL) tablet 975 mg x 1 6/2  Dose: 975 mg  Freq: Every 6 hours PRN Route: PO  PRN Reasons: mild pain,fever,headaches  Start: 06/02/24 0635    telemetry    IP CONSULT TO CARDIOLOGY    Network Utilization Review Department  ATTENTION: Please call with any questions or  concerns to [phone] and carefully listen to the prompts so that you are directed to the right person. All voicemails are confidential.   For Discharge needs, contact Care Management DC Support Team at [phone] opt. 2  Send all requests for admission clinical reviews, approved or denied determinations and any other requests to dedicated fax number below belonging to the [place] where the patient is receiving treatment. List of dedicated fax numbers for the Facilities:  FACILITY NAME UR FAX NUMBER   ADMISSION DENIALS (Administrative/Medical Necessity) [phone]   DISCHARGE SUPPORT TEAM (NETWORK) [phone]   PARENT CHILD HEALTH (Maternity/NICU/Pediatrics) [phone]   [facility] [phone]   [facility] [phone]   [facility] [phone]   [facility] [phone]   Critical access hospital [phone]   [facility] [phone]   [facility] [phone]   [facility] [phone]   [facility] [phone]   [facility] [phone]   [facility] [phone]   [facility] [phone]

## 2024-06-03 ENCOUNTER — APPOINTMENT (INPATIENT)
Dept: NON INVASIVE DIAGNOSTICS | Facility: HOSPITAL | Age: 64
DRG: 322 | End: 2024-06-03
Payer: COMMERCIAL

## 2024-06-03 LAB
ANION GAP SERPL CALCULATED.3IONS-SCNC: 5 MMOL/L (ref 4–13)
AORTIC ROOT: 2.8 CM
APICAL FOUR CHAMBER EJECTION FRACTION: 60 %
APTT PPP: 75 SECONDS (ref 23–37)
ASCENDING AORTA: 2.8 CM
BSA FOR ECHO PROCEDURE: 1.66 M2
BUN SERPL-MCNC: 10 MG/DL (ref 5–25)
CALCIUM SERPL-MCNC: 8.8 MG/DL (ref 8.4–10.2)
CHLORIDE SERPL-SCNC: 106 MMOL/L (ref 96–108)
CO2 SERPL-SCNC: 28 MMOL/L (ref 21–32)
CREAT SERPL-MCNC: 0.77 MG/DL (ref 0.6–1.3)
E WAVE DECELERATION TIME: 241 MS
E/A RATIO: 1.02
ERYTHROCYTE [DISTWIDTH] IN BLOOD BY AUTOMATED COUNT: 12.6 % (ref 11.6–15.1)
ERYTHROCYTE [DISTWIDTH] IN BLOOD BY AUTOMATED COUNT: 12.6 % (ref 11.6–15.1)
FRACTIONAL SHORTENING: 28 (ref 28–44)
GFR SERPL CREATININE-BSD FRML MDRD: 82 ML/MIN/1.73SQ M
GLUCOSE SERPL-MCNC: 98 MG/DL (ref 65–140)
HCT VFR BLD AUTO: 33.3 % (ref 34.8–46.1)
HCT VFR BLD AUTO: 35.2 % (ref 34.8–46.1)
HGB BLD-MCNC: 11 G/DL (ref 11.5–15.4)
HGB BLD-MCNC: 11.6 G/DL (ref 11.5–15.4)
INTERVENTRICULAR SEPTUM IN DIASTOLE (PARASTERNAL SHORT AXIS VIEW): 0.9 CM
INTERVENTRICULAR SEPTUM: 0.9 CM (ref 0.6–1.1)
KCT BLD-ACNC: 289 SEC (ref 89–137)
LAAS-AP2: 13.2 CM2
LAAS-AP4: 17.2 CM2
LEFT ATRIUM SIZE: 2.6 CM
LEFT ATRIUM VOLUME (MOD BIPLANE): 42 ML
LEFT ATRIUM VOLUME INDEX (MOD BIPLANE): 25.3 ML/M2
LEFT INTERNAL DIMENSION IN SYSTOLE: 3.4 CM (ref 2.1–4)
LEFT VENTRICULAR INTERNAL DIMENSION IN DIASTOLE: 4.7 CM (ref 3.5–6)
LEFT VENTRICULAR POSTERIOR WALL IN END DIASTOLE: 0.8 CM
LEFT VENTRICULAR STROKE VOLUME: 56 ML
LVSV (TEICH): 56 ML
MCH RBC QN AUTO: 30.5 PG (ref 26.8–34.3)
MCH RBC QN AUTO: 31.2 PG (ref 26.8–34.3)
MCHC RBC AUTO-ENTMCNC: 33 G/DL (ref 31.4–37.4)
MCHC RBC AUTO-ENTMCNC: 33 G/DL (ref 31.4–37.4)
MCV RBC AUTO: 93 FL (ref 82–98)
MCV RBC AUTO: 94 FL (ref 82–98)
MV E'TISSUE VEL-SEP: 10 CM/S
MV PEAK A VEL: 0.59 M/S
MV PEAK E VEL: 60 CM/S
MV STENOSIS PRESSURE HALF TIME: 70 MS
MV VALVE AREA P 1/2 METHOD: 3.14
PLATELET # BLD AUTO: 225 THOUSANDS/UL (ref 149–390)
PLATELET # BLD AUTO: 228 THOUSANDS/UL (ref 149–390)
PMV BLD AUTO: 10.1 FL (ref 8.9–12.7)
PMV BLD AUTO: 9.9 FL (ref 8.9–12.7)
POTASSIUM SERPL-SCNC: 3.6 MMOL/L (ref 3.5–5.3)
RA PRESSURE ESTIMATED: 5 MMHG
RBC # BLD AUTO: 3.53 MILLION/UL (ref 3.81–5.12)
RBC # BLD AUTO: 3.8 MILLION/UL (ref 3.81–5.12)
RIGHT ATRIUM AREA SYSTOLE A4C: 14.4 CM2
RIGHT VENTRICLE ID DIMENSION: 4.1 CM
RV PSP: 26 MMHG
SL CV LEFT ATRIUM LENGTH A2C: 4.1 CM
SL CV LV EF: 50
SL CV PED ECHO LEFT VENTRICLE DIASTOLIC VOLUME (MOD BIPLANE) 2D: 103 ML
SL CV PED ECHO LEFT VENTRICLE SYSTOLIC VOLUME (MOD BIPLANE) 2D: 47 ML
SODIUM SERPL-SCNC: 139 MMOL/L (ref 135–147)
SPECIMEN SOURCE: ABNORMAL
TR MAX PG: 21 MMHG
TR PEAK VELOCITY: 2.3 M/S
TRICUSPID ANNULAR PLANE SYSTOLIC EXCURSION: 2 CM
TRICUSPID VALVE PEAK REGURGITATION VELOCITY: 2.3 M/S
WBC # BLD AUTO: 8.41 THOUSAND/UL (ref 4.31–10.16)
WBC # BLD AUTO: 9.05 THOUSAND/UL (ref 4.31–10.16)

## 2024-06-03 PROCEDURE — C1725 CATH, TRANSLUMIN NON-LASER: HCPCS | Performed by: INTERNAL MEDICINE

## 2024-06-03 PROCEDURE — 99152 MOD SED SAME PHYS/QHP 5/>YRS: CPT | Performed by: INTERNAL MEDICINE

## 2024-06-03 PROCEDURE — 99153 MOD SED SAME PHYS/QHP EA: CPT | Performed by: INTERNAL MEDICINE

## 2024-06-03 PROCEDURE — 92928 PRQ TCAT PLMT NTRAC ST 1 LES: CPT | Performed by: INTERNAL MEDICINE

## 2024-06-03 PROCEDURE — C1769 GUIDE WIRE: HCPCS | Performed by: INTERNAL MEDICINE

## 2024-06-03 PROCEDURE — B2111ZZ FLUOROSCOPY OF MULTIPLE CORONARY ARTERIES USING LOW OSMOLAR CONTRAST: ICD-10-PCS | Performed by: INTERNAL MEDICINE

## 2024-06-03 PROCEDURE — 93306 TTE W/DOPPLER COMPLETE: CPT

## 2024-06-03 PROCEDURE — 85347 COAGULATION TIME ACTIVATED: CPT

## 2024-06-03 PROCEDURE — 99232 SBSQ HOSP IP/OBS MODERATE 35: CPT | Performed by: INTERNAL MEDICINE

## 2024-06-03 PROCEDURE — 93454 CORONARY ARTERY ANGIO S&I: CPT | Performed by: INTERNAL MEDICINE

## 2024-06-03 PROCEDURE — C9600 PERC DRUG-EL COR STENT SING: HCPCS | Performed by: INTERNAL MEDICINE

## 2024-06-03 PROCEDURE — C1887 CATHETER, GUIDING: HCPCS | Performed by: INTERNAL MEDICINE

## 2024-06-03 PROCEDURE — 027034Z DILATION OF CORONARY ARTERY, ONE ARTERY WITH DRUG-ELUTING INTRALUMINAL DEVICE, PERCUTANEOUS APPROACH: ICD-10-PCS | Performed by: INTERNAL MEDICINE

## 2024-06-03 PROCEDURE — 93306 TTE W/DOPPLER COMPLETE: CPT | Performed by: INTERNAL MEDICINE

## 2024-06-03 PROCEDURE — C1894 INTRO/SHEATH, NON-LASER: HCPCS | Performed by: INTERNAL MEDICINE

## 2024-06-03 PROCEDURE — C1874 STENT, COATED/COV W/DEL SYS: HCPCS | Performed by: INTERNAL MEDICINE

## 2024-06-03 PROCEDURE — C9113 INJ PANTOPRAZOLE SODIUM, VIA: HCPCS

## 2024-06-03 PROCEDURE — C1760 CLOSURE DEV, VASC: HCPCS | Performed by: INTERNAL MEDICINE

## 2024-06-03 PROCEDURE — 85730 THROMBOPLASTIN TIME PARTIAL: CPT | Performed by: INTERNAL MEDICINE

## 2024-06-03 PROCEDURE — 85027 COMPLETE CBC AUTOMATED: CPT | Performed by: INTERNAL MEDICINE

## 2024-06-03 PROCEDURE — 85027 COMPLETE CBC AUTOMATED: CPT

## 2024-06-03 PROCEDURE — NC001 PR NO CHARGE: Performed by: INTERNAL MEDICINE

## 2024-06-03 PROCEDURE — 80048 BASIC METABOLIC PNL TOTAL CA: CPT

## 2024-06-03 DEVICE — ANGIO-SEAL VIP VASCULAR CLOSURE DEVICE
Type: IMPLANTABLE DEVICE | Status: FUNCTIONAL
Brand: ANGIO-SEAL

## 2024-06-03 DEVICE — STENT ONYXNG25038UX ONYX 2.50X38RX
Type: IMPLANTABLE DEVICE | Status: FUNCTIONAL
Brand: ONYX FRONTIER™

## 2024-06-03 RX ORDER — SODIUM CHLORIDE 9 MG/ML
100 INJECTION, SOLUTION INTRAVENOUS CONTINUOUS
Status: DISPENSED | OUTPATIENT
Start: 2024-06-03 | End: 2024-06-03

## 2024-06-03 RX ORDER — HYDROMORPHONE HCL/PF 1 MG/ML
0.5 SYRINGE (ML) INJECTION EVERY 4 HOURS PRN
Status: DISCONTINUED | OUTPATIENT
Start: 2024-06-03 | End: 2024-06-04

## 2024-06-03 RX ORDER — SODIUM CHLORIDE 9 MG/ML
100 INJECTION, SOLUTION INTRAVENOUS CONTINUOUS
OUTPATIENT
Start: 2024-06-03 | End: 2024-06-04

## 2024-06-03 RX ORDER — FENTANYL CITRATE 50 UG/ML
INJECTION, SOLUTION INTRAMUSCULAR; INTRAVENOUS CODE/TRAUMA/SEDATION MEDICATION
Status: DISCONTINUED | OUTPATIENT
Start: 2024-06-03 | End: 2024-06-03 | Stop reason: HOSPADM

## 2024-06-03 RX ORDER — NITROGLYCERIN 20 MG/100ML
INJECTION INTRAVENOUS CODE/TRAUMA/SEDATION MEDICATION
Status: DISCONTINUED | OUTPATIENT
Start: 2024-06-03 | End: 2024-06-03 | Stop reason: HOSPADM

## 2024-06-03 RX ORDER — MIDAZOLAM HYDROCHLORIDE 2 MG/2ML
INJECTION, SOLUTION INTRAMUSCULAR; INTRAVENOUS CODE/TRAUMA/SEDATION MEDICATION
Status: DISCONTINUED | OUTPATIENT
Start: 2024-06-03 | End: 2024-06-03 | Stop reason: HOSPADM

## 2024-06-03 RX ORDER — LIDOCAINE HYDROCHLORIDE 10 MG/ML
INJECTION, SOLUTION EPIDURAL; INFILTRATION; INTRACAUDAL; PERINEURAL CODE/TRAUMA/SEDATION MEDICATION
Status: DISCONTINUED | OUTPATIENT
Start: 2024-06-03 | End: 2024-06-03 | Stop reason: HOSPADM

## 2024-06-03 RX ORDER — VERAPAMIL HYDROCHLORIDE 2.5 MG/ML
INJECTION, SOLUTION INTRAVENOUS CODE/TRAUMA/SEDATION MEDICATION
Status: DISCONTINUED | OUTPATIENT
Start: 2024-06-03 | End: 2024-06-03 | Stop reason: HOSPADM

## 2024-06-03 RX ORDER — HEPARIN SODIUM 1000 [USP'U]/ML
INJECTION, SOLUTION INTRAVENOUS; SUBCUTANEOUS CODE/TRAUMA/SEDATION MEDICATION
Status: DISCONTINUED | OUTPATIENT
Start: 2024-06-03 | End: 2024-06-03 | Stop reason: HOSPADM

## 2024-06-03 RX ADMIN — MECLIZINE HYDROCHLORIDE 25 MG: 25 TABLET ORAL at 05:29

## 2024-06-03 RX ADMIN — CLOPIDOGREL BISULFATE 75 MG: 75 TABLET ORAL at 08:57

## 2024-06-03 RX ADMIN — METOPROLOL TARTRATE 25 MG: 25 TABLET, FILM COATED ORAL at 08:57

## 2024-06-03 RX ADMIN — ASPIRIN 81 MG CHEWABLE TABLET 81 MG: 81 TABLET CHEWABLE at 08:57

## 2024-06-03 RX ADMIN — ATORVASTATIN CALCIUM 80 MG: 40 TABLET, FILM COATED ORAL at 08:56

## 2024-06-03 RX ADMIN — MECLIZINE HYDROCHLORIDE 25 MG: 25 TABLET ORAL at 12:11

## 2024-06-03 RX ADMIN — OXYCODONE HYDROCHLORIDE AND ACETAMINOPHEN 1000 MG: 500 TABLET ORAL at 08:56

## 2024-06-03 RX ADMIN — PANTOPRAZOLE SODIUM 40 MG: 40 INJECTION, POWDER, FOR SOLUTION INTRAVENOUS at 08:56

## 2024-06-03 RX ADMIN — LEVOTHYROXINE SODIUM 50 MCG: 50 TABLET ORAL at 05:29

## 2024-06-03 RX ADMIN — Medication 400 UNITS: at 08:56

## 2024-06-03 RX ADMIN — Medication 1000 UNITS: at 08:57

## 2024-06-03 RX ADMIN — SODIUM CHLORIDE 100 ML/HR: 0.9 INJECTION, SOLUTION INTRAVENOUS at 11:41

## 2024-06-03 NOTE — UTILIZATION REVIEW
Continued Stay Review    Date: 6/3                          Current Patient Class: IP   Current Level of Care: tele     HPI:64 y.o. female initially admitted on  6/2    Assessment/Plan:     6/3 Cardiology Note   CP , elevated trop , likely NSTEMI . On asa, plavix , statin , lopressor , IV heparin . HTN OP BP regimen . HLD lipid panel , statin  .entative plan for LHC procedure today for definitive ischemic evaluation. Maintain n.p.o. status, IV fluids for hydration. Continue IV heparin GTT ACS low protocol. Tele maintaining SB , dec lopressor from 25 to 12.5 qd . Hold lisinopril . Inc statin .     Vital Signs:   06/03/24 0836 -- 62 -- 131/78 -- -- -- --   06/03/24 07:54:05 98.2 °F (36.8 °C) 67 18 131/78 96 94 % -- --       Pertinent Labs/Diagnostic Results:   6/3 Echo   Left Ventricle: Left ventricular cavity size is normal. Wall thickness is normal. The left ventricular ejection fraction is 50%. Systolic function is low normal. Diastolic function is normal.    The following segments are akinetic: mid inferior and apical inferior.    The following segments are hypokinetic: basal inferoseptal, basal inferior, basal inferolateral and mid inferoseptal.    All other segments are normal.    Tricuspid Valve: There is mild regurgitation.     6/3 Cardiac Cath - pending      Results from last 7 days   Lab Units 06/03/24  0454 06/02/24  0508 06/02/24  0037 06/01/24  2232 06/01/24  1626   WBC Thousand/uL 9.05 11.64*  --  10.56* 8.15   HEMOGLOBIN g/dL 11.6 11.8  --  11.9 13.3   HEMATOCRIT % 35.2 34.6*  --  34.2* 39.3   PLATELETS Thousands/uL 228 247 233 246 264   TOTAL NEUT ABS Thousands/µL  --   --   --   --  5.35         Results from last 7 days   Lab Units 06/03/24  0454 06/02/24  0508 06/01/24  2356 06/01/24  1626   SODIUM mmol/L 139 135  --  135   POTASSIUM mmol/L 3.6 3.2*  --  3.8   CHLORIDE mmol/L 106 101  --  101   CO2 mmol/L 28 25  --  24   ANION GAP mmol/L 5 9  --  10   BUN mg/dL 10 13  --  14   CREATININE mg/dL 0.77  0.70  --  0.74   EGFR ml/min/1.73sq m 82 92  --  86   CALCIUM mg/dL 8.8 9.0  --  9.9   MAGNESIUM mg/dL  --  1.6* 1.6*  --    PHOSPHORUS mg/dL  --   --  2.6  --      Results from last 7 days   Lab Units 06/01/24  1626   AST U/L 22   ALT U/L 21   ALK PHOS U/L 53   TOTAL PROTEIN g/dL 8.1   ALBUMIN g/dL 4.7   TOTAL BILIRUBIN mg/dL 0.68     Results from last 7 days   Lab Units 06/01/24  1621   POC GLUCOSE mg/dl 106     Results from last 7 days   Lab Units 06/03/24  0454 06/02/24  0508 06/01/24  1626   GLUCOSE RANDOM mg/dL 98 118 112         Results from last 7 days   Lab Units 06/02/24  0508   HEMOGLOBIN A1C % 5.8*   EAG mg/dl 120     Results from last 7 days   Lab Units 06/02/24  0229 06/02/24  0037 06/01/24  2235 06/01/24  2127 06/01/24  1827 06/01/24  1627   HS TNI 0HR ng/L  --   --  726*  --   --  3   HS TNI 2HR ng/L  --  1,684*  --   --  48  --    HSTNI D2 ng/L  --  958*  --   --  45*  --    HS TNI 4HR ng/L 2,074*  --   --  444*  --   --    HSTNI D4 ng/L 1,348*  --   --  441*  --   --          Results from last 7 days   Lab Units 06/03/24  0454 06/02/24  1818 06/02/24  1131 06/02/24  0508 06/01/24  2232   PROTIME seconds  --   --   --   --  14.5   INR   --   --   --   --  1.06   PTT seconds 75* 60* 81*   < > 27    < > = values in this interval not displayed.     Results from last 7 days   Lab Units 06/01/24  1627   BNP pg/mL 72     Results from last 7 days   Lab Units 06/01/24  2356   LIPASE u/L 17       Medications:   Scheduled Medications:  vitamin C, 1,000 mg, Oral, Daily  aspirin, 81 mg, Oral, Daily  atorvastatin, 80 mg, Oral, Daily  cholecalciferol, 1,000 Units, Oral, Daily  clopidogrel, 75 mg, Oral, Daily  levothyroxine, 50 mcg, Oral, Early Morning  meclizine, 25 mg, Oral, Q8H  metoprolol tartrate, 25 mg, Oral, Q12H RUDDY  pantoprazole, 40 mg, Intravenous, Q12H RUDDY  sucralfate, 1 g, Oral, 4x Daily (AC & HS)  vitamin E, 400 Units, Oral, Daily      Continuous IV Infusions:  heparin (porcine), 3-20 Units/kg/hr  (Order-Specific), Intravenous, Titrated      PRN Meds:  acetaminophen, 975 mg, Oral, Q6H PRN  dicyclomine, 20 mg, Oral, 4x Daily PRN  heparin (porcine), 1,950 Units, Intravenous, Q6H PRN  heparin (porcine), 3,900 Units, Intravenous, Q6H PRN  loperamide, 2 mg, Oral, TID PRN  ondansetron, 4 mg, Intravenous, Q8H PRN        Discharge Plan: D     Network Utilization Review Department  ATTENTION: Please call with any questions or concerns to [phone] and carefully listen to the prompts so that you are directed to the right person. All voicemails are confidential.   For Discharge needs, contact Care Management DC Support Team at [phone] opt. 2  Send all requests for admission clinical reviews, approved or denied determinations and any other requests to dedicated fax number below belonging to the [place] where the patient is receiving treatment. List of dedicated fax numbers for the Facilities:  FACILITY NAME UR FAX NUMBER   ADMISSION DENIALS (Administrative/Medical Necessity) [phone]   DISCHARGE SUPPORT TEAM (NETWORK) [phone]   PARENT CHILD HEALTH (Maternity/NICU/Pediatrics) [phone]   [facility] [phone]   [facility] [phone]   [facility] [phone]   [facility] [phone]   [facility] [phone]   [facility] [phone]   [facility] [phone]   [facility] [phone]   [facility] [phone]   [facility] [phone]   [facility] [phone]   [facility] [phone]

## 2024-06-03 NOTE — PLAN OF CARE
Problem: PAIN - ADULT  Goal: Verbalizes/displays adequate comfort level or baseline comfort level  Description: Interventions:  - Encourage patient to monitor pain and request assistance  - Assess pain using appropriate pain scale  - Administer analgesics based on type and severity of pain and evaluate response  - Implement non-pharmacological measures as appropriate and evaluate response  - Consider cultural and social influences on pain and pain management  - Notify physician/advanced practitioner if interventions unsuccessful or patient reports new pain  Outcome: Progressing     Problem: INFECTION - ADULT  Goal: Absence or prevention of progression during hospitalization  Description: INTERVENTIONS:  - Assess and monitor for signs and symptoms of infection  - Monitor lab/diagnostic results  - Monitor all insertion sites, i.e. indwelling lines, tubes, and drains  - Monitor endotracheal if appropriate and nasal secretions for changes in amount and color  - [place] appropriate cooling/warming therapies per order  - Administer medications as ordered  - Instruct and encourage patient and family to use good hand hygiene technique  - Identify and instruct in appropriate isolation precautions for identified infection/condition  Outcome: Progressing     Problem: SAFETY ADULT  Goal: Maintain or return to baseline ADL function  Description: INTERVENTIONS:  -  Assess patient's ability to carry out ADLs; assess patient's baseline for ADL function and identify physical deficits which impact ability to perform ADLs (bathing, care of mouth/teeth, toileting, grooming, dressing, etc.)  - Assess/evaluate cause of self-care deficits   - Assess range of motion  - Assess patient's mobility; develop plan if impaired  - Assess patient's need for assistive devices and provide as appropriate  - Encourage maximum independence but intervene and supervise when necessary  - Involve family in performance of ADLs  - Assess for home care  needs following discharge   - Consider OT consult to assist with ADL evaluation and planning for discharge  - Provide patient education as appropriate  Outcome: Progressing     Problem: DISCHARGE PLANNING  Goal: Discharge to home or other facility with appropriate resources  Description: INTERVENTIONS:  - Identify barriers to discharge w/patient and caregiver  - Arrange for needed discharge resources and transportation as appropriate  - Identify discharge learning needs (meds, wound care, etc.)  - Arrange for interpretive services to assist at discharge as needed  - Refer to Case Management Department for coordinating discharge planning if the patient needs post-hospital services based on physician/advanced practitioner order or complex needs related to functional status, cognitive ability, or social support system  Outcome: Progressing     Problem: HEMATOLOGIC - ADULT  Goal: Maintains hematologic stability  Description: INTERVENTIONS  - Assess for signs and symptoms of bleeding or hemorrhage  - Monitor labs  - Administer supportive blood products/factors as ordered and appropriate  Outcome: Progressing

## 2024-06-03 NOTE — PROGRESS NOTES
[facility]  Progress Note  Name: Siena Olmedo I  MRN: [id number]  Unit/Bed#: S -Siobhan I Date of Admission: 6/1/2024   Date of Service: 6/3/2024 I Hospital Day: 1    Assessment & Plan   * Chest pain  Assessment & Plan  Assessment:  Presented with sudden squeezing sensation that felt like a heartburn as well as weakness in her arms. Associated with nausea and 1 episode of vomiting.  In the ED, 324 mg aspirin.  Was given nitroglycerin but became hypotensive.  Troponin were initially negative but then trended up 726>1684>2074  EKG showed T wave inversion in lead aVL.  Chest x-ray showed no acute findings.  Lipid panel with normal LDL.  A1c 5.8  Differential diagnosis likely NSTEMI.     Plan:   Cardiology is following patient will undergo cardiac cath today.  ACS pathway   Continue with aspirin 81 mg and Plavix 75 mg daily.  Continue metoprolol tartrate 25 mg twice daily.  Started on heparin ACS/low per protocol.  Atorvastatin 80 mg  Would hold off on nitro given changes in inferior leads- previously hypotensive on administration of nitro  Follow-up on echocardiogram    Vertigo  Assessment & Plan  History of vertigo     Plan:  Continue with meclizine 25 mg.  Zofran as needed for nausea.    Hypothyroidism  Assessment & Plan  Continue home levothyroxine 50 mcg    Benign hypertension  Assessment & Plan  Hold off on starting PTA lisinopril 10 mg.      Mixed hyperlipidemia  Assessment & Plan  Home Lipitor 40 mg   Increased to 80 mg per ACS pathway inpatient.          VTE Pharmacologic Prophylaxis: VTE Score: 3 Moderate Risk (Score 3-4) - Pharmacological DVT Prophylaxis Ordered: heparin drip.    Mobility:   Basic Mobility Inpatient Raw Score: 23  JH-HLM Goal: 7: Walk 25 feet or more  JH-HLM Achieved: 8: Walk 250 feet ot more  JH-HLM Goal achieved. Continue to encourage appropriate mobility.    Patient Centered Rounds: I performed bedside rounds with nursing staff today.  Discussions with  Specialists or Other Care Team Provider: Cardiology    Education and Discussions with Family / Patient: Updated  () at bedside.    Current Length of Stay: 1 day(s)  Current Patient Status: Inpatient   Discharge Plan: Anticipate discharge tomorrow to home.    Code Status: Level 1 - Full Code    Subjective:   She denied any chest pain, palpitations, or shortness of breath. Denied sore throat. She feels improved and asymptomatic.    Objective:   Patient was seen and examined this morning.  She was laying comfortably in bed not in distress.    Vitals:   Temp (24hrs), Av °F (36.7 °C), Min:97.8 °F (36.6 °C), Max:98.2 °F (36.8 °C)    Temp:  [97.8 °F (36.6 °C)-98.2 °F (36.8 °C)] 98.2 °F (36.8 °C)  HR:  [60-68] 62  Resp:  [15-18] 18  BP: (124-136)/(74-78) 131/78  SpO2:  [94 %-95 %] 94 %  Body mass index is 26.16 kg/m².     Input and Output Summary (last 24 hours):     Intake/Output Summary (Last 24 hours) at 6/3/2024 1457  Last data filed at 2024 2257  Gross per 24 hour   Intake --   Output 825 ml   Net -825 ml       Physical Exam:   Physical Exam  Vitals and nursing note reviewed.   Constitutional:       General: She is not in acute distress.     Appearance: She is well-developed.   HENT:      Head: Normocephalic and atraumatic.   Eyes:      Conjunctiva/sclera: Conjunctivae normal.   Cardiovascular:      Rate and Rhythm: Normal rate and regular rhythm.      Heart sounds: No murmur heard.  Pulmonary:      Effort: Pulmonary effort is normal. No respiratory distress.      Breath sounds: Normal breath sounds.   Abdominal:      Palpations: Abdomen is soft.      Tenderness: There is no abdominal tenderness.   Musculoskeletal:         General: No swelling.      Cervical back: Neck supple.   Skin:     General: Skin is warm and dry.      Capillary Refill: Capillary refill takes less than 2 seconds.   Neurological:      Mental Status: She is alert.   Psychiatric:         Mood and Affect: Mood normal.           Additional Data:     Labs:  Results from last 7 days   Lab Units 06/03/24  0454 06/01/24  2232 06/01/24  1626   WBC Thousand/uL 9.05   < > 8.15   HEMOGLOBIN g/dL 11.6   < > 13.3   HEMATOCRIT % 35.2   < > 39.3   PLATELETS Thousands/uL 228   < > 264   SEGS PCT %  --   --  65   LYMPHO PCT %  --   --  23   MONO PCT %  --   --  9   EOS PCT %  --   --  1    < > = values in this interval not displayed.     Results from last 7 days   Lab Units 06/03/24  0454 06/02/24  0508 06/01/24  1626   SODIUM mmol/L 139   < > 135   POTASSIUM mmol/L 3.6   < > 3.8   CHLORIDE mmol/L 106   < > 101   CO2 mmol/L 28   < > 24   BUN mg/dL 10   < > 14   CREATININE mg/dL 0.77   < > 0.74   ANION GAP mmol/L 5   < > 10   CALCIUM mg/dL 8.8   < > 9.9   ALBUMIN g/dL  --   --  4.7   TOTAL BILIRUBIN mg/dL  --   --  0.68   ALK PHOS U/L  --   --  53   ALT U/L  --   --  21   AST U/L  --   --  22   GLUCOSE RANDOM mg/dL 98   < > 112    < > = values in this interval not displayed.     Results from last 7 days   Lab Units 06/01/24  2232   INR  1.06     Results from last 7 days   Lab Units 06/01/24  1621   POC GLUCOSE mg/dl 106     Results from last 7 days   Lab Units 06/02/24  0508   HEMOGLOBIN A1C % 5.8*           Lines/Drains:  Invasive Devices       Peripheral Intravenous Line  Duration             Peripheral IV 06/02/24 Left;Ventral (anterior) Forearm 1 day    Peripheral IV 06/02/24 Right;Ventral (anterior) Forearm 1 day                      Telemetry:  Telemetry Orders (From admission, onward)               24 Hour Telemetry Monitoring  Continuous x 24 Hours (Telem)        Question:  Reason for 24 Hour Telemetry  Answer:  PCI/EP study (including pacer and ICD implementation), Cardiac surgery, MI, abnormal cardiac cath, and chest pain- rule out MI                     Telemetry Reviewed: Normal Sinus Rhythm  Indication for Continued Telemetry Use: Awaiting PCI/EP Study/CABG             Imaging: Reviewed radiology reports from this admission including:  EKG    Recent Cultures (last 7 days):         Last 24 Hours Medication List:   Current Facility-Administered Medications   Medication Dose Route Frequency Provider Last Rate    acetaminophen  975 mg Oral Q6H PRN Leta Cabrera, DO      vitamin C  1,000 mg Oral Daily Lily Carrera, DO      aspirin  81 mg Oral Daily Basanta Rick, DO      atorvastatin  80 mg Oral Daily Talita Petty MD      cholecalciferol  1,000 Units Oral Daily Lily Carrera, DO      clopidogrel  75 mg Oral Daily Talita Petty MD      dicyclomine  20 mg Oral 4x Daily PRN Lily Carrera, DO      heparin (porcine)  3-20 Units/kg/hr (Order-Specific) Intravenous Titrated Diogenes Garrett MD 12 Units/kg/hr (06/03/24 0719)    heparin (porcine)  1,950 Units Intravenous Q6H PRN Diogenes Garrett MD      heparin (porcine)  3,900 Units Intravenous Q6H PRN Diogenes Garrett MD      levothyroxine  50 mcg Oral Early Morning Lily Carrera, DO      loperamide  2 mg Oral TID PRN Lily Carrera, DO      meclizine  25 mg Oral Q8H Lily Carrera, DO      metoprolol tartrate  25 mg Oral Q12H [facility] Leta Cabrera, DO      ondansetron  4 mg Intravenous Q8H PRN Lily Carrera, DO      pantoprazole  40 mg Intravenous Q12H [facility] Lily Carrera, DO      sodium chloride  100 mL/hr Intravenous Continuous CAM Hassan 100 mL/hr (06/03/24 1141)    sucralfate  1 g Oral 4x Daily (AC & HS) Lily Carrera, DO      vitamin E  400 Units Oral Daily Lily Carrera, DO          Today, Patient Was Seen By: Harini Cruz MD    **Please Note: This note may have been constructed using a voice recognition system.**

## 2024-06-03 NOTE — PLAN OF CARE
Problem: HEMATOLOGIC - ADULT  Goal: Maintains hematologic stability  Description: INTERVENTIONS  - Assess for signs and symptoms of bleeding or hemorrhage  - Monitor labs  - Administer supportive blood products/factors as ordered and appropriate  Outcome: Progressing     Problem: SAFETY ADULT  Goal: Maintain or return to baseline ADL function  Description: INTERVENTIONS:  -  Assess patient's ability to carry out ADLs; assess patient's baseline for ADL function and identify physical deficits which impact ability to perform ADLs (bathing, care of mouth/teeth, toileting, grooming, dressing, etc.)  - Assess/evaluate cause of self-care deficits   - Assess range of motion  - Assess patient's mobility; develop plan if impaired  - Assess patient's need for assistive devices and provide as appropriate  - Encourage maximum independence but intervene and supervise when necessary  - Involve family in performance of ADLs  - Assess for home care needs following discharge   - Consider OT consult to assist with ADL evaluation and planning for discharge  - Provide patient education as appropriate  Outcome: Progressing     Problem: DISCHARGE PLANNING  Goal: Discharge to home or other facility with appropriate resources  Description: INTERVENTIONS:  - Identify barriers to discharge w/patient and caregiver  - Arrange for needed discharge resources and transportation as appropriate  - Identify discharge learning needs (meds, wound care, etc.)  - Arrange for interpretive services to assist at discharge as needed  - Refer to Case Management Department for coordinating discharge planning if the patient needs post-hospital services based on physician/advanced practitioner order or complex needs related to functional status, cognitive ability, or social support system  Outcome: Progressing

## 2024-06-03 NOTE — PROGRESS NOTES
Cardiac cath performed via the RFA. Closed with angioseal. The R radial artery was accessed but the vessel spasmed just prior to starting the PCI.  \  Long Mid RCA lesion - 80 % culprit lesion. Successful PCI with placement of a 2.5 x 38 mm AKASH

## 2024-06-04 VITALS
TEMPERATURE: 97.8 F | RESPIRATION RATE: 17 BRPM | HEIGHT: 62 IN | HEART RATE: 65 BPM | BODY MASS INDEX: 26.31 KG/M2 | SYSTOLIC BLOOD PRESSURE: 121 MMHG | DIASTOLIC BLOOD PRESSURE: 60 MMHG | WEIGHT: 143 LBS | OXYGEN SATURATION: 94 %

## 2024-06-04 LAB
ANION GAP SERPL CALCULATED.3IONS-SCNC: 7 MMOL/L (ref 4–13)
BUN SERPL-MCNC: 12 MG/DL (ref 5–25)
CALCIUM SERPL-MCNC: 8.4 MG/DL (ref 8.4–10.2)
CHLORIDE SERPL-SCNC: 106 MMOL/L (ref 96–108)
CO2 SERPL-SCNC: 25 MMOL/L (ref 21–32)
CREAT SERPL-MCNC: 0.7 MG/DL (ref 0.6–1.3)
ERYTHROCYTE [DISTWIDTH] IN BLOOD BY AUTOMATED COUNT: 12.3 % (ref 11.6–15.1)
GFR SERPL CREATININE-BSD FRML MDRD: 92 ML/MIN/1.73SQ M
GLUCOSE SERPL-MCNC: 115 MG/DL (ref 65–140)
HCT VFR BLD AUTO: 36.9 % (ref 34.8–46.1)
HGB BLD-MCNC: 11.7 G/DL (ref 11.5–15.4)
MCH RBC QN AUTO: 30.9 PG (ref 26.8–34.3)
MCHC RBC AUTO-ENTMCNC: 31.7 G/DL (ref 31.4–37.4)
MCV RBC AUTO: 97 FL (ref 82–98)
PLATELET # BLD AUTO: 232 THOUSANDS/UL (ref 149–390)
PMV BLD AUTO: 10 FL (ref 8.9–12.7)
POTASSIUM SERPL-SCNC: 3.3 MMOL/L (ref 3.5–5.3)
RBC # BLD AUTO: 3.79 MILLION/UL (ref 3.81–5.12)
SODIUM SERPL-SCNC: 138 MMOL/L (ref 135–147)
WBC # BLD AUTO: 10.02 THOUSAND/UL (ref 4.31–10.16)

## 2024-06-04 PROCEDURE — C9113 INJ PANTOPRAZOLE SODIUM, VIA: HCPCS

## 2024-06-04 PROCEDURE — 99239 HOSP IP/OBS DSCHRG MGMT >30: CPT | Performed by: INTERNAL MEDICINE

## 2024-06-04 PROCEDURE — 80048 BASIC METABOLIC PNL TOTAL CA: CPT

## 2024-06-04 PROCEDURE — 85027 COMPLETE CBC AUTOMATED: CPT

## 2024-06-04 PROCEDURE — 99232 SBSQ HOSP IP/OBS MODERATE 35: CPT | Performed by: INTERNAL MEDICINE

## 2024-06-04 RX ORDER — OXYCODONE HYDROCHLORIDE 5 MG/1
5 TABLET ORAL EVERY 6 HOURS PRN
Status: DISCONTINUED | OUTPATIENT
Start: 2024-06-04 | End: 2024-06-04

## 2024-06-04 RX ORDER — HYDROMORPHONE HCL IN WATER/PF 6 MG/30 ML
0.2 PATIENT CONTROLLED ANALGESIA SYRINGE INTRAVENOUS ONCE
Status: COMPLETED | OUTPATIENT
Start: 2024-06-04 | End: 2024-06-04

## 2024-06-04 RX ORDER — ASPIRIN 81 MG/1
81 TABLET, CHEWABLE ORAL DAILY
Qty: 30 TABLET | Refills: 0 | Status: SHIPPED | OUTPATIENT
Start: 2024-06-05 | End: 2024-07-05

## 2024-06-04 RX ORDER — LIDOCAINE 50 MG/G
1 PATCH TOPICAL DAILY
Status: DISCONTINUED | OUTPATIENT
Start: 2024-06-04 | End: 2024-06-04 | Stop reason: HOSPADM

## 2024-06-04 RX ORDER — CLOPIDOGREL BISULFATE 75 MG/1
75 TABLET ORAL DAILY
Qty: 30 TABLET | Refills: 0 | Status: SHIPPED | OUTPATIENT
Start: 2024-06-05 | End: 2024-07-05

## 2024-06-04 RX ORDER — PANTOPRAZOLE SODIUM 40 MG/1
40 TABLET, DELAYED RELEASE ORAL EVERY 12 HOURS SCHEDULED
Status: DISCONTINUED | OUTPATIENT
Start: 2024-06-04 | End: 2024-06-04 | Stop reason: HOSPADM

## 2024-06-04 RX ORDER — ENOXAPARIN SODIUM 100 MG/ML
40 INJECTION SUBCUTANEOUS
Status: DISCONTINUED | OUTPATIENT
Start: 2024-06-04 | End: 2024-06-04 | Stop reason: HOSPADM

## 2024-06-04 RX ORDER — POLYETHYLENE GLYCOL 3350 17 G/17G
17 POWDER, FOR SOLUTION ORAL DAILY PRN
Status: DISCONTINUED | OUTPATIENT
Start: 2024-06-04 | End: 2024-06-04 | Stop reason: HOSPADM

## 2024-06-04 RX ORDER — ATORVASTATIN CALCIUM 80 MG/1
80 TABLET, FILM COATED ORAL DAILY
Qty: 15 TABLET | Refills: 0 | Status: SHIPPED | OUTPATIENT
Start: 2024-06-05 | End: 2024-06-20

## 2024-06-04 RX ORDER — HYDROMORPHONE HCL/PF 1 MG/ML
0.5 SYRINGE (ML) INJECTION EVERY 4 HOURS PRN
Status: DISCONTINUED | OUTPATIENT
Start: 2024-06-04 | End: 2024-06-04 | Stop reason: HOSPADM

## 2024-06-04 RX ORDER — KETOROLAC TROMETHAMINE 30 MG/ML
15 INJECTION, SOLUTION INTRAMUSCULAR; INTRAVENOUS ONCE
Status: COMPLETED | OUTPATIENT
Start: 2024-06-04 | End: 2024-06-04

## 2024-06-04 RX ORDER — POTASSIUM CHLORIDE 20 MEQ/1
40 TABLET, EXTENDED RELEASE ORAL ONCE
Status: COMPLETED | OUTPATIENT
Start: 2024-06-04 | End: 2024-06-04

## 2024-06-04 RX ADMIN — SUCRALFATE 1 G: 1 TABLET ORAL at 05:40

## 2024-06-04 RX ADMIN — MECLIZINE HYDROCHLORIDE 25 MG: 25 TABLET ORAL at 13:17

## 2024-06-04 RX ADMIN — POTASSIUM CHLORIDE 40 MEQ: 1500 TABLET, EXTENDED RELEASE ORAL at 08:42

## 2024-06-04 RX ADMIN — HYDROMORPHONE HYDROCHLORIDE 0.2 MG: 0.2 INJECTION, SOLUTION INTRAMUSCULAR; INTRAVENOUS; SUBCUTANEOUS at 03:12

## 2024-06-04 RX ADMIN — CLOPIDOGREL BISULFATE 75 MG: 75 TABLET ORAL at 08:39

## 2024-06-04 RX ADMIN — SUCRALFATE 1 G: 1 TABLET ORAL at 11:21

## 2024-06-04 RX ADMIN — OXYCODONE HYDROCHLORIDE AND ACETAMINOPHEN 1000 MG: 500 TABLET ORAL at 08:39

## 2024-06-04 RX ADMIN — OXYCODONE HYDROCHLORIDE 5 MG: 5 TABLET ORAL at 08:39

## 2024-06-04 RX ADMIN — KETOROLAC TROMETHAMINE 15 MG: 30 INJECTION, SOLUTION INTRAMUSCULAR; INTRAVENOUS at 03:12

## 2024-06-04 RX ADMIN — ENOXAPARIN SODIUM 40 MG: 40 INJECTION SUBCUTANEOUS at 08:40

## 2024-06-04 RX ADMIN — Medication 400 UNITS: at 08:39

## 2024-06-04 RX ADMIN — METOPROLOL TARTRATE 25 MG: 25 TABLET, FILM COATED ORAL at 08:39

## 2024-06-04 RX ADMIN — ATORVASTATIN CALCIUM 80 MG: 40 TABLET, FILM COATED ORAL at 08:39

## 2024-06-04 RX ADMIN — LIDOCAINE 5% 1 PATCH: 700 PATCH TOPICAL at 08:40

## 2024-06-04 RX ADMIN — LEVOTHYROXINE SODIUM 50 MCG: 50 TABLET ORAL at 05:40

## 2024-06-04 RX ADMIN — Medication 1000 UNITS: at 08:39

## 2024-06-04 RX ADMIN — PANTOPRAZOLE SODIUM 40 MG: 40 INJECTION, POWDER, FOR SOLUTION INTRAVENOUS at 08:39

## 2024-06-04 RX ADMIN — ASPIRIN 81 MG CHEWABLE TABLET 81 MG: 81 TABLET CHEWABLE at 08:39

## 2024-06-04 RX ADMIN — HYDROMORPHONE HYDROCHLORIDE 0.5 MG: 1 INJECTION, SOLUTION INTRAMUSCULAR; INTRAVENOUS; SUBCUTANEOUS at 00:07

## 2024-06-04 RX ADMIN — MECLIZINE HYDROCHLORIDE 25 MG: 25 TABLET ORAL at 05:39

## 2024-06-04 NOTE — ASSESSMENT & PLAN NOTE
History of vertigo     Plan:  Continue with meclizine 25 mg.  Outpatient referral for OT for vestibular therapy.

## 2024-06-04 NOTE — PROGRESS NOTES
"Cardiology Progress Note - Siena Olmedo 64 y.o. female MRN: [id number]    Unit/Bed#: S -01 Encounter: [id number]      Assessment/Recommendations:  1.  Chest pain with elevated troponin: related to type I NSTEMI.  Status post cardiac catheterization revealing significant 80% lesion in mid RCA, status post drug-eluting stent.  Currently chest pain-free.  Continued on aspirin and Plavix along with statin and beta-blocker.  Echocardiogram with wall motion abnormalities and distribution of right coronary artery with relatively preserved ejection fraction.  2.  Hypertension: well-controlled on current regiment, of beta-blocker only.  Lisinopril currently on hold for catheterization, can resume now for blood pressure management.  3.  Hyperlipidemia: Continued on statin.  4.  Stable from cardiac standpoint for discharge home today with outpatient follow-up.    Subjective:   Patient seen and examined.  No significant events overnight.  ; pertinent negatives - chest pain, chest pressure/discomfort, dyspnea, irregular heart beat, lower extremity edema, and palpitations.    Objective:     Vitals: Blood pressure 118/66, pulse 70, temperature 98.1 °F (36.7 °C), resp. rate 17, height 5' 2\" (1.575 m), weight 64.9 kg (143 lb), SpO2 94%., Body mass index is 26.16 kg/m².,   Orthostatic Blood Pressures      Flowsheet Row Most Recent Value   Blood Pressure 118/66 filed at 06/04/2024 0729   Patient Position - Orthostatic VS Lying filed at 06/03/2024 2313              Intake/Output Summary (Last 24 hours) at 6/4/2024 1119  Last data filed at 6/4/2024 0952  Gross per 24 hour   Intake 480 ml   Output 1187 ml   Net -707 ml       TELE: No significant arrhythmias seen.    Physical Exam:    GEN: Siena Olmedo appears well, alert and oriented x 3, pleasant and cooperative   HEENT: pupils equal, round, and reactive to light; extraocular muscles intact  NECK: supple, no carotid bruits   HEART: regular rhythm, normal S1 and " S2, no murmurs, clicks, gallops or rubs   LUNGS: clear to auscultation bilaterally; no wheezes, rales, or rhonchi   ABDOMEN: normal bowel sounds, soft, no tenderness, no distention  EXTREMITIES: peripheral pulses normal; no clubbing, cyanosis, or edema  NEURO: no focal findings   SKIN: normal without suspicious lesions on exposed skin    Medications:      Current Facility-Administered Medications:     acetaminophen (TYLENOL) tablet 975 mg, 975 mg, Oral, Q6H PRN, Talita Petty MD, 975 mg at 06/02/24 0642    ascorbic acid (VITAMIN C) tablet 1,000 mg, 1,000 mg, Oral, Daily, Talita Petty MD, 1,000 mg at 06/04/24 0839    aspirin chewable tablet 81 mg, 81 mg, Oral, Daily, Talita Petty MD, 81 mg at 06/04/24 0839    atorvastatin (LIPITOR) tablet 80 mg, 80 mg, Oral, Daily, Talita Petty MD, 80 mg at 06/04/24 0839    Cholecalciferol (VITAMIN D3) tablet 1,000 Units, 1,000 Units, Oral, Daily, Talita Petty MD, 1,000 Units at 06/04/24 0839    clopidogrel (PLAVIX) tablet 75 mg, 75 mg, Oral, Daily, Talita Petty MD, 75 mg at 06/04/24 0839    dicyclomine (BENTYL) tablet 20 mg, 20 mg, Oral, 4x Daily PRN, Talita Petty MD    enoxaparin (LOVENOX) subcutaneous injection 40 mg, 40 mg, Subcutaneous, Q24H RUDDY, Talita Petty MD, 40 mg at 06/04/24 0840    HYDROmorphone (DILAUDID) injection 0.5 mg, 0.5 mg, Intravenous, Q4H PRN, Talita Petty MD    levothyroxine tablet 50 mcg, 50 mcg, Oral, Early Morning, Talita Petty MD, 50 mcg at 06/04/24 0540    lidocaine (LIDODERM) 5 % patch 1 patch, 1 patch, Topical, Daily, Talita Petty MD, 1 patch at 06/04/24 0840    loperamide (IMODIUM) capsule 2 mg, 2 mg, Oral, TID PRN, Talita Petty MD    meclizine (ANTIVERT) tablet 25 mg, 25 mg, Oral, Q8H, Talita Petty MD, 25 mg at 06/04/24 0539     metoprolol tartrate (LOPRESSOR) tablet 25 mg, 25 mg, Oral, Q12H RUDDY, Talita Petty MD, 25 mg at 06/04/24 0839    ondansetron (ZOFRAN) injection 4 mg, 4 mg, Intravenous, Q8H PRN, Talita Petty MD, 4 mg at 06/01/24 2348    oxyCODONE (ROXICODONE) split tablet 2.5 mg, 2.5 mg, Oral, Q4H PRN **OR** oxyCODONE (ROXICODONE) IR tablet 5 mg, 5 mg, Oral, Q6H PRN, Talita Petty MD, 5 mg at 06/04/24 0839    pantoprazole (PROTONIX) EC tablet 40 mg, 40 mg, Oral, Q12H RUDDY, Lily Carrera DO    polyethylene glycol (MIRALAX) packet 17 g, 17 g, Oral, Daily PRN, Talita Petty MD    sucralfate (CARAFATE) tablet 1 g, 1 g, Oral, 4x Daily (AC & HS), Talita Petty MD, 1 g at 06/04/24 0540    vitamin E (TOCOPHEROL) capsule 400 Units, 400 Units, Oral, Daily, Talita Petty MD, 400 Units at 06/04/24 0839     Labs & Results:        Results from last 7 days   Lab Units 06/04/24  0455 06/03/24  1812 06/03/24  0454   WBC Thousand/uL 10.02 8.41 9.05   HEMOGLOBIN g/dL 11.7 11.0* 11.6   HEMATOCRIT % 36.9 33.3* 35.2   PLATELETS Thousands/uL 232 225 228     Results from last 7 days   Lab Units 06/02/24  0508   TRIGLYCERIDES mg/dL 88   HDL mg/dL 39*     Results from last 7 days   Lab Units 06/04/24  0455 06/03/24  0454 06/02/24  0508 06/01/24  1626   POTASSIUM mmol/L 3.3* 3.6 3.2* 3.8   CHLORIDE mmol/L 106 106 101 101   CO2 mmol/L 25 28 25 24   BUN mg/dL 12 10 13 14   CREATININE mg/dL 0.70 0.77 0.70 0.74   CALCIUM mg/dL 8.4 8.8 9.0 9.9   ALK PHOS U/L  --   --   --  53   ALT U/L  --   --   --  21   AST U/L  --   --   --  22     Results from last 7 days   Lab Units 06/03/24  0454 06/02/24  1818 06/02/24  1131 06/02/24  0508 06/01/24  2232   INR   --   --   --   --  1.06   PTT seconds 75* 60* 81*   < > 27    < > = values in this interval not displayed.     Results from last 7 days   Lab Units 06/02/24  0508 06/01/24  2356   MAGNESIUM mg/dL 1.6* 1.6*       Echo:  personally reviewed -normal LV size with ejection fraction of 50%, low normal.  Normal diastology.  Akinetic mid to apical inferior segments with hypokinetic basal inferior, inferoseptal, inferolateral, and mid inferoseptal walls.  No significant valvular disease.    EKG personally reviewed by Aminah Jones MD.

## 2024-06-04 NOTE — QUICK NOTE
Called to evaluate patient for post cath radial and groin bleed hematoma  VS stable  Patient initially had hemoband which was discontinued, manual pressure applied which contained the radial site bleeding. Pressure bandage is on. She has forearm swelling ,mildly firm, very tender on palpation. Concern for possible forearm hematoma with muscular infiltration  She has good pulses   Touch based with cardiologist on call - Dr Larsen, recommend close monitoring, and continue protocol for post cath hematoma     Plan  Close neuro check Q15min  Monitor BP and VS closely   Apply ice and cold  Elevate forearm

## 2024-06-04 NOTE — PROGRESS NOTES
The pantoprazole has / have been converted to Oral per [facility] IV-to-PO Auto-Conversion Protocol for Adults as approved by the Pharmacy and Therapeutics Committee. The patient met all eligible criteria:  1) Age = 18 years old   2) Received at least one dose of the IV form   3) Receiving at least one other scheduled oral/enteral medication   4) Tolerating an oral/enteral diet   and did not have any exclusions:   1) Critical care patient   2) Active GI bleed (IF assessing H2RAs or PPIs)   3) Continuous tube feeding (IF assessing cipro, doxycycline, levofloxacin, minocycline, rifampin, or voriconazole)   4) Receiving PO vancomycin (IF assessing metronidazole)   5) Persistent nausea and/or vomiting   6) Ileus or gastrointestinal obstruction   7) Danita/nasogastric tube set for continuous suction   8) Specific order not to automatically convert to PO (in the order's comments or if discussed in the most recent Infectious Disease or primary team's progress notes).

## 2024-06-04 NOTE — PLAN OF CARE
Problem: PAIN - ADULT  Goal: Verbalizes/displays adequate comfort level or baseline comfort level  Description: Interventions:  - Encourage patient to monitor pain and request assistance  - Assess pain using appropriate pain scale  - Administer analgesics based on type and severity of pain and evaluate response  - Implement non-pharmacological measures as appropriate and evaluate response  - Consider cultural and social influences on pain and pain management  - Notify physician/advanced practitioner if interventions unsuccessful or patient reports new pain  Outcome: Progressing     Problem: INFECTION - ADULT  Goal: Absence or prevention of progression during hospitalization  Description: INTERVENTIONS:  - Assess and monitor for signs and symptoms of infection  - Monitor lab/diagnostic results  - Monitor all insertion sites, i.e. indwelling lines, tubes, and drains  - Monitor endotracheal if appropriate and nasal secretions for changes in amount and color  - [place] appropriate cooling/warming therapies per order  - Administer medications as ordered  - Instruct and encourage patient and family to use good hand hygiene technique  - Identify and instruct in appropriate isolation precautions for identified infection/condition  Outcome: Progressing     Problem: SAFETY ADULT  Goal: Maintain or return to baseline ADL function  Description: INTERVENTIONS:  -  Assess patient's ability to carry out ADLs; assess patient's baseline for ADL function and identify physical deficits which impact ability to perform ADLs (bathing, care of mouth/teeth, toileting, grooming, dressing, etc.)  - Assess/evaluate cause of self-care deficits   - Assess range of motion  - Assess patient's mobility; develop plan if impaired  - Assess patient's need for assistive devices and provide as appropriate  - Encourage maximum independence but intervene and supervise when necessary  - Involve family in performance of ADLs  - Assess for home care  needs following discharge   - Consider OT consult to assist with ADL evaluation and planning for discharge  - Provide patient education as appropriate  Outcome: Progressing     Problem: DISCHARGE PLANNING  Goal: Discharge to home or other facility with appropriate resources  Description: INTERVENTIONS:  - Identify barriers to discharge w/patient and caregiver  - Arrange for needed discharge resources and transportation as appropriate  - Identify discharge learning needs (meds, wound care, etc.)  - Arrange for interpretive services to assist at discharge as needed  - Refer to Case Management Department for coordinating discharge planning if the patient needs post-hospital services based on physician/advanced practitioner order or complex needs related to functional status, cognitive ability, or social support system  Outcome: Progressing     Problem: HEMATOLOGIC - ADULT  Goal: Maintains hematologic stability  Description: INTERVENTIONS  - Assess for signs and symptoms of bleeding or hemorrhage  - Monitor labs  - Administer supportive blood products/factors as ordered and appropriate  Outcome: Progressing     Problem: Prexisting or High Potential for Compromised Skin Integrity  Goal: Skin integrity is maintained or improved  Description: INTERVENTIONS:  - Identify patients at risk for skin breakdown  - Assess and monitor skin integrity  - Assess and monitor nutrition and hydration status  - Monitor labs   - Assess for incontinence   - Turn and reposition patient  - Assist with mobility/ambulation  - Relieve pressure over bony prominences  - Avoid friction and shearing  - Provide appropriate hygiene as needed including keeping skin clean and dry  - Evaluate need for skin moisturizer/barrier cream  - Collaborate with interdisciplinary team   - Patient/family teaching  - Consider wound care consult   Outcome: Progressing     Problem: Potential for Falls  Goal: Patient will remain free of falls  Description:  INTERVENTIONS:  - Educate patient/family on patient safety including physical limitations  - Instruct patient to call for assistance with activity   - Consult OT/PT to assist with strengthening/mobility   - Keep Call bell within reach  - Keep bed low and locked with side rails adjusted as appropriate  - Keep care items and personal belongings within reach  - Initiate and maintain comfort rounds  - Make Fall Risk Sign visible to staff  - Offer Toileting every 2 Hours, in advance of need  - Initiate/Maintain bed/chair alarm  - Obtain necessary fall risk management equipment  - Apply yellow socks and bracelet for high fall risk patients  - Consider moving patient to room near nurses station  Outcome: Progressing

## 2024-06-04 NOTE — PLAN OF CARE
Problem: PAIN - ADULT  Goal: Verbalizes/displays adequate comfort level or baseline comfort level  Description: Interventions:  - Encourage patient to monitor pain and request assistance  - Assess pain using appropriate pain scale  - Administer analgesics based on type and severity of pain and evaluate response  - Implement non-pharmacological measures as appropriate and evaluate response  - Consider cultural and social influences on pain and pain management  - Notify physician/advanced practitioner if interventions unsuccessful or patient reports new pain  Outcome: Progressing     Problem: INFECTION - ADULT  Goal: Absence or prevention of progression during hospitalization  Description: INTERVENTIONS:  - Assess and monitor for signs and symptoms of infection  - Monitor lab/diagnostic results  - Monitor all insertion sites, i.e. indwelling lines, tubes, and drains  - Monitor endotracheal if appropriate and nasal secretions for changes in amount and color  - [place] appropriate cooling/warming therapies per order  - Administer medications as ordered  - Instruct and encourage patient and family to use good hand hygiene technique  - Identify and instruct in appropriate isolation precautions for identified infection/condition  Outcome: Progressing     Problem: SAFETY ADULT  Goal: Maintain or return to baseline ADL function  Description: INTERVENTIONS:  -  Assess patient's ability to carry out ADLs; assess patient's baseline for ADL function and identify physical deficits which impact ability to perform ADLs (bathing, care of mouth/teeth, toileting, grooming, dressing, etc.)  - Assess/evaluate cause of self-care deficits   - Assess range of motion  - Assess patient's mobility; develop plan if impaired  - Assess patient's need for assistive devices and provide as appropriate  - Encourage maximum independence but intervene and supervise when necessary  - Involve family in performance of ADLs  - Assess for home care  needs following discharge   - Consider OT consult to assist with ADL evaluation and planning for discharge  - Provide patient education as appropriate  Outcome: Progressing     Problem: DISCHARGE PLANNING  Goal: Discharge to home or other facility with appropriate resources  Description: INTERVENTIONS:  - Identify barriers to discharge w/patient and caregiver  - Arrange for needed discharge resources and transportation as appropriate  - Identify discharge learning needs (meds, wound care, etc.)  - Arrange for interpretive services to assist at discharge as needed  - Refer to Case Management Department for coordinating discharge planning if the patient needs post-hospital services based on physician/advanced practitioner order or complex needs related to functional status, cognitive ability, or social support system  Outcome: Progressing     Problem: HEMATOLOGIC - ADULT  Goal: Maintains hematologic stability  Description: INTERVENTIONS  - Assess for signs and symptoms of bleeding or hemorrhage  - Monitor labs  - Administer supportive blood products/factors as ordered and appropriate  Outcome: Progressing

## 2024-06-04 NOTE — DISCHARGE INSTR - AVS FIRST PAGE
Dear Siena Olmedo,     It was our pleasure to care for you here at [facility].  It is our hope that we were always able to exceed the expected standards for your care during your stay.  You were hospitalized due to chest pain.  You were cared for on the forth floor by Harini Cruz MD under the service of Ryan Otoole MD with the [facility] who covers for your primary care physician (PCP), Monty Mora MD, while you were hospitalized.  If you have any questions or concerns related to this hospitalization, you may contact us at .  For follow up as well as any medication refills, we recommend that you follow up with your primary care physician.  A registered nurse will reach out to you by phone within a few days after your discharge to answer any additional questions that you may have after going home.  However, at this time we provide for you here, the most important instructions / recommendations at discharge:     Notable Medication Adjustments -   START taking Plavix 75 mg 1 tablet daily.  START taking aspirin 81 mg 1 tablet daily.  START taking metoprolol Peritrate 25 mg 1 tablet twice daily.  Your atorvastatin was changed from 40 mg 1 tablet daily to 80 mg 1 tablet daily.  Testing Required after Discharge -   None  Important follow up information -   Please follow-up with your primary care provider within 1 week of discharge.  Please follow-up with cardiology outpatient.  Call to make an appointment.  Other Instructions -   Refer below for post cardiac stent instructions.  If you experience chest pain, shortness of breath, or palpitations please return back to the ED.  Please review this entire after visit summary as additional general instructions including medication list, appointments, activity, diet, any pertinent wound care, and other additional recommendations from your care team that may be provided for  you.      Sincerely,     Harini Cruz MD     1. Please see the post cardiac catheterization/ angioseal closure device instructions and stent booklet. No heavy lifting, greater than 10 lbs. or strenuous  activity for 48 hrs.  See the post cardiac catheterization/ angioseal closure device instructions.     2.Remove band aid tomorrow.  Shower and wash area- wrist and groin gently with soap and water- beginning tomorrow. Rinse and pat dry.  Apply new water seal band aid.  Repeat this process for 5 days. No powders, creams lotions or antibiotic ointments  for 5 days.  No tub baths, hot tubs or swimming for 5 days.     3.No driving for 3 days    4. Do not stop aspirin or plavix (clopidigrel) for any reason without a cardiologist’s consent, or the stent could block up and cause a heart attack.

## 2024-06-04 NOTE — ASSESSMENT & PLAN NOTE
Assessment:  Presented with sudden squeezing sensation that felt like a heartburn as well as weakness in her arms. Associated with nausea and 1 episode of vomiting.  In the ED, 324 mg aspirin.  Was given nitroglycerin but became hypotensive.  Troponin were initially negative but then trended up 726>1684>2074  EKG showed T wave inversion in lead aVL.  Chest x-ray showed no acute findings.  Lipid panel with normal LDL.  A1c 5.8  Echocardiogram showed EF 50%. Low normal systolic function and normal diastolic function. Multiple akinetic and hypokinetic regions. Mild TV regurgitation.    Plan:   S/p PCI with mid RCA AKASH  Continue aspirin and Plavix on discharge.  Follow-up with cardiology outpatient.

## 2024-06-04 NOTE — PROGRESS NOTES
Increased bleeding noted at Right radial cath site, hemoband discontinued manual pressure applied for 20 minutes. Patients neurovascular status intact. Hemodynamically stable.Bleeding stopped and pressure dressing applied. Call placed to Dr Larsen and Dr Moore.Dr Moore in agreement with pressure dressing in place and requested patient continue to be monitored. Dr Collins requested to assess patient as per Dr Larsen request.. Dr Collins assessed patient and requested patient to remain in APU  to assess both right wrist and right femoral sites for bleeding and hematoma.

## 2024-06-04 NOTE — PROGRESS NOTES
Feedback given to Dr Collins regarding patient's right wrist and femoral sites, both sites dressings changed and remain clean, dry and intact. Right forearm hematoma noted to be softer to touch in comparison to previous assessment. Dr Collins okay with patient being discharged to floor but requests that sites continue to be monitored and to be notified if there are any changes. Receiving nurse made aware of MD's request and in agreement to notify MD with any changes.   IV discontinued, cath removed intact

## 2024-06-05 NOTE — UTILIZATION REVIEW
NOTIFICATION OF ADMISSION DISCHARGE   This is a Notification of Discharge from [facility]. Please be advised that this patient has been discharge from our facility. Below you will find the admission and discharge date and time including the patient’s disposition.   UTILIZATION REVIEW CONTACT:  Arlene Huynh  Utilization   Network Utilization Review Department  Phone: [phone] carefully listen to the prompts. All voicemails are confidential.  Email: [email or web address]     ADMISSION INFORMATION  PRESENTATION DATE: 6/1/2024  4:14 PM  OBERVATION ADMISSION DATE:   INPATIENT ADMISSION DATE: 6/2/24  2:46 PM   DISCHARGE DATE: 6/4/2024  3:09 PM   DISPOSITION:Home/Self Care    Network Utilization Review Department  ATTENTION: Please call with any questions or concerns to [phone] and carefully listen to the prompts so that you are directed to the right person. All voicemails are confidential.   For Discharge needs, contact Care Management DC Support Team at [phone] opt. [phone]  Send all requests for admission clinical reviews, approved or denied determinations and any other requests to dedicated fax number below belonging to the campus where the patient is receiving treatment. List of dedicated fax numbers for the Facilities:  FACILITY NAME UR FAX NUMBER   ADMISSION DENIALS (Administrative/Medical Necessity) [phone]   DISCHARGE SUPPORT TEAM ([facility]) [phone]   PARENT CHILD HEALTH (Maternity/NICU/Pediatrics) [phone]   [facility] [phone]   [facility] [phone]   [facility] [phone]   [facility] [phone]   [facility] [phone]   [facility] [phone]   [facility] [phone]   [facility] [phone]   [facility]  [facility] [phone]   [facility] [phone]   [facility] [phone]   [facility] [phone]

## 2024-06-11 NOTE — PROGRESS NOTES
Cardiology   Hospital Follow Up   Office Visit Note  Siena Olmedo   64 y.o.   female   MRN: [id number]  [facility]  [address]  [address]  [place] [id number] [phone]    PCP: Monty Mora MD  Cardiologist: Will be Dr. Jones            Summary of Plan:  Heart healthy diet: Mediterranean or DASH  Adherence to DAPT reinforced  Nonfasting BMP today  Pharmacologic nuclear stress test re: residual CAD as recommended by interventional cardiology  Switch metoprolol  tartrate to metoprolol succinate 50 mg daily  Warm compresses 4 times daily to the hematoma, right forearm  Provided work excuse to return to work in 1 week.  If she needs additional time advised to contact me.  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 Jones          Assessment/Plan  CAD, recent non-STEMI ADM 6/1 - 6/4/2024  Underwent PCI/AKASH to the culprit 80% lesion in the mid RCA.  On aspirin 81, Plavix 75 daily, high intensity statin, beta-blocker  Residual: 70% mid LAD, 90% OM1, 80% RPDA--out pt stress test recommended by interventional cardiology  EF 50% with Wall motion abnormalities in the RCA distribution  Hypertension. /64 lisinopril 10 mg daily, plus lisinopril HCTZ 20/12.5 daily--as prior to her admission, metoprolol tartrate 25 mg every 12--> switch to succinate 50 mg daily once complete her current prescription  Hyperlipidemia, mixed.  On atorvastatin 80 mg daily.  Calculated LDL 76.  Repeat lipid profile 4 to 12 weeks   Latest Reference Range & Units 10/19/20 09:50 06/04/21 08:34 09/30/21 10:06 06/02/24 05:08   Cholesterol See Comment mg/dL 256 (H) 260 (H) 146 133   Triglycerides See Comment mg/dL 360 (H) 351 (H) 175 (H) 88   HDL >=50 mg/dL 35 (L) 31 (L) 32 (L) 39 (L)   Non-HDL Cholesterol mg/dl 221 229 114    LDL Calculated 0 - 100 mg/dL 149 (H) 159 (H) 79 76    Hypothyroidisn  Family history premature CAD:  mother with fatal MI, oldest sister with CABG in the her 50s. Youngest sister with heart transplant in her 50s.   Pre diabetes.  hgA1c 5.8  Cardiac testing  [facility] 6/3/24. Cardiac cath performed via the RFA. Closed with angioseal. The R radial artery was accessed but the vessel spasmed just prior to starting the PCI.   Long Prox -  Mid RCA lesion - 80 % culprit lesion--> AKASH    Mid LAD lesion is 70% stenosed.    1st Mrg lesion is 90% stenosed. Very small vessel.    RPDA lesion is 80% stenosed. Very small vessel.   Plan; Cardiac rehab. Outpatient nuclear stress test - The LAD would be amendable to PCI. OM1 and the PDA are very small vessels. Both would be higher risk PCI's.   TTE 6/3/24. EF 50%.   The following segments are akinetic: mid inferior and apical inferior. The following segments are hypokinetic: basal inferoseptal, basal inferior, basal inferolateral and mid inferoseptal.All other segments are normal. Mild TR                  HPI  Siena Olmedo is a 64 y.o.year old female with hypertension, marked dyslipidemia, chronic vertigo on meclizine, hypothyroidism and GERD.  She has family history of premature CAD.  Her mother had a fatal MI.  Her older sister had CABG in her 50s.  Her younger sister underwent heart transplant in her 50s.      She last saw Dr. Chaney in November 2020.  He noted in the past she had episodes of syncope that were rare and infrequent.  Her total cholesterol was 256, calculated  non-, triglycerides 360.  He recommended a Zio patch and if unrevealing, a loop recorder implant, however she had no health insurance and was not interested in pursuing further testing.  She was lost to cardiology follow-up    Denies tobacco, etoh, illicit drug use.       ADM 6/1 - 6/4/2024  CC: Chest burning/tightness while cleaning her house, with associated nausea, diaphoresis  EKG: ST T wave changes suggestive of ischemia, laterally and  inferiorly.    Elevated troponin:444> 726> 1684> 2074   She underwent left heart catheterization which showed multivessel disease.  She underwent PCI with AKASH to culprit to 80% mid RCA.  Otherwise she had an 80% stenosis of the RPDA, very small vessel, 90% stenosis of OM1, very small's muscle and 70% stenosis of the mid LAD.  An outpatient nuclear stress is recommended.  She was placed on DAPT with aspirin and Plavix.  She was started on high intensity statin with atorvastatin 80 mg daily along with beta-blocker.  Her echocardiogram showed an EF of 50% with wall motion abnormalities in the RCA territory.      6/12/24  Hospital follow-up.  Review of systems: She has a significant hematoma of the right forearm.  It is fairly edematous and tender.  She has a good distal pulse.  She denies any chest pain or shortness of breath.  She is adherent to her medical therapy without problems  Today we reviewed her coronary angiogram and her echocardiogram she is aware of the findings  A pharmacological nuclear stress test was recommended by interventional cardiology to assess for residual disease  I encouraged adherence to DAPT  I recommended nonfasting BMP to assess her renal function and electrolytes  We discussed her baseline lipids.  She is tolerating her current therapy.  Her statin was uptitrated.  I gave her slip for reassessment in 4 to 12 weeks.  I encouraged adoption of a heart healthy diet and education was provided.  She received the MI booklet.  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  This will be difficult with her right forearm issues 1 was provided for her today.        Review of Systems   Constitutional: Negative for chills.   Cardiovascular:  Negative for chest pain, claudication, cyanosis, dyspnea on exertion, irregular heartbeat, leg swelling, near-syncope, orthopnea, palpitations, paroxysmal nocturnal dyspnea and syncope.   Respiratory:  Negative  for cough and shortness of breath.    Musculoskeletal:         Hematoma right forearm   Gastrointestinal:  Negative for heartburn and nausea.   Neurological:  Negative for dizziness, focal weakness, headaches, light-headedness and weakness.   All other systems reviewed and are negative.            Assessment  Diagnoses and all orders for this visit:    Hospital discharge follow-up    Coronary artery disease involving native coronary artery of native heart without angina pectoris  -     NM myocardial perfusion spect (rx stress and/or rest); Future  -     Basic metabolic panel; Future  -     CBC and Platelet; Future  -     metoprolol succinate (TOPROL-XL) 50 mg 24 hr tablet; Take 1 tablet (50 mg total) by mouth daily    Benign hypertension  -     NM myocardial perfusion spect (rx stress and/or rest); Future  -     metoprolol succinate (TOPROL-XL) 50 mg 24 hr tablet; Take 1 tablet (50 mg total) by mouth daily    Status post insertion of drug eluting coronary artery stent  -     NM myocardial perfusion spect (rx stress and/or rest); Future  -     Basic metabolic panel; Future    Mixed hyperlipidemia  -     NM myocardial perfusion spect (rx stress and/or rest); Future    Encounter for monitoring antiplatelet therapy    ACS (acute coronary syndrome) (HCC)  -     clopidogrel (PLAVIX) 75 mg tablet; Take 1 tablet (75 mg total) by mouth daily  -     aspirin 81 mg chewable tablet; Chew 1 tablet (81 mg total) daily  -     atorvastatin (LIPITOR) 80 mg tablet; Take 1 tablet (80 mg total) by mouth daily        Past Medical History:   Diagnosis Date    Disease of thyroid gland     High cholesterol     Hypertension     Vertigo        Past Surgical History:   Procedure Laterality Date    CARDIAC CATHETERIZATION N/A 6/3/2024    Procedure: Cardiac pci;  Surgeon: Mele Carlson MD;  [facility]: [facility];  Service: Cardiology    CARDIAC CATHETERIZATION N/A 6/3/2024    Procedure: Cardiac Coronary Angiogram;  Surgeon: Mele  MD Robyn;  [facility];  Service: Cardiology           Allergies  Allergies   Allergen Reactions    Sulfa Antibiotics Rash         Medications    Current Outpatient Medications:     Ascorbic Acid (vitamin C) 1000 MG tablet, Take 1,000 mg by mouth daily, Disp: , Rfl:     aspirin 81 mg chewable tablet, Chew 1 tablet (81 mg total) daily, Disp: 30 tablet, Rfl: 11    atorvastatin (LIPITOR) 80 mg tablet, Take 1 tablet (80 mg total) by mouth daily, Disp: 30 tablet, Rfl: 11    cholecalciferol (VITAMIN D3) 1,000 units tablet, Take 1,000 Units by mouth daily, Disp: , Rfl:     clopidogrel (PLAVIX) 75 mg tablet, Take 1 tablet (75 mg total) by mouth daily, Disp: 30 tablet, Rfl: 11    famotidine (PEPCID) 20 mg tablet, Take 1 tablet (20 mg total) by mouth daily for 5 days, Disp: 5 tablet, Rfl: 0    levothyroxine 50 mcg tablet, Take 50 mcg by mouth daily, Disp: , Rfl:     lidocaine (Lidoderm) 5 %, Apply 1 patch topically over 12 hours daily for 5 days Remove & Discard patch within 12 hours or as directed by MD, Disp: 5 patch, Rfl: 0    lisinopril (ZESTRIL) 10 mg tablet, Take 10 mg by mouth daily, Disp: , Rfl:     lisinopril-hydrochlorothiazide (PRINZIDE,ZESTORETIC) 20-12.5 MG per tablet, Take 1 tablet by mouth daily, Disp: , Rfl:     meclizine (ANTIVERT) 25 mg tablet, Take 25 mg by mouth 3 (three) times a day as needed, Disp: , Rfl:     metoprolol succinate (TOPROL-XL) 50 mg 24 hr tablet, Take 1 tablet (50 mg total) by mouth daily, Disp: 30 tablet, Rfl: 11    Multiple Vitamins-Minerals (multivitamin with minerals) tablet, Take 1 tablet by mouth daily, Disp: , Rfl:     sucralfate (CARAFATE) 1 g tablet, Take 1 tablet (1 g total) by mouth 4 (four) times a day for 7 days, Disp: 28 tablet, Rfl: 0    vitamin E 100 UNIT capsule, Take 100 Units by mouth daily, Disp: , Rfl:       Social History     Socioeconomic History    Marital status: /Civil Union     Spouse name: Not on file    Number of children: Not on  "file    Years of education: Not on file    Highest education level: Not on file   Occupational History    Not on file   Tobacco Use    Smoking status: Never    Smokeless tobacco: Never   Vaping Use    Vaping status: Not on file   Substance and Sexual Activity    Alcohol use: Not Currently    Drug use: Not Currently    Sexual activity: Not on file   Other Topics Concern    Not on file   Social History Narrative    Not on file     Social Determinants of Health     Financial Resource Strain: Not on file   Food Insecurity: Not on file   Transportation Needs: Not on file   Physical Activity: Not on file   Stress: Not on file   Social Connections: Not on file   Intimate Partner Violence: Not on file   Housing Stability: Not on file       History reviewed. No pertinent family history.    Physical Exam  Vitals and nursing note reviewed.   Constitutional:       General: She is not in acute distress.     Appearance: She is not diaphoretic.   HENT:      Head: Normocephalic and atraumatic.   Eyes:      Conjunctiva/sclera: Conjunctivae normal.   Cardiovascular:      Rate and Rhythm: Normal rate and regular rhythm.      Pulses: Intact distal pulses.      Heart sounds: Normal heart sounds.   Pulmonary:      Effort: Pulmonary effort is normal.      Breath sounds: Normal breath sounds.   Abdominal:      General: Bowel sounds are normal.      Palpations: Abdomen is soft.   Musculoskeletal:         General: Normal range of motion.      Cervical back: Normal range of motion and neck supple.      Comments: Significant edema and ecchymosis noted about the volar aspect of the right forearm.  Radial pulse is +2   Skin:     General: Skin is warm and dry.   Neurological:      Mental Status: She is alert and oriented to person, place, and time.         Vitals: Blood pressure 110/64, pulse 57, height 5' 2\" (1.575 m), weight 61.2 kg (135 lb), SpO2 97%.   Wt Readings from Last 3 Encounters:   06/12/24 61.2 kg (135 lb)   06/03/24 64.9 kg (143 lb) " "  02/07/24 61.7 kg (136 lb)           Labs & Results:  Lab Results   Component Value Date    WBC 10.02 06/04/2024    HGB 11.7 06/04/2024    HCT 36.9 06/04/2024    MCV 97 06/04/2024     06/04/2024     BNP   Date Value Ref Range Status   06/01/2024 72 0 - 100 pg/mL Final     No components found for: \"CHEM\"  No results found for: \"CKTOTAL\", \"TROPONINI\", \"TROPONINT\", \"CKMBINDEX\"  No results found for this or any previous visit.    No results found for this or any previous visit.    No valid procedures specified.  No results found for this or any previous visit.                    This note was completed in part utilizing [employer] direct voice recognition software.   Grammatical errors, random word insertion, spelling mistakes, and incomplete sentences may be an occasional consequence of the system secondary to software limitations, ambient noise and hardware issues. At the time of dictation, efforts were made to edit, clarify and /or correct errors.  Please read the chart carefully and recognize, using context, where substitutions have occurred.  If you have any questions or concerns about the context, text or information contained within the body of this dictation, please contact myself, the provider, for further clarification      "

## 2024-06-12 ENCOUNTER — APPOINTMENT (OUTPATIENT)
Dept: LAB | Facility: CLINIC | Age: 64
End: 2024-06-12
Payer: COMMERCIAL

## 2024-06-12 ENCOUNTER — OFFICE VISIT (OUTPATIENT)
Dept: CARDIOLOGY CLINIC | Facility: CLINIC | Age: 64
End: 2024-06-12
Payer: COMMERCIAL

## 2024-06-12 ENCOUNTER — TELEPHONE (OUTPATIENT)
Dept: CARDIOLOGY CLINIC | Facility: CLINIC | Age: 64
End: 2024-06-12

## 2024-06-12 VITALS
DIASTOLIC BLOOD PRESSURE: 64 MMHG | HEIGHT: 62 IN | OXYGEN SATURATION: 97 % | SYSTOLIC BLOOD PRESSURE: 110 MMHG | HEART RATE: 57 BPM | BODY MASS INDEX: 24.84 KG/M2 | WEIGHT: 135 LBS

## 2024-06-12 DIAGNOSIS — Z79.02 ENCOUNTER FOR MONITORING ANTIPLATELET THERAPY: ICD-10-CM

## 2024-06-12 DIAGNOSIS — I25.10 CORONARY ARTERY DISEASE INVOLVING NATIVE CORONARY ARTERY OF NATIVE HEART WITHOUT ANGINA PECTORIS: ICD-10-CM

## 2024-06-12 DIAGNOSIS — E78.2 MIXED HYPERLIPIDEMIA: ICD-10-CM

## 2024-06-12 DIAGNOSIS — Z95.5 STATUS POST INSERTION OF DRUG ELUTING CORONARY ARTERY STENT: ICD-10-CM

## 2024-06-12 DIAGNOSIS — I24.9 ACS (ACUTE CORONARY SYNDROME) (HCC): ICD-10-CM

## 2024-06-12 DIAGNOSIS — I10 BENIGN HYPERTENSION: ICD-10-CM

## 2024-06-12 DIAGNOSIS — Z51.81 ENCOUNTER FOR MONITORING ANTIPLATELET THERAPY: ICD-10-CM

## 2024-06-12 DIAGNOSIS — Z09 HOSPITAL DISCHARGE FOLLOW-UP: Primary | ICD-10-CM

## 2024-06-12 LAB
ANION GAP SERPL CALCULATED.3IONS-SCNC: 8 MMOL/L (ref 4–13)
BUN SERPL-MCNC: 12 MG/DL (ref 5–25)
CALCIUM SERPL-MCNC: 10.2 MG/DL (ref 8.4–10.2)
CHLORIDE SERPL-SCNC: 101 MMOL/L (ref 96–108)
CO2 SERPL-SCNC: 29 MMOL/L (ref 21–32)
CREAT SERPL-MCNC: 0.62 MG/DL (ref 0.6–1.3)
ERYTHROCYTE [DISTWIDTH] IN BLOOD BY AUTOMATED COUNT: 11.9 % (ref 11.6–15.1)
GFR SERPL CREATININE-BSD FRML MDRD: 96 ML/MIN/1.73SQ M
GLUCOSE SERPL-MCNC: 123 MG/DL (ref 65–140)
HCT VFR BLD AUTO: 37.6 % (ref 34.8–46.1)
HGB BLD-MCNC: 12.7 G/DL (ref 11.5–15.4)
MCH RBC QN AUTO: 31.2 PG (ref 26.8–34.3)
MCHC RBC AUTO-ENTMCNC: 33.8 G/DL (ref 31.4–37.4)
MCV RBC AUTO: 92 FL (ref 82–98)
PLATELET # BLD AUTO: 395 THOUSANDS/UL (ref 149–390)
PMV BLD AUTO: 9.8 FL (ref 8.9–12.7)
POTASSIUM SERPL-SCNC: 3.5 MMOL/L (ref 3.5–5.3)
RBC # BLD AUTO: 4.07 MILLION/UL (ref 3.81–5.12)
SODIUM SERPL-SCNC: 138 MMOL/L (ref 135–147)
WBC # BLD AUTO: 7.55 THOUSAND/UL (ref 4.31–10.16)

## 2024-06-12 PROCEDURE — 85027 COMPLETE CBC AUTOMATED: CPT

## 2024-06-12 PROCEDURE — 99215 OFFICE O/P EST HI 40 MIN: CPT | Performed by: NURSE PRACTITIONER

## 2024-06-12 PROCEDURE — 36415 COLL VENOUS BLD VENIPUNCTURE: CPT

## 2024-06-12 PROCEDURE — 80048 BASIC METABOLIC PNL TOTAL CA: CPT

## 2024-06-12 RX ORDER — METOPROLOL SUCCINATE 50 MG/1
50 TABLET, EXTENDED RELEASE ORAL DAILY
Qty: 30 TABLET | Refills: 11 | Status: SHIPPED | OUTPATIENT
Start: 2024-06-12

## 2024-06-12 RX ORDER — ATORVASTATIN CALCIUM 80 MG/1
80 TABLET, FILM COATED ORAL DAILY
Qty: 30 TABLET | Refills: 11 | Status: SHIPPED | OUTPATIENT
Start: 2024-06-12 | End: 2025-06-07

## 2024-06-12 RX ORDER — ASPIRIN 81 MG/1
81 TABLET, CHEWABLE ORAL DAILY
Qty: 30 TABLET | Refills: 11 | Status: SHIPPED | OUTPATIENT
Start: 2024-06-12 | End: 2025-06-07

## 2024-06-12 RX ORDER — CLOPIDOGREL BISULFATE 75 MG/1
75 TABLET ORAL DAILY
Qty: 30 TABLET | Refills: 11 | Status: SHIPPED | OUTPATIENT
Start: 2024-06-12 | End: 2025-06-07

## 2024-06-12 NOTE — LETTER
June 12, 2024     Monty Mora MD  [address] [place] [address]  [place] [address]    Patient: Siena Olmedo   YOB: 1960   Date of Visit: 6/12/2024       Dear Dr. Mora:    Thank you for referring Seina Olmedo to me for evaluation. Below are my notes for this consultation.    If you have questions, please do not hesitate to call me. I look forward to following your patient along with you.         Sincerely,        CAM Preston        CC: MD Dede Mendoza CRNP  6/12/2024 11:47 AM  Sign when Signing Visit  Cardiology   Hospital Follow Up   Office Visit Note  Siena Olmedo   64 y.o.   female   MRN: [id number]  [facility] [place]  [address] [place] [address]  [address]  [facility] [address]  [phone]    PCP: Monty Mora MD  Cardiologist: Will be Dr. Jones            Summary of Plan:  Heart healthy diet: Mediterranean or DASH  Adherence to DAPT reinforced  Nonfasting BMP today  Pharmacologic nuclear stress test re: residual CAD as recommended by interventional cardiology  Switch metoprolol  tartrate to metoprolol succinate 50 mg daily  Warm compresses 4 times daily to the hematoma, right forearm  Provided work excuse to return to work in 1 week.  If she needs additional time advised to contact me.  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 Jones          Assessment/Plan  CAD, recent non-STEMI ADM 6/1 - 6/4/2024  Underwent PCI/AKASH to the culprit 80% lesion in the mid RCA.  On aspirin 81, Plavix 75 daily, high intensity statin, beta-blocker  Residual: 70% mid LAD, 90% OM1, 80% RPDA--out pt stress test recommended by interventional cardiology  EF 50% with Wall motion abnormalities in the RCA distribution  Hypertension. /64 lisinopril 10 mg daily, plus lisinopril HCTZ 20/12.5 daily--as prior to her admission, metoprolol tartrate  25 mg every 12--> switch to succinate 50 mg daily once complete her current prescription  Hyperlipidemia, mixed.  On atorvastatin 80 mg daily.  Calculated LDL 76.  Repeat lipid profile 4 to 12 weeks   Latest Reference Range & Units 10/19/20 09:50 06/04/21 08:34 09/30/21 10:06 06/02/24 05:08   Cholesterol See Comment mg/dL 256 (H) 260 (H) 146 133   Triglycerides See Comment mg/dL 360 (H) 351 (H) 175 (H) 88   HDL >=50 mg/dL 35 (L) 31 (L) 32 (L) 39 (L)   Non-HDL Cholesterol mg/dl 221 229 114    LDL Calculated 0 - 100 mg/dL 149 (H) 159 (H) 79 76   Hypothyroidisn  Family history premature CAD:  mother with fatal MI, oldest sister with CABG in the her 50s. Youngest sister with heart transplant in her 50s.   Pre diabetes.  hgA1c 5.8  Cardiac testing  [facility] 6/3/24. Cardiac cath performed via the RFA. Closed with angioseal. The R radial artery was accessed but the vessel spasmed just prior to starting the PCI.   Long Prox -  Mid RCA lesion - 80 % culprit lesion--> AKASH  •  Mid LAD lesion is 70% stenosed.  •  1st Mrg lesion is 90% stenosed. Very small vessel.  •  RPDA lesion is 80% stenosed. Very small vessel.   Plan; Cardiac rehab. Outpatient nuclear stress test - The LAD would be amendable to PCI. OM1 and the PDA are very small vessels. Both would be higher risk PCI's.   TTE 6/3/24. EF 50%.   The following segments are akinetic: mid inferior and apical inferior. The following segments are hypokinetic: basal inferoseptal, basal inferior, basal inferolateral and mid inferoseptal.All other segments are normal. Mild TR                  HPI  Siena Olmedo is a 64 y.o.year old female with hypertension, marked dyslipidemia, chronic vertigo on meclizine, hypothyroidism and GERD.  She has family history of premature CAD.  Her mother had a fatal MI.  Her older sister had CABG in her 50s.  Her younger sister underwent heart transplant in her 50s.      She last saw Dr. Chaney in November 2020.  He noted in the past she had  episodes of syncope that were rare and infrequent.  Her total cholesterol was 256, calculated  non-, triglycerides 360.  He recommended a Zio patch and if unrevealing, a loop recorder implant, however she had no health insurance and was not interested in pursuing further testing.  She was lost to cardiology follow-up    Denies tobacco, etoh, illicit drug use.       ADM 6/1 - 6/4/2024  CC: Chest burning/tightness while cleaning her house, with associated nausea, diaphoresis  EKG: ST T wave changes suggestive of ischemia, laterally and inferiorly.    Elevated troponin:444> 726> 1684> 2074   She underwent left heart catheterization which showed multivessel disease.  She underwent PCI with AKASH to culprit to 80% mid RCA.  Otherwise she had an 80% stenosis of the RPDA, very small vessel, 90% stenosis of OM1, very small's muscle and 70% stenosis of the mid LAD.  An outpatient nuclear stress is recommended.  She was placed on DAPT with aspirin and Plavix.  She was started on high intensity statin with atorvastatin 80 mg daily along with beta-blocker.  Her echocardiogram showed an EF of 50% with wall motion abnormalities in the RCA territory.      6/12/24  Hospital follow-up.  Review of systems: She has a significant hematoma of the right forearm.  It is fairly edematous and tender.  She has a good distal pulse.  She denies any chest pain or shortness of breath.  She is adherent to her medical therapy without problems  Today we reviewed her coronary angiogram and her echocardiogram she is aware of the findings  A pharmacological nuclear stress test was recommended by interventional cardiology to assess for residual disease  I encouraged adherence to DAPT  I recommended nonfasting BMP to assess her renal function and electrolytes  We discussed her baseline lipids.  She is tolerating her current therapy.  Her statin was uptitrated.  I gave her slip for reassessment in 4 to 12 weeks.  I encouraged adoption of a  heart healthy diet and education was provided.  She received the MI booklet.  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  This will be difficult with her right forearm issues 1 was provided for her today.        Review of Systems   Constitutional: Negative for chills.   Cardiovascular:  Negative for chest pain, claudication, cyanosis, dyspnea on exertion, irregular heartbeat, leg swelling, near-syncope, orthopnea, palpitations, paroxysmal nocturnal dyspnea and syncope.   Respiratory:  Negative for cough and shortness of breath.    Musculoskeletal:         Hematoma right forearm   Gastrointestinal:  Negative for heartburn and nausea.   Neurological:  Negative for dizziness, focal weakness, headaches, light-headedness and weakness.   All other systems reviewed and are negative.            Assessment  Diagnoses and all orders for this visit:    Hospital discharge follow-up    Coronary artery disease involving native coronary artery of native heart without angina pectoris  -     NM myocardial perfusion spect (rx stress and/or rest); Future  -     Basic metabolic panel; Future  -     CBC and Platelet; Future  -     metoprolol succinate (TOPROL-XL) 50 mg 24 hr tablet; Take 1 tablet (50 mg total) by mouth daily    Benign hypertension  -     NM myocardial perfusion spect (rx stress and/or rest); Future  -     metoprolol succinate (TOPROL-XL) 50 mg 24 hr tablet; Take 1 tablet (50 mg total) by mouth daily    Status post insertion of drug eluting coronary artery stent  -     NM myocardial perfusion spect (rx stress and/or rest); Future  -     Basic metabolic panel; Future    Mixed hyperlipidemia  -     NM myocardial perfusion spect (rx stress and/or rest); Future    Encounter for monitoring antiplatelet therapy    ACS (acute coronary syndrome) (HCC)  -     clopidogrel (PLAVIX) 75 mg tablet; Take 1 tablet (75 mg total) by mouth daily  -     aspirin 81 mg chewable tablet;  Chew 1 tablet (81 mg total) daily  -     atorvastatin (LIPITOR) 80 mg tablet; Take 1 tablet (80 mg total) by mouth daily        Past Medical History:   Diagnosis Date   • Disease of thyroid gland    • High cholesterol    • Hypertension    • Vertigo        Past Surgical History:   Procedure Laterality Date   • CARDIAC CATHETERIZATION N/A 6/3/2024    Procedure: Cardiac pci;  Surgeon: Mele Carlson MD;  [facility];  Service: Cardiology   • CARDIAC CATHETERIZATION N/A 6/3/2024    Procedure: Cardiac Coronary Angiogram;  Surgeon: Mele Carlson MD;  [facility];  Service: Cardiology           Allergies  Allergies   Allergen Reactions   • Sulfa Antibiotics Rash         Medications    Current Outpatient Medications:   •  Ascorbic Acid (vitamin C) 1000 MG tablet, Take 1,000 mg by mouth daily, Disp: , Rfl:   •  aspirin 81 mg chewable tablet, Chew 1 tablet (81 mg total) daily, Disp: 30 tablet, Rfl: 11  •  atorvastatin (LIPITOR) 80 mg tablet, Take 1 tablet (80 mg total) by mouth daily, Disp: 30 tablet, Rfl: 11  •  cholecalciferol (VITAMIN D3) 1,000 units tablet, Take 1,000 Units by mouth daily, Disp: , Rfl:   •  clopidogrel (PLAVIX) 75 mg tablet, Take 1 tablet (75 mg total) by mouth daily, Disp: 30 tablet, Rfl: 11  •  famotidine (PEPCID) 20 mg tablet, Take 1 tablet (20 mg total) by mouth daily for 5 days, Disp: 5 tablet, Rfl: 0  •  levothyroxine 50 mcg tablet, Take 50 mcg by mouth daily, Disp: , Rfl:   •  lidocaine (Lidoderm) 5 %, Apply 1 patch topically over 12 hours daily for 5 days Remove & Discard patch within 12 hours or as directed by MD, Disp: 5 patch, Rfl: 0  •  lisinopril (ZESTRIL) 10 mg tablet, Take 10 mg by mouth daily, Disp: , Rfl:   •  lisinopril-hydrochlorothiazide (PRINZIDE,ZESTORETIC) 20-12.5 MG per tablet, Take 1 tablet by mouth daily, Disp: , Rfl:   •  meclizine (ANTIVERT) 25 mg tablet, Take 25 mg by mouth 3 (three) times a day as needed, Disp: , Rfl:   •  metoprolol  succinate (TOPROL-XL) 50 mg 24 hr tablet, Take 1 tablet (50 mg total) by mouth daily, Disp: 30 tablet, Rfl: 11  •  Multiple Vitamins-Minerals (multivitamin with minerals) tablet, Take 1 tablet by mouth daily, Disp: , Rfl:   •  sucralfate (CARAFATE) 1 g tablet, Take 1 tablet (1 g total) by mouth 4 (four) times a day for 7 days, Disp: 28 tablet, Rfl: 0  •  vitamin E 100 UNIT capsule, Take 100 Units by mouth daily, Disp: , Rfl:       Social History     Socioeconomic History   • Marital status: /Civil Union     Spouse name: Not on file   • Number of children: Not on file   • Years of education: Not on file   • Highest education level: Not on file   Occupational History   • Not on file   Tobacco Use   • Smoking status: Never   • Smokeless tobacco: Never   Vaping Use   • Vaping status: Not on file   Substance and Sexual Activity   • Alcohol use: Not Currently   • Drug use: Not Currently   • Sexual activity: Not on file   Other Topics Concern   • Not on file   Social History Narrative   • Not on file     Social Determinants of Health     Financial Resource Strain: Not on file   Food Insecurity: Not on file   Transportation Needs: Not on file   Physical Activity: Not on file   Stress: Not on file   Social Connections: Not on file   Intimate Partner Violence: Not on file   Housing Stability: Not on file       History reviewed. No pertinent family history.    Physical Exam  Vitals and nursing note reviewed.   Constitutional:       General: She is not in acute distress.     Appearance: She is not diaphoretic.   HENT:      Head: Normocephalic and atraumatic.   Eyes:      Conjunctiva/sclera: Conjunctivae normal.   Cardiovascular:      Rate and Rhythm: Normal rate and regular rhythm.      Pulses: Intact distal pulses.      Heart sounds: Normal heart sounds.   Pulmonary:      Effort: Pulmonary effort is normal.      Breath sounds: Normal breath sounds.   Abdominal:      General: Bowel sounds are normal.      Palpations:  "Abdomen is soft.   Musculoskeletal:         General: Normal range of motion.      Cervical back: Normal range of motion and neck supple.      Comments: Significant edema and ecchymosis noted about the volar aspect of the right forearm.  Radial pulse is +2   Skin:     General: Skin is warm and dry.   Neurological:      Mental Status: She is alert and oriented to person, place, and time.         Vitals: Blood pressure 110/64, pulse 57, height 5' 2\" (1.575 m), weight 61.2 kg (135 lb), SpO2 97%.   Wt Readings from Last 3 Encounters:   06/12/24 61.2 kg (135 lb)   06/03/24 64.9 kg (143 lb)   02/07/24 61.7 kg (136 lb)           Labs & Results:  Lab Results   Component Value Date    WBC 10.02 06/04/2024    HGB 11.7 06/04/2024    HCT 36.9 06/04/2024    MCV 97 06/04/2024     06/04/2024     BNP   Date Value Ref Range Status   06/01/2024 72 0 - 100 pg/mL Final     No components found for: \"CHEM\"  No results found for: \"CKTOTAL\", \"TROPONINI\", \"TROPONINT\", \"CKMBINDEX\"  No results found for this or any previous visit.    No results found for this or any previous visit.    No valid procedures specified.  No results found for this or any previous visit.                    This note was completed in part utilizing [employer] direct voice recognition software.   Grammatical errors, random word insertion, spelling mistakes, and incomplete sentences may be an occasional consequence of the system secondary to software limitations, ambient noise and hardware issues. At the time of dictation, efforts were made to edit, clarify and /or correct errors.  Please read the chart carefully and recognize, using context, where substitutions have occurred.  If you have any questions or concerns about the context, text or information contained within the body of this dictation, please contact myself, the provider, for further clarification      "

## 2024-06-12 NOTE — LETTER
June 12, 2024     Patient: Siena Olmedo  YOB: 1960  Date of Visit: 6/12/2024      To Whom it May Concern:    Siena Olmedo is under my professional care. Siena was seen in my office on 6/12/2024. Siena may return to work on June 17th .    If you have any questions or concerns, please don't hesitate to call.         Sincerely,          CAM Preston

## 2024-06-12 NOTE — TELEPHONE ENCOUNTER
Caller: Siena Olmedo    Doctor: Dede Crews    Reason for call: Patient is expressing concern with going back to work at the  she says its a lot of lifting and she was given a work note from Dede to go back on 6/17/24 and she would really like a note that is extended till after the Stress test on July 1st. Patient said she can pick it up.     Call back#: [phone]

## 2024-06-12 NOTE — LETTER
June 12, 2024     Monty Mora MD  [address] [place] [address]  [place] [address]    Patient: Siena Olmedo   YOB: 1960   Date of Visit: 6/12/2024       Dear Dr. Mora:    Thank you for referring Siena Olmedo to me for evaluation. Below are my notes for this consultation.    If you have questions, please do not hesitate to call me. I look forward to following your patient along with you.         Sincerely,        CAM Preston        CC: MD Dede Mendoza CRNP  6/12/2024  8:39 AM  Sign when Signing Visit  Cardiology   Hospital Follow Up   Office Visit Note  Siena Olmedo   64 y.o.   female   MRN: [id number]  [facility] [place]  [address] [place] [address]  [address]  [facility] [address]  [phone]    PCP: Monty Mora MD  Cardiologist: Will be Dr. Jones            Summary of Plan:  ***  Adherence to DAPT reinforced  Nonfasting BMP  Pharmacologic nuclear stress test  Switch metoprolol to tartrate to metoprolol succinate 50 mg daily  Fasting lipid profile with direct LDL 4 weeks  Follow-up with Dr. Jones 8 to 12 weeks          Assessment/Plan  CAD, recent non-STEMI ADM 6/1 - 6/4/2024  Underwent PCI/AKASH to the culprit 80% lesion in the mid RCA.  On aspirin 81, Plavix 75 daily, high intensity statin, beta-blocker  Residual: 70% mid LAD, 90% OM1, 80% RPDA--out pt stress test recommended by interventional cardiology  Echo: Wall motion abnormalities in RCA distribution  Hypertension. BP ***lisinopril 10 mg daily, plus lisinopril HCTZ 20/12.5 daily***, Toprol tartrate 25 mg every 12  Hyperlipidemia, mixed.  On atorvastatin 80 mg daily.  Calculated LDL 76   Latest Reference Range & Units 10/19/20 09:50 06/04/21 08:34 09/30/21 10:06 06/02/24 05:08   Cholesterol See Comment mg/dL 256 (H) 260 (H) 146 133   Triglycerides See Comment mg/dL 360 (H) 351 (H) 175 (H) 88   HDL >=50 mg/dL 35 (L) 31 (L) 32 (L) 39 (L)   Non-HDL Cholesterol mg/dl 221 229  114    LDL Calculated 0 - 100 mg/dL 149 (H) 159 (H) 79 76   Hypothyroidisn  Family history premature CAD:  mother with fatal MI, oldest sister with CABG in the her 50s. Youngest sister with heart transplant in her 50s.   Cardiac testing  [facility] 6/3/24. Cardiac cath performed via the RFA. Closed with angioseal. The R radial artery was accessed but the vessel spasmed just prior to starting the PCI.   Long Prox -  Mid RCA lesion - 80 % culprit lesion--> AKASH    Mid LAD lesion is 70% stenosed.    1st Mrg lesion is 90% stenosed. Very small vessel.    RPDA lesion is 80% stenosed. Very small vessel.   Plan; Cardiac rehab. Outpatient nuclear stress test - The LAD would be amendable to PCI. OM1 and the PDA are very small vessels. Both would be higher risk PCI's.   TTE 6/3/24. EF 50%.   The following segments are akinetic: mid inferior and apical inferior. The following segments are hypokinetic: basal inferoseptal, basal inferior, basal inferolateral and mid inferoseptal.All other segments are normal. Mild TR                  HPI  Siena Olmedo is a 64 y.o.year old female with hypertension, marked dyslipidemia, chronic vertigo on meclizine, hypothyroidism and GERD.  She has family history of premature CAD.  Her mother had a fatal MI.  Her older sister had CABG in her 50s.  Her younger sister underwent heart transplant in her 50s.      She last saw Dr. Chaney in November 2020.  He noted in the past she had episodes of syncope that were rare and infrequent.  Her total cholesterol was 256, calculated  non-, triglycerides 360.  He recommended a Zio patch and if unrevealing, a loop recorder implant, however she had no health insurance and was not interested in pursuing further testing.  She was lost to cardiology follow-up    Denies tobacco, etoh, illicit drug use.       ADM 6/1 - 6/4/2024  CC: Chest burning/tightness while cleaning her house, with associated nausea, diaphoresis  EKG: ST T wave changes suggestive  of ischemia, laterally and inferiorly.    Elevated troponin:444> 726> 1684> 2074   She underwent left heart catheterization which showed multivessel disease.  She underwent PCI with AKASH to culprit to 80% mid RCA.  Otherwise she had an 80% stenosis of the RPDA, very small vessel, 90% stenosis of OM1, very small's muscle and 70% stenosis of the mid LAD.  An outpatient nuclear stress is recommended.  She was placed on DAPT with aspirin and Plavix.  She was started on high intensity statin with atorvastatin 80 mg daily along with beta-blocker.  Her echocardiogram showed an EF of 50% with wall motion abnormalities in the RCA territory.      6/12/24                          Review of Systems   Constitutional: Negative for chills.   Cardiovascular:  Negative for chest pain, claudication, cyanosis, dyspnea on exertion, irregular heartbeat, leg swelling, near-syncope, orthopnea, palpitations, paroxysmal nocturnal dyspnea and syncope.   Respiratory:  Negative for cough and shortness of breath.    Gastrointestinal:  Negative for heartburn and nausea.   Neurological:  Negative for dizziness, focal weakness, headaches, light-headedness and weakness.   All other systems reviewed and are negative.            Assessment  There are no diagnoses linked to this encounter.    Past Medical History:   Diagnosis Date    Disease of thyroid gland     High cholesterol     Hypertension     Vertigo        Past Surgical History:   Procedure Laterality Date    CARDIAC CATHETERIZATION N/A 6/3/2024    Procedure: Cardiac pci;  Surgeon: Mele Carlson MD;  [facility];  Service: Cardiology    CARDIAC CATHETERIZATION N/A 6/3/2024    Procedure: Cardiac Coronary Angiogram;  Surgeon: Mele Carlson MD;  [facility];  Service: Cardiology           Allergies  Allergies   Allergen Reactions    Sulfa Antibiotics Rash         Medications    Current Outpatient Medications:     Ascorbic Acid (vitamin C) 1000 MG tablet,  Take 1,000 mg by mouth daily, Disp: , Rfl:     aspirin 81 mg chewable tablet, Chew 1 tablet (81 mg total) daily, Disp: 30 tablet, Rfl: 0    atorvastatin (LIPITOR) 80 mg tablet, Take 1 tablet (80 mg total) by mouth daily for 15 days, Disp: 15 tablet, Rfl: 0    cholecalciferol (VITAMIN D3) 1,000 units tablet, Take 1,000 Units by mouth daily, Disp: , Rfl:     clopidogrel (PLAVIX) 75 mg tablet, Take 1 tablet (75 mg total) by mouth daily, Disp: 30 tablet, Rfl: 0    dicyclomine (BENTYL) 20 mg tablet, Take 1 tablet (20 mg total) by mouth every 6 (six) hours as needed (abdominal pain) for up to 5 days, Disp: 20 tablet, Rfl: 0    famotidine (PEPCID) 20 mg tablet, Take 1 tablet (20 mg total) by mouth daily for 5 days, Disp: 5 tablet, Rfl: 0    levothyroxine 50 mcg tablet, Take 50 mcg by mouth daily, Disp: , Rfl:     lidocaine (Lidoderm) 5 %, Apply 1 patch topically over 12 hours daily for 5 days Remove & Discard patch within 12 hours or as directed by MD, Disp: 5 patch, Rfl: 0    lisinopril (ZESTRIL) 10 mg tablet, Take 10 mg by mouth daily, Disp: , Rfl:     lisinopril-hydrochlorothiazide (PRINZIDE,ZESTORETIC) 20-12.5 MG per tablet, Take 1 tablet by mouth daily, Disp: , Rfl:     meclizine (ANTIVERT) 25 mg tablet, Take 25 mg by mouth 3 (three) times a day as needed, Disp: , Rfl:     metoprolol tartrate (LOPRESSOR) 25 mg tablet, Take 1 tablet (25 mg total) by mouth every 12 (twelve) hours for 15 days, Disp: 30 tablet, Rfl: 0    Multiple Vitamins-Minerals (multivitamin with minerals) tablet, Take 1 tablet by mouth daily, Disp: , Rfl:     omeprazole (PriLOSEC) 20 mg delayed release capsule, Take 20 mg by mouth daily, Disp: , Rfl:     sucralfate (CARAFATE) 1 g tablet, Take 1 tablet (1 g total) by mouth 4 (four) times a day for 7 days, Disp: 28 tablet, Rfl: 0    vitamin E 100 UNIT capsule, Take 100 Units by mouth daily, Disp: , Rfl:       Social History     Socioeconomic History    Marital status: /Civil Union     Spouse  name: Not on file    Number of children: Not on file    Years of education: Not on file    Highest education level: Not on file   Occupational History    Not on file   Tobacco Use    Smoking status: Never    Smokeless tobacco: Never   Vaping Use    Vaping status: Not on file   Substance and Sexual Activity    Alcohol use: Not Currently    Drug use: Not Currently    Sexual activity: Not on file   Other Topics Concern    Not on file   Social History Narrative    Not on file     Social Determinants of Health     Financial Resource Strain: Not on file   Food Insecurity: Not on file   Transportation Needs: Not on file   Physical Activity: Not on file   Stress: Not on file   Social Connections: Not on file   Intimate Partner Violence: Not on file   Housing Stability: Not on file       No family history on file.    Physical Exam  Vitals and nursing note reviewed.   Constitutional:       General: She is not in acute distress.     Appearance: She is not diaphoretic.   HENT:      Head: Normocephalic and atraumatic.   Eyes:      Conjunctiva/sclera: Conjunctivae normal.   Cardiovascular:      Rate and Rhythm: Normal rate and regular rhythm.      Pulses: Intact distal pulses.      Heart sounds: Normal heart sounds.   Pulmonary:      Effort: Pulmonary effort is normal.      Breath sounds: Normal breath sounds.   Abdominal:      General: Bowel sounds are normal.      Palpations: Abdomen is soft.   Musculoskeletal:         General: Normal range of motion.      Cervical back: Normal range of motion and neck supple.   Skin:     General: Skin is warm and dry.   Neurological:      Mental Status: She is alert and oriented to person, place, and time.         Vitals: There were no vitals taken for this visit.   Wt Readings from Last 3 Encounters:   06/03/24 64.9 kg (143 lb)   02/07/24 61.7 kg (136 lb)   01/26/24 62.6 kg (138 lb)           Labs & Results:  Lab Results   Component Value Date    WBC 10.02 06/04/2024    HGB 11.7 06/04/2024     "HCT 36.9 06/04/2024    MCV 97 06/04/2024     06/04/2024     BNP   Date Value Ref Range Status   06/01/2024 72 0 - 100 pg/mL Final     No components found for: \"CHEM\"  No results found for: \"CKTOTAL\", \"TROPONINI\", \"TROPONINT\", \"CKMBINDEX\"  No results found for this or any previous visit.    No results found for this or any previous visit.    No valid procedures specified.  No results found for this or any previous visit.                    This note was completed in part utilizing m-modal fluency direct voice recognition software.   Grammatical errors, random word insertion, spelling mistakes, and incomplete sentences may be an occasional consequence of the system secondary to software limitations, ambient noise and hardware issues. At the time of dictation, efforts were made to edit, clarify and /or correct errors.  Please read the chart carefully and recognize, using context, where substitutions have occurred.  If you have any questions or concerns about the context, text or information contained within the body of this dictation, please contact myself, the provider, for further clarification      "

## 2024-06-17 NOTE — TELEPHONE ENCOUNTER
Caller: Siena    Doctor: Dede Crews    Reason for call: Change retlurn to work date to Monday, July 8    Call back#: [phone]     Patient inquiring if return to work date can be changed to Monday, July 8, 2024 from current date of Friday, July 5.  If so, please change letter to reflect the same and send to patient via [employer].

## 2024-06-17 NOTE — TELEPHONE ENCOUNTER
Caller: Siena    Doctor: Lolis    Reason for call: Pt calling in about work note. Wondering if it can be mailed or if she can have it sent to her MyChart? Also wondering if note can be extended until after July 4 so that she has time to get results from testing. [facility]  is closed on July 4 anyway.     Call back#: [phone]

## 2024-06-21 ENCOUNTER — HOSPITAL ENCOUNTER (EMERGENCY)
Facility: HOSPITAL | Age: 64
Discharge: HOME | End: 2024-06-21
Attending: STUDENT IN AN ORGANIZED HEALTH CARE EDUCATION/TRAINING PROGRAM
Payer: COMMERCIAL

## 2024-06-21 ENCOUNTER — APPOINTMENT (EMERGENCY)
Dept: RADIOLOGY | Facility: HOSPITAL | Age: 64
End: 2024-06-21
Payer: COMMERCIAL

## 2024-06-21 VITALS
RESPIRATION RATE: 20 BRPM | DIASTOLIC BLOOD PRESSURE: 56 MMHG | SYSTOLIC BLOOD PRESSURE: 115 MMHG | TEMPERATURE: 97.7 F | OXYGEN SATURATION: 95 % | HEART RATE: 60 BPM

## 2024-06-21 DIAGNOSIS — R42 DIZZINESS: Primary | ICD-10-CM

## 2024-06-21 DIAGNOSIS — R42 LIGHTHEADEDNESS: ICD-10-CM

## 2024-06-21 LAB
2HR DELTA HS TROPONIN: -1 NG/L
ALBUMIN SERPL BCG-MCNC: 4.6 G/DL (ref 3.5–5)
ALP SERPL-CCNC: 53 U/L (ref 34–104)
ALT SERPL W P-5'-P-CCNC: 28 U/L (ref 7–52)
ANION GAP SERPL CALCULATED.3IONS-SCNC: 8 MMOL/L (ref 4–13)
AST SERPL W P-5'-P-CCNC: 18 U/L (ref 13–39)
BASOPHILS # BLD AUTO: 0.05 THOUSANDS/ÂΜL (ref 0–0.1)
BASOPHILS NFR BLD AUTO: 1 % (ref 0–1)
BILIRUB SERPL-MCNC: 0.7 MG/DL (ref 0.2–1)
BNP SERPL-MCNC: 116 PG/ML (ref 0–100)
BUN SERPL-MCNC: 20 MG/DL (ref 5–25)
CALCIUM SERPL-MCNC: 10.2 MG/DL (ref 8.4–10.2)
CARDIAC TROPONIN I PNL SERPL HS: 6 NG/L
CARDIAC TROPONIN I PNL SERPL HS: 7 NG/L
CHLORIDE SERPL-SCNC: 99 MMOL/L (ref 96–108)
CO2 SERPL-SCNC: 29 MMOL/L (ref 21–32)
CREAT SERPL-MCNC: 1.01 MG/DL (ref 0.6–1.3)
EOSINOPHIL # BLD AUTO: 0.15 THOUSAND/ÂΜL (ref 0–0.61)
EOSINOPHIL NFR BLD AUTO: 2 % (ref 0–6)
ERYTHROCYTE [DISTWIDTH] IN BLOOD BY AUTOMATED COUNT: 11.9 % (ref 11.6–15.1)
GFR SERPL CREATININE-BSD FRML MDRD: 59 ML/MIN/1.73SQ M
GLUCOSE SERPL-MCNC: 91 MG/DL (ref 65–140)
HCT VFR BLD AUTO: 38.7 % (ref 34.8–46.1)
HGB BLD-MCNC: 13.1 G/DL (ref 11.5–15.4)
IMM GRANULOCYTES # BLD AUTO: 0.01 THOUSAND/UL (ref 0–0.2)
IMM GRANULOCYTES NFR BLD AUTO: 0 % (ref 0–2)
LYMPHOCYTES # BLD AUTO: 1.29 THOUSANDS/ÂΜL (ref 0.6–4.47)
LYMPHOCYTES NFR BLD AUTO: 20 % (ref 14–44)
MCH RBC QN AUTO: 31.3 PG (ref 26.8–34.3)
MCHC RBC AUTO-ENTMCNC: 33.9 G/DL (ref 31.4–37.4)
MCV RBC AUTO: 92 FL (ref 82–98)
MONOCYTES # BLD AUTO: 0.66 THOUSAND/ÂΜL (ref 0.17–1.22)
MONOCYTES NFR BLD AUTO: 10 % (ref 4–12)
NEUTROPHILS # BLD AUTO: 4.16 THOUSANDS/ÂΜL (ref 1.85–7.62)
NEUTS SEG NFR BLD AUTO: 67 % (ref 43–75)
NRBC BLD AUTO-RTO: 0 /100 WBCS
PLATELET # BLD AUTO: 358 THOUSANDS/UL (ref 149–390)
PMV BLD AUTO: 9.2 FL (ref 8.9–12.7)
POTASSIUM SERPL-SCNC: 4 MMOL/L (ref 3.5–5.3)
PROT SERPL-MCNC: 8.2 G/DL (ref 6.4–8.4)
RBC # BLD AUTO: 4.19 MILLION/UL (ref 3.81–5.12)
SODIUM SERPL-SCNC: 136 MMOL/L (ref 135–147)
WBC # BLD AUTO: 6.32 THOUSAND/UL (ref 4.31–10.16)

## 2024-06-21 PROCEDURE — 80053 COMPREHEN METABOLIC PANEL: CPT | Performed by: STUDENT IN AN ORGANIZED HEALTH CARE EDUCATION/TRAINING PROGRAM

## 2024-06-21 PROCEDURE — 93005 ELECTROCARDIOGRAM TRACING: CPT

## 2024-06-21 PROCEDURE — 83880 ASSAY OF NATRIURETIC PEPTIDE: CPT

## 2024-06-21 PROCEDURE — 99285 EMERGENCY DEPT VISIT HI MDM: CPT | Performed by: STUDENT IN AN ORGANIZED HEALTH CARE EDUCATION/TRAINING PROGRAM

## 2024-06-21 PROCEDURE — 71045 X-RAY EXAM CHEST 1 VIEW: CPT

## 2024-06-21 PROCEDURE — 96365 THER/PROPH/DIAG IV INF INIT: CPT

## 2024-06-21 PROCEDURE — 84484 ASSAY OF TROPONIN QUANT: CPT | Performed by: STUDENT IN AN ORGANIZED HEALTH CARE EDUCATION/TRAINING PROGRAM

## 2024-06-21 PROCEDURE — 85025 COMPLETE CBC W/AUTO DIFF WBC: CPT | Performed by: STUDENT IN AN ORGANIZED HEALTH CARE EDUCATION/TRAINING PROGRAM

## 2024-06-21 PROCEDURE — 36415 COLL VENOUS BLD VENIPUNCTURE: CPT

## 2024-06-21 PROCEDURE — 99285 EMERGENCY DEPT VISIT HI MDM: CPT

## 2024-06-21 RX ADMIN — SODIUM CHLORIDE, SODIUM LACTATE, POTASSIUM CHLORIDE, AND CALCIUM CHLORIDE 500 ML: .6; .31; .03; .02 INJECTION, SOLUTION INTRAVENOUS at 15:33

## 2024-06-21 NOTE — ED ATTENDING ATTESTATION
6/21/2024  I, Ney Bautista DO, saw and evaluated the patient. I have discussed the patient with the resident/non-physician practitioner and agree with the resident's/non-physician practitioner's findings, Plan of Care, and MDM as documented in the resident's/non-physician practitioner's note, except where noted. All available labs and Radiology studies were reviewed.  I was present for key portions of any procedure(s) performed by the resident/non-physician practitioner and I was immediately available to provide assistance.       At this point I agree with the current assessment done in the Emergency Department.  I have conducted an independent evaluation of this patient a history and physical is as follows:    64-year-old female presents to the ED for evaluation of intermittent lightheadedness/vertigo.  Has history of vertigo and takes meclizine for this.  Today, started with symptoms that were not resolving.  Has intermittent episodes of similar symptoms though usually it resolves.  No recent travel.  No recent illness.  No known provoking or palliating factors.  No chest pain associated with this.  On exam, is resting comfortably no acute distress, speaking full sentences with no respiratory difficulty, pulse is regular with normal rate, moving all extremities no gross motor deficit, gait is stable and without ataxia.  Labs show no leukocytosis, stable hemoglobin, normal platelet count, no acute electrode abnormalities, normal renal function, no acute LFT abnormalities, troponin is negative x 2, BNP is mildly elevated at 116.  Chest x-ray shows no acute cardiopulmonary pathology as interpreted by myself pending final radiology read.  Patient was given small fluid bolus.  Patient remains ambulatory while in the emergency department without any return of symptoms.  Resident discussed results with patient.  Plan will be for discharge with close outpatient follow-up.  Stable for discharge and agreeable to the plan.   Strict return ED precautions given    ED Course         Critical Care Time  Procedures

## 2024-06-21 NOTE — ED PROVIDER NOTES
History  Chief Complaint   Patient presents with    Dizziness     Pt reports sudden onset of dizziness 1.5 hours ago, nausea, BP was 90/60. Pt reports she was near passing out but did not. Denies CP, headache, vision problems, denies numbness/tingling in extremities. Pt had heart attack earlier this month. Pt has vertigo hx, takes meclizine w/o relief.      HPI    (Siena Olmedo) Siena Olmedo is a 64 y.o. female presents to the emergency department on June 22, 2024 for dizziness/lightheadedness onset 4 hours ago. Pt reports sx started while she was in the shower, sudden onset, associated with nausea/reflux sensation. Took Meclizine and reports dizziness is resolved, however, she is currently experiencing lightheadedness. She was found to have ACS 2 weeks ago, and needed stent placement. At the time, she had similar symptoms but with paresthesia of the extremities, which is absent this time. Denies LOC. No recent illnesses, sick contacts, fevers. Patient denies chest pain, SOB, vomiting, weakness/numbness of extremities, or any other complaint at this time.      Allergies include:  Allergies   Allergen Reactions    Sulfa Antibiotics Rash         Immunizations:  Immunization History   Administered Date(s) Administered    COVID-19 PFIZER VACCINE 0.3 ML IM 04/20/2021, 05/12/2021, 11/18/2021    COVID-19 Pfizer Vac BIVALENT Rashawn-sucrose 12 Yr+ IM 12/09/2022    COVID-19 Pfizer vac (Rashawn-sucrose, gray cap) 12 yr+ IM 04/08/2022     Immunizations Reviewed.    Prior to Admission Medications   Prescriptions Last Dose Informant Patient Reported? Taking?   Ascorbic Acid (vitamin C) 1000 MG tablet  Self Yes No   Sig: Take 1,000 mg by mouth daily   Multiple Vitamins-Minerals (multivitamin with minerals) tablet  Self Yes No   Sig: Take 1 tablet by mouth daily   aspirin 81 mg chewable tablet   No No   Sig: Chew 1 tablet (81 mg total) daily   atorvastatin (LIPITOR) 80 mg tablet   No No   Sig: Take 1 tablet (80 mg  total) by mouth daily   cholecalciferol (VITAMIN D3) 1,000 units tablet  Self Yes No   Sig: Take 1,000 Units by mouth daily   clopidogrel (PLAVIX) 75 mg tablet   No No   Sig: Take 1 tablet (75 mg total) by mouth daily   famotidine (PEPCID) 20 mg tablet  Self No No   Sig: Take 1 tablet (20 mg total) by mouth daily for 5 days   levothyroxine 50 mcg tablet  Self Yes No   Sig: Take 50 mcg by mouth daily   lidocaine (Lidoderm) 5 %  Self No No   Sig: Apply 1 patch topically over 12 hours daily for 5 days Remove & Discard patch within 12 hours or as directed by MD   lisinopril (ZESTRIL) 10 mg tablet  Self Yes No   Sig: Take 10 mg by mouth daily   lisinopril-hydrochlorothiazide (PRINZIDE,ZESTORETIC) 20-12.5 MG per tablet  Self Yes No   Sig: Take 1 tablet by mouth daily   meclizine (ANTIVERT) 25 mg tablet  Self Yes No   Sig: Take 25 mg by mouth 3 (three) times a day as needed   metoprolol succinate (TOPROL-XL) 50 mg 24 hr tablet   No No   Sig: Take 1 tablet (50 mg total) by mouth daily   sucralfate (CARAFATE) 1 g tablet  Self No No   Sig: Take 1 tablet (1 g total) by mouth 4 (four) times a day for 7 days   vitamin E 100 UNIT capsule  Self Yes No   Sig: Take 100 Units by mouth daily      Facility-Administered Medications: None       Past Medical History:   Diagnosis Date    Disease of thyroid gland     High cholesterol     Hypertension     Vertigo        Past Surgical History:   Procedure Laterality Date    CARDIAC CATHETERIZATION N/A 6/3/2024    Procedure: Cardiac pci;  Surgeon: Mele Carlson MD;  Location: [facility];  Service: Cardiology    CARDIAC CATHETERIZATION N/A 6/3/2024    Procedure: Cardiac Coronary Angiogram;  Surgeon: Mlee Carlson MD;  Location: [facility];  Service: Cardiology       No family history on file.  I have reviewed and agree with the history as documented.    E-Cigarette/Vaping    E-Cigarette Use Never Assessed      E-Cigarette/Vaping Substances     Social History     Tobacco  Use    Smoking status: Never    Smokeless tobacco: Never   Substance Use Topics    Alcohol use: Not Currently    Drug use: Not Currently        Review of Systems   Constitutional:  Negative for activity change, fever and unexpected weight change.   Respiratory:  Negative for cough, chest tightness and shortness of breath.    Cardiovascular:  Negative for chest pain and palpitations.   Gastrointestinal:  Negative for abdominal pain, diarrhea, nausea and vomiting.   Genitourinary:  Negative for dysuria and hematuria.   Skin:  Negative for wound.   Allergic/Immunologic: Negative for immunocompromised state.   Neurological:  Positive for dizziness and light-headedness. Negative for syncope.   All other systems reviewed and are negative.      Physical Exam  ED Triage Vitals [06/21/24 1314]   Temperature Pulse Respirations Blood Pressure SpO2   97.7 °F (36.5 °C) 65 20 116/59 100 %      Temp Source Heart Rate Source Patient Position - Orthostatic VS BP Location FiO2 (%)   Oral Monitor -- Right arm --      Pain Score       --             Orthostatic Vital Signs  Vitals:    06/21/24 1314 06/21/24 1430 06/21/24 1500 06/21/24 1600   BP: 116/59 114/62 103/55 115/56   Pulse: 65 63 62 60       Physical Exam  Vitals and nursing note reviewed.   Constitutional:       Appearance: She is not toxic-appearing or diaphoretic.   HENT:      Head: Normocephalic and atraumatic.      Right Ear: External ear normal.      Left Ear: External ear normal.      Nose: Nose normal.      Mouth/Throat:      Mouth: Mucous membranes are moist.   Eyes:      General: No scleral icterus.     Extraocular Movements: Extraocular movements intact.      Conjunctiva/sclera: Conjunctivae normal.   Cardiovascular:      Rate and Rhythm: Normal rate and regular rhythm.      Pulses: Normal pulses.           Radial pulses are 2+ on the right side.        Dorsalis pedis pulses are 2+ on the right side and 2+ on the left side.      Heart sounds: Normal heart sounds, S1  normal and S2 normal. No murmur heard.  Pulmonary:      Effort: Pulmonary effort is normal. No respiratory distress.      Breath sounds: Normal breath sounds. No stridor. No wheezing.   Abdominal:      Palpations: Abdomen is soft.      Tenderness: There is no abdominal tenderness.   Musculoskeletal:         General: Normal range of motion.      Cervical back: Normal range of motion.   Skin:     General: Skin is warm and dry.   Neurological:      General: No focal deficit present.      Mental Status: She is alert and oriented to person, place, and time.      Comments: No visual field deficits, denies changes in visual acuity  Pupils PERRLA, EOM intact  Sensation intact and equal in upper, middle, and lower face  Able to open mouth fully, no TMJ tenderness, able to puff out cheeks, able keep eyes closed through tension  No facial droop or dysarthria  Able to hear finger rub equally b/l  Able to stick out tongue and move in both directions  No uvular deviation  Able to turn head and shrug shoulders against resistance  Gait not assessed.   Psychiatric:         Mood and Affect: Mood normal.         ED Medications  Medications   lactated ringers bolus 500 mL (0 mL Intravenous Stopped 6/21/24 1645)       Diagnostic Studies  Results Reviewed       Procedure Component Value Units Date/Time    HS Troponin I 2hr [[id number]]  (Normal) Collected: 06/21/24 1533    Lab Status: Final result Specimen: Blood from Arm, Left Updated: 06/21/24 1601     hs TnI 2hr 6 ng/L      Delta 2hr hsTnI -1 ng/L     B-Type Natriuretic Peptide(BNP) [[id number]]  (Abnormal) Collected: 06/21/24 1326    Lab Status: Final result Specimen: Blood from Arm, Left Updated: 06/21/24 1554      pg/mL     HS Troponin 0hr (reflex protocol) [[id number]]  (Normal) Collected: 06/21/24 1326    Lab Status: Final result Specimen: Blood from Arm, Left Updated: 06/21/24 1356     hs TnI 0hr 7 ng/L     Comprehensive metabolic panel [[id number]] Collected: 06/21/24 1326     Lab Status: Final result Specimen: Blood from Arm, Left Updated: 06/21/24 1351     Sodium 136 mmol/L      Potassium 4.0 mmol/L      Chloride 99 mmol/L      CO2 29 mmol/L      ANION GAP 8 mmol/L      BUN 20 mg/dL      Creatinine 1.01 mg/dL      Glucose 91 mg/dL      Calcium 10.2 mg/dL      AST 18 U/L      ALT 28 U/L      Alkaline Phosphatase 53 U/L      Total Protein 8.2 g/dL      Albumin 4.6 g/dL      Total Bilirubin 0.70 mg/dL      eGFR 59 ml/min/1.73sq m     Narrative:      [employer] Kid[employer] guidelines for Chronic Kidney Disease (CKD):     Stage 1 with normal or high GFR (GFR > 90 mL/min/1.73 square meters)    Stage 2 Mild CKD (GFR = 60-89 mL/min/1.73 square meters)    Stage 3A Moderate CKD (GFR = 45-59 mL/min/1.73 square meters)    Stage 3B Moderate CKD (GFR = 30-44 mL/min/1.73 square meters)    Stage 4 Severe CKD (GFR = 15-29 mL/min/1.73 square meters)    Stage 5 End Stage CKD (GFR <15 mL/min/1.73 square meters)  Note: GFR calculation is accurate only with a steady state creatinine    CBC and differential [[id number]] Collected: 06/21/24 1326    Lab Status: Final result Specimen: Blood from Arm, Left Updated: 06/21/24 1341     WBC 6.32 Thousand/uL      RBC 4.19 Million/uL      Hemoglobin 13.1 g/dL      Hematocrit 38.7 %      MCV 92 fL      MCH 31.3 pg      MCHC 33.9 g/dL      RDW 11.9 %      MPV 9.2 fL      Platelets 358 Thousands/uL      nRBC 0 /100 WBCs      Segmented % 67 %      Immature Grans % 0 %      Lymphocytes % 20 %      Monocytes % 10 %      Eosinophils Relative 2 %      Basophils Relative 1 %      Absolute Neutrophils 4.16 Thousands/µL      Absolute Immature Grans 0.01 Thousand/uL      Absolute Lymphocytes 1.29 Thousands/µL      Absolute Monocytes 0.66 Thousand/µL      Eosinophils Absolute 0.15 Thousand/µL      Basophils Absolute 0.05 Thousands/µL                    XR chest 1 view portable   ED Interpretation by Dominique Shearer MD (06/21 9136)   No acute cardiopulmonary findings.  Independently interpreted by myself.      Final Result by Oleg Altamirano MD (06/21 1640)      No acute cardiopulmonary disease.            Workstation performed: [id number]               Procedures  ECG 12 Lead Documentation Only    Date/Time: 6/22/2024 12:23 AM    Performed by: Dominique Shearer MD  Authorized by: Dominique Shearer MD    Patient location:  ED  Previous ECG:     Previous ECG:  Compared to current    Comparison ECG info:  6/2/2024    Comparison to cardiac monitor: Yes    Interpretation:     Interpretation: normal    Rate:     ECG rate:  63    ECG rate assessment: normal    Rhythm:     Rhythm: sinus rhythm    Ectopy:     Ectopy: none    QRS:     QRS axis:  Normal    QRS intervals:  Normal  Conduction:     Conduction: normal    ST segments:     ST segments:  Normal  T waves:     T waves: inverted      T waves comment:  Seen on previous EKG    Inverted:  II, III and aVF        ED Course  ED Course as of 06/22/24 0059   Fri Jun 21, 2024   1506 DDx including but not limited to: metabolic abnormality, cardiac abnormality, intracranial process, adverse reaction       1508 hs TnI 0hr: 7  Pending 2H trop, last episode was approximately 1 hour age.   1509 XR chest 1 view portable  No acute cardiopulmonary findings. Independently interpreted by myself.   1607 hs TnI 2hr: 6   1700 DDx including but not limited to: metabolic abnormality, cardiac abnormality, adverse reaction to medications, dehydration, orthostatics, BPPV     1701 Re-evaluated patient, ambulated to the bathroom without assistance.   1737 BNP(!): 116  Slight elevation   1745 Had prolonged discussion with patient, offered admission. However, patient declined, states her lightheadedness is improved and she is now able to ambulate better. Instructed patient to take BP measurements, continue her current medications, and to f/u with her PCP and cardiologist. RTER precautions discussed at length. Pt and  voice understanding and agree with plan. All questions  and concerns addressed at this time.                                       Medical Decision Making  Amount and/or Complexity of Data Reviewed  Labs: ordered. Decision-making details documented in ED Course.  Radiology: ordered and independent interpretation performed. Decision-making details documented in ED Course.        See ED course for MDM.    Disposition  Final diagnoses:   Dizziness   Lightheadedness     Time reflects when diagnosis was documented in both MDM as applicable and the Disposition within this note       Time User Action Codes Description Comment    6/21/2024  5:45 PM Dominique Shearer Add [R42] Dizziness     6/21/2024  5:45 PM Dominique Shearer Add [R42] Lightheadedness           ED Disposition       ED Disposition   Discharge    Condition   Stable    Date/Time   Fri Jun 21, 2024  5:45 PM    Comment   Siena Olmedo discharge to home/self care.                   Follow-up Information       Follow up With Specialties Details Why Contact Info    Monty Mora MD Internal Medicine In 2 days  [address] [place] [address] [phone]      Your cardiologist  In 2 days regarding medications             Discharge Medication List as of 6/21/2024  5:47 PM        CONTINUE these medications which have NOT CHANGED    Details   Ascorbic Acid (vitamin C) 1000 MG tablet Take 1,000 mg by mouth daily, Historical Med      aspirin 81 mg chewable tablet Chew 1 tablet (81 mg total) daily, Starting Wed 6/12/2024, Until Sat 6/7/2025, Normal      atorvastatin (LIPITOR) 80 mg tablet Take 1 tablet (80 mg total) by mouth daily, Starting Wed 6/12/2024, Until Sat 6/7/2025, Normal      cholecalciferol (VITAMIN D3) 1,000 units tablet Take 1,000 Units by mouth daily, Historical Med      clopidogrel (PLAVIX) 75 mg tablet Take 1 tablet (75 mg total) by mouth daily, Starting Wed 6/12/2024, Until Sat 6/7/2025, Normal      famotidine (PEPCID) 20 mg tablet Take 1 tablet (20 mg total) by mouth daily for 5 days, Starting Fri  1/26/2024, Until Wed 6/12/2024, Normal      levothyroxine 50 mcg tablet Take 50 mcg by mouth daily, Starting Mon 5/6/2024, Historical Med      lidocaine (Lidoderm) 5 % Apply 1 patch topically over 12 hours daily for 5 days Remove & Discard patch within 12 hours or as directed by MD, Starting Fri 1/26/2024, Until Wed 6/12/2024, Normal      lisinopril (ZESTRIL) 10 mg tablet Take 10 mg by mouth daily, Starting Thu 10/29/2020, Historical Med      lisinopril-hydrochlorothiazide (PRINZIDE,ZESTORETIC) 20-12.5 MG per tablet Take 1 tablet by mouth daily, Starting Mon 5/13/2024, Historical Med      meclizine (ANTIVERT) 25 mg tablet Take 25 mg by mouth 3 (three) times a day as needed, Starting Thu 11/5/2020, Historical Med      metoprolol succinate (TOPROL-XL) 50 mg 24 hr tablet Take 1 tablet (50 mg total) by mouth daily, Starting Wed 6/12/2024, Normal      Multiple Vitamins-Minerals (multivitamin with minerals) tablet Take 1 tablet by mouth daily, Historical Med      sucralfate (CARAFATE) 1 g tablet Take 1 tablet (1 g total) by mouth 4 (four) times a day for 7 days, Starting Fri 1/26/2024, Until Wed 6/12/2024, Normal      vitamin E 100 UNIT capsule Take 100 Units by mouth daily, Historical Med               PDMP Review       None             ED Provider  Attending physically available and evaluated Siena Olmedo. I managed the patient along with the ED Attending.    Electronically Signed by           Dominique Shearer MD  06/22/24 0059

## 2024-06-23 LAB
ATRIAL RATE: 63 BPM
P AXIS: 49 DEGREES
PR INTERVAL: 136 MS
QRS AXIS: 3 DEGREES
QRSD INTERVAL: 76 MS
QT INTERVAL: 414 MS
QTC INTERVAL: 423 MS
T WAVE AXIS: -28 DEGREES
VENTRICULAR RATE: 63 BPM

## 2024-06-23 PROCEDURE — 93010 ELECTROCARDIOGRAM REPORT: CPT | Performed by: INTERNAL MEDICINE

## 2024-07-01 ENCOUNTER — HOSPITAL ENCOUNTER (OUTPATIENT)
Dept: NON INVASIVE DIAGNOSTICS | Facility: CLINIC | Age: 64
Discharge: HOME | End: 2024-07-01
Payer: COMMERCIAL

## 2024-07-01 DIAGNOSIS — Z95.5 STATUS POST INSERTION OF DRUG ELUTING CORONARY ARTERY STENT: ICD-10-CM

## 2024-07-01 DIAGNOSIS — I10 BENIGN HYPERTENSION: ICD-10-CM

## 2024-07-01 DIAGNOSIS — I25.10 CORONARY ARTERY DISEASE INVOLVING NATIVE CORONARY ARTERY OF NATIVE HEART WITHOUT ANGINA PECTORIS: ICD-10-CM

## 2024-07-01 DIAGNOSIS — E78.2 MIXED HYPERLIPIDEMIA: ICD-10-CM

## 2024-07-01 LAB
CHEST PAIN STATEMENT: NORMAL
MAX DIASTOLIC BP: 65 MMHG
MAX PREDICTED HEART RATE: 157 BPM
NUC STRESS EJECTION FRACTION: 62 %
PROTOCOL NAME: NORMAL
RATE PRESSURE PRODUCT: 214
REASON FOR TERMINATION: NORMAL
SL CV REST NUCLEAR ISOTOPE DOSE: 10.4 MCI
SL CV STRESS NUCLEAR ISOTOPE DOSE: 32.1 MCI
SL CV STRESS RECOVERY BP: NORMAL MMHG
SL CV STRESS RECOVERY HR: 83 BPM
SL CV STRESS RECOVERY O2 SAT: 99 %
STRESS ANGINA INDEX: 0
STRESS BASELINE BP: NORMAL MMHG
STRESS BASELINE HR: 54 BPM
STRESS O2 SAT REST: 98 %
STRESS PEAK HR: 107 BPM
STRESS POST EXERCISE DUR MIN: 3 MIN
STRESS POST EXERCISE DUR SEC: 0 SEC
STRESS POST O2 SAT PEAK: 97 %
STRESS POST PEAK BP: 2 MMHG
STRESS POST PEAK HR: 107 BPM
STRESS POST PEAK SYSTOLIC BP: 144 MMHG
TARGET HR FORMULA: NORMAL
TEST INDICATION: NORMAL

## 2024-07-01 PROCEDURE — 93018 CV STRESS TEST I&R ONLY: CPT | Performed by: INTERNAL MEDICINE

## 2024-07-01 PROCEDURE — A9502 TC99M TETROFOSMIN: HCPCS

## 2024-07-01 PROCEDURE — 93016 CV STRESS TEST SUPVJ ONLY: CPT | Performed by: INTERNAL MEDICINE

## 2024-07-01 PROCEDURE — 78452 HT MUSCLE IMAGE SPECT MULT: CPT | Performed by: INTERNAL MEDICINE

## 2024-07-01 PROCEDURE — 93017 CV STRESS TEST TRACING ONLY: CPT

## 2024-07-01 PROCEDURE — 78452 HT MUSCLE IMAGE SPECT MULT: CPT

## 2024-07-01 RX ORDER — REGADENOSON 0.08 MG/ML
0.4 INJECTION, SOLUTION INTRAVENOUS ONCE
Status: COMPLETED | OUTPATIENT
Start: 2024-07-01 | End: 2024-07-01

## 2024-07-01 RX ORDER — AMINOPHYLLINE 25 MG/ML
50 INJECTION, SOLUTION INTRAVENOUS ONCE
Status: COMPLETED | OUTPATIENT
Start: 2024-07-01 | End: 2024-07-01

## 2024-07-01 RX ADMIN — AMINOPHYLLINE 50 MG: 25 INJECTION, SOLUTION INTRAVENOUS at 13:25

## 2024-07-01 RX ADMIN — REGADENOSON 0.4 MG: 0.08 INJECTION, SOLUTION INTRAVENOUS at 13:22

## 2024-07-02 ENCOUNTER — TELEPHONE (OUTPATIENT)
Dept: CARDIOLOGY CLINIC | Facility: CLINIC | Age: 64
End: 2024-07-02

## 2024-07-02 NOTE — TELEPHONE ENCOUNTER
----- Message from CAM Roberson sent at 7/1/2024  9:03 PM EDT -----  Regarding: results phone call  Gómez Sena can you please Call Jose Mguillermina and inform her the stress test does not show rebecca reversible ischemia.  Thank you Lolis    Spoke with pt re: normal ST rslts.

## 2024-07-10 NOTE — PROGRESS NOTES
Cardiology   ED Follow Up   Office Visit Note  Siena Olmedo   64 y.o.   female   MRN: [id number]  [facility]  [address]  [address]  [place] [id number] [phone]    PCP: Monty Mora MD  Cardiologist: Will be Dr. Jones            Summary of Plan:  Heart healthy diet: Mediterranean or DASH  Adherence to DAPT reinforced  Increase Toprol to 75 mg/d  Encourage participation in cardiac rehab.  Is not clear if she will participate given co-pays  Fasting lipid profile - she was provided a lab slip  Follow-up with Dr. Jones 8/22          Assessment/Plan  CAD, recent non-STEMI ADM 6/1 - 6/4/2024  Underwent PCI/AKASH to the culprit 80% lesion in the mid RCA.  On aspirin 81, Plavix 75 daily, high intensity statin, beta-blocker  Residual: 70% mid LAD, 90% OM1, 80% RPDA--out pt stress test recommended by interventional cardiology  EF 50% with Wall motion abnormalities in the RCA distribution  Hypertension. /66 lisinopril 10 mg daily, plus lisinopril HCTZ 20/12.5 daily--as prior to her admission, metoprolol succinate 50 mg daily.  Will add 25 mg to increase to 75 mg daily  Hyperlipidemia, mixed.  On atorvastatin 80 mg daily  from 40 mg/d).  Calculated LDL 76.  Repeat lipid profile 4 to 12 weeks   Latest Reference Range & Units 10/19/20 09:50 06/04/21 08:34 09/30/21 10:06 06/02/24 05:08   Cholesterol See Comment mg/dL 256 (H) 260 (H) 146 133   Triglycerides See Comment mg/dL 360 (H) 351 (H) 175 (H) 88   HDL >=50 mg/dL 35 (L) 31 (L) 32 (L) 39 (L)   Non-HDL Cholesterol mg/dl 221 229 114    LDL Calculated 0 - 100 mg/dL 149 (H) 159 (H) 79 76   Hypothyroidisn  Family history premature CAD:  mother with fatal MI, oldest sister with CABG in the her 50s. Youngest sister with heart transplant in her 50s.   Pre diabetes.  hgA1c 5.8  Cardiac testing  [facility] 6/3/24. Cardiac cath performed via the RFA. Closed with angioseal. The R radial artery was accessed but the vessel spasmed  just prior to starting the PCI.   Long Prox -  Mid RCA lesion - 80 % culprit lesion--> AKASH    Mid LAD lesion is 70% stenosed.    1st Mrg lesion is 90% stenosed. Very small vessel.    RPDA lesion is 80% stenosed. Very small vessel.   Plan; Cardiac rehab. Outpatient nuclear stress test - The LAD would be amendable to PCI. OM1 and the PDA are very small vessels. Both would be higher risk PCI's.   TTE 6/3/24. EF 50%.   The following segments are akinetic: mid inferior and apical inferior. The following segments are hypokinetic: basal inferoseptal, basal inferior, basal inferolateral and mid inferoseptal.All other segments are normal. Mild TR  SPECT 7/1/24. There is a small focal moderate intensity fixed defect at the mid to apical inferior wall.  No ischemia.  This defect may represent either diaphragmatic attenuation or a small area of focal scar.                    DREW Olmedo is a 64 y.o.year old female with hypertension, marked dyslipidemia, chronic vertigo on meclizine, hypothyroidism and GERD.  She has family history of premature CAD.  Her mother had a fatal MI.  Her older sister had CABG in her 50s.  Her younger sister underwent heart transplant in her 50s.      She last saw Dr. Chaney in November 2020.  He noted in the past she had episodes of syncope that were rare and infrequent.  Her total cholesterol was 256, calculated  non-, triglycerides 360.  He recommended a Zio patch and if unrevealing, a loop recorder implant, however she had no health insurance and was not interested in pursuing further testing.  She was lost to cardiology follow-up    Denies tobacco, etoh, illicit drug use.       ADM 6/1 - 6/4/2024  CC: Chest burning/tightness while cleaning her house, with associated nausea, diaphoresis  EKG: ST T wave changes suggestive of ischemia, laterally and inferiorly.    Elevated troponin:444> 726> 1684> 2074   She underwent left heart catheterization which showed multivessel  disease.  She underwent PCI with AKASH to culprit to 80% mid RCA.  Otherwise she had an 80% stenosis of the RPDA, very small vessel, 90% stenosis of OM1, very small's muscle and 70% stenosis of the mid LAD.  An outpatient nuclear stress is recommended.  She was placed on DAPT with aspirin and Plavix.  She was started on high intensity statin with atorvastatin 80 mg daily along with beta-blocker.  Her echocardiogram showed an EF of 50% with wall motion abnormalities in the RCA territory.      6/12/24  Hospital follow-up.  Review of systems: She has a significant hematoma of the right forearm.  It is fairly edematous and tender.  She has a good distal pulse.  She denies any chest pain or shortness of breath.  She is adherent to her medical therapy without problems  Today we reviewed her coronary angiogram and her echocardiogram she is aware of the findings  A pharmacological nuclear stress test was recommended by interventional cardiology to assess for residual disease  I encouraged adherence to DAPT  I recommended nonfasting BMP to assess her renal function and electrolytes  We discussed her baseline lipids.  She is tolerating her current therapy.  Her statin was uptitrated.  I gave her slip for reassessment in 4 to 12 weeks.  I encouraged adoption of a heart healthy diet and education was provided.  She received the MI booklet.  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  This will be difficult with her right forearm issues 1 was provided for her today.      6/21/24 ED visit  Dizziness  NSR 63 bpm. /59  Normal K, Cr  CBC normal     HS trop 7,6  CXR - NAD  Given 500 cc IV flds  DCd to cardiology F/U        7/11/24  Follow-up ED visit.  She comes in with her .  She has had no further episodes of dizziness.  However, she has known vertigo and has a prescription for meclizine.  She was encouraged to use this.  Her blood pressure is elevated  today at 148/66.  Heart rate is 67.  I will increase Toprol to 75 mg daily.  She requested an extra 25 mg tablet as she did not want to cut the 50 mg tablets to get this dose.  I encourage participation in cardiac rehab.  It iss not clear  she will participate given co-pays.  I also reviewed with her her most recent stress test that showed no ischemia  I provided a slip for reassessment of her lipids given her atorvastatin was increased.  If she has recurrent dizziness, despite taking the meclizine, I recommend she follow with her PCP.  I also encouraged hydration which she tells me she has been doing         Review of Systems   Constitutional: Negative for chills.   Cardiovascular:  Negative for chest pain, claudication, cyanosis, dyspnea on exertion, irregular heartbeat, leg swelling, near-syncope, orthopnea, palpitations, paroxysmal nocturnal dyspnea and syncope.   Respiratory:  Negative for cough and shortness of breath.    Gastrointestinal:  Negative for heartburn and nausea.   Neurological:  Negative for dizziness, focal weakness, headaches, light-headedness and weakness.   All other systems reviewed and are negative.            Assessment  Diagnoses and all orders for this visit:    Coronary artery disease involving native coronary artery of native heart without angina pectoris  -     metoprolol succinate (TOPROL-XL) 25 mg 24 hr tablet; Take 1 tablet (25 mg total) by mouth daily  -     Ambulatory  Referral to Cardiac Rehabilitation; Future    Status post insertion of drug eluting coronary artery stent  -     Ambulatory  Referral to Cardiac Rehabilitation; Future    Benign hypertension  -     metoprolol succinate (TOPROL-XL) 25 mg 24 hr tablet; Take 1 tablet (25 mg total) by mouth daily    Mixed hyperlipidemia  -     Lipid panel; Future    Dizziness          Past Medical History:   Diagnosis Date    Disease of thyroid gland     High cholesterol     Hypertension     Vertigo        Past Surgical History:    Procedure Laterality Date    CARDIAC CATHETERIZATION N/A 6/3/2024    Procedure: Cardiac pci;  Surgeon: Mele Carlson MD;  Location: [facility];  Service: Cardiology    CARDIAC CATHETERIZATION N/A 6/3/2024    Procedure: Cardiac Coronary Angiogram;  Surgeon: Mele Carlson MD;  Location: [facility];  Service: Cardiology           Allergies  Allergies   Allergen Reactions    Sulfa Antibiotics Rash         Medications    Current Outpatient Medications:     Ascorbic Acid (vitamin C) 1000 MG tablet, Take 1,000 mg by mouth daily, Disp: , Rfl:     aspirin 81 mg chewable tablet, Chew 1 tablet (81 mg total) daily, Disp: 30 tablet, Rfl: 11    atorvastatin (LIPITOR) 80 mg tablet, Take 1 tablet (80 mg total) by mouth daily, Disp: 30 tablet, Rfl: 11    cholecalciferol (VITAMIN D3) 1,000 units tablet, Take 1,000 Units by mouth daily, Disp: , Rfl:     clopidogrel (PLAVIX) 75 mg tablet, Take 1 tablet (75 mg total) by mouth daily, Disp: 30 tablet, Rfl: 11    famotidine (PEPCID) 20 mg tablet, Take 1 tablet (20 mg total) by mouth daily for 5 days, Disp: 5 tablet, Rfl: 0    levothyroxine 50 mcg tablet, Take 50 mcg by mouth daily, Disp: , Rfl:     lidocaine (Lidoderm) 5 %, Apply 1 patch topically over 12 hours daily for 5 days Remove & Discard patch within 12 hours or as directed by MD, Disp: 5 patch, Rfl: 0    lisinopril (ZESTRIL) 10 mg tablet, Take 10 mg by mouth daily, Disp: , Rfl:     lisinopril-hydrochlorothiazide (PRINZIDE,ZESTORETIC) 20-12.5 MG per tablet, Take 1 tablet by mouth daily, Disp: , Rfl:     meclizine (ANTIVERT) 25 mg tablet, Take 25 mg by mouth 3 (three) times a day as needed, Disp: , Rfl:     metoprolol succinate (TOPROL-XL) 25 mg 24 hr tablet, Take 1 tablet (25 mg total) by mouth daily, Disp: 90 tablet, Rfl: 3    metoprolol succinate (TOPROL-XL) 50 mg 24 hr tablet, Take 1 tablet (50 mg total) by mouth daily, Disp: 30 tablet, Rfl: 11    Multiple Vitamins-Minerals (multivitamin with minerals)  tablet, Take 1 tablet by mouth daily, Disp: , Rfl:     vitamin E 100 UNIT capsule, Take 100 Units by mouth daily, Disp: , Rfl:     sucralfate (CARAFATE) 1 g tablet, Take 1 tablet (1 g total) by mouth 4 (four) times a day for 7 days (Patient not taking: Reported on 7/11/2024), Disp: 28 tablet, Rfl: 0      Social History     Socioeconomic History    Marital status: /Civil Union     Spouse name: Not on file    Number of children: Not on file    Years of education: Not on file    Highest education level: Not on file   Occupational History    Not on file   Tobacco Use    Smoking status: Never    Smokeless tobacco: Never   Vaping Use    Vaping status: Not on file   Substance and Sexual Activity    Alcohol use: Not Currently    Drug use: Not Currently    Sexual activity: Not on file   Other Topics Concern    Not on file   Social History Narrative    Not on file     Social Determinants of Health     Financial Resource Strain: Not on file   Food Insecurity: Not on file   Transportation Needs: Not on file   Physical Activity: Not on file   Stress: Not on file   Social Connections: Not on file   Intimate Partner Violence: Not on file   Housing Stability: Not on file       History reviewed. No pertinent family history.    Physical Exam  Vitals and nursing note reviewed.   Constitutional:       General: She is not in acute distress.     Appearance: She is not diaphoretic.   HENT:      Head: Normocephalic and atraumatic.   Eyes:      Conjunctiva/sclera: Conjunctivae normal.   Cardiovascular:      Rate and Rhythm: Normal rate and regular rhythm.      Pulses: Intact distal pulses.      Heart sounds: Normal heart sounds.   Pulmonary:      Effort: Pulmonary effort is normal.      Breath sounds: Normal breath sounds.   Abdominal:      General: Bowel sounds are normal.      Palpations: Abdomen is soft.   Musculoskeletal:         General: Normal range of motion.      Cervical back: Normal range of motion and neck supple.       "Comments: Significant edema and ecchymosis noted about the volar aspect of the right forearm.  Radial pulse is +2   Skin:     General: Skin is warm and dry.   Neurological:      Mental Status: She is alert and oriented to person, place, and time.         Vitals: Blood pressure 148/66, pulse 67, weight 63.6 kg (140 lb 3.2 oz), SpO2 98%.   Wt Readings from Last 3 Encounters:   07/11/24 63.6 kg (140 lb 3.2 oz)   06/12/24 61.2 kg (135 lb)   06/03/24 64.9 kg (143 lb)           Labs & Results:  Lab Results   Component Value Date    WBC 6.32 06/21/2024    HGB 13.1 06/21/2024    HCT 38.7 06/21/2024    MCV 92 06/21/2024     06/21/2024     BNP   Date Value Ref Range Status   06/21/2024 116 (H) 0 - 100 pg/mL Final   06/01/2024 72 0 - 100 pg/mL Final     No components found for: \"CHEM\"  No results found for: \"CKTOTAL\", \"TROPONINI\", \"TROPONINT\", \"CKMBINDEX\"  No results found for this or any previous visit.    No results found for this or any previous visit.    No valid procedures specified.  No results found for this or any previous visit.                    This note was completed in part utilizing [employer] direct voice recognition software.   Grammatical errors, random word insertion, spelling mistakes, and incomplete sentences may be an occasional consequence of the system secondary to software limitations, ambient noise and hardware issues. At the time of dictation, efforts were made to edit, clarify and /or correct errors.  Please read the chart carefully and recognize, using context, where substitutions have occurred.  If you have any questions or concerns about the context, text or information contained within the body of this dictation, please contact myself, the provider, for further clarification      "

## 2024-07-11 ENCOUNTER — OFFICE VISIT (OUTPATIENT)
Dept: CARDIOLOGY CLINIC | Facility: CLINIC | Age: 64
End: 2024-07-11
Payer: COMMERCIAL

## 2024-07-11 VITALS
DIASTOLIC BLOOD PRESSURE: 66 MMHG | WEIGHT: 140.2 LBS | OXYGEN SATURATION: 98 % | HEART RATE: 67 BPM | SYSTOLIC BLOOD PRESSURE: 148 MMHG | BODY MASS INDEX: 25.64 KG/M2

## 2024-07-11 DIAGNOSIS — R42 DIZZINESS: ICD-10-CM

## 2024-07-11 DIAGNOSIS — Z95.5 STATUS POST INSERTION OF DRUG ELUTING CORONARY ARTERY STENT: ICD-10-CM

## 2024-07-11 DIAGNOSIS — E78.2 MIXED HYPERLIPIDEMIA: ICD-10-CM

## 2024-07-11 DIAGNOSIS — I10 BENIGN HYPERTENSION: ICD-10-CM

## 2024-07-11 DIAGNOSIS — I25.10 CORONARY ARTERY DISEASE INVOLVING NATIVE CORONARY ARTERY OF NATIVE HEART WITHOUT ANGINA PECTORIS: Primary | ICD-10-CM

## 2024-07-11 PROCEDURE — 99214 OFFICE O/P EST MOD 30 MIN: CPT | Performed by: NURSE PRACTITIONER

## 2024-07-11 RX ORDER — METOPROLOL SUCCINATE 25 MG/1
25 TABLET, EXTENDED RELEASE ORAL DAILY
Qty: 90 TABLET | Refills: 3 | Status: SHIPPED | OUTPATIENT
Start: 2024-07-11

## 2024-07-11 NOTE — LETTER
July 11, 2024     Monty Mora MD  [address] [place] [address]  [place] [address]    Patient: Siena Olmedo   YOB: 1960   Date of Visit: 7/11/2024       Dear Dr. Mora:    Thank you for referring Siena Olmedo to me for evaluation. Below are my notes for this consultation.    If you have questions, please do not hesitate to call me. I look forward to following your patient along with you.         Sincerely,        CAM Preston        CC: MD Dede Mendoza CRNP  7/11/2024  9:26 AM  Sign when Signing Visit  Cardiology   ED Follow Up   Office Visit Note  Siena Olmedo   64 y.o.   female   MRN: [id number]  [facility] [place]  [address] [place] [address]  [address]  [facility] [address]  [phone]    PCP: Monty Mora MD  Cardiologist: Will be Dr. Jones            Summary of Plan:  Heart healthy diet: Mediterranean or DASH  Adherence to DAPT reinforced  Increase Toprol to 75 mg/d  Encourage participation in cardiac rehab.  Is not clear if she will participate given co-pays  Fasting lipid profile - she was provided a lab slip  Follow-up with Dr. Jones 8/22          Assessment/Plan  CAD, recent non-STEMI ADM 6/1 - 6/4/2024  Underwent PCI/AKASH to the culprit 80% lesion in the mid RCA.  On aspirin 81, Plavix 75 daily, high intensity statin, beta-blocker  Residual: 70% mid LAD, 90% OM1, 80% RPDA--out pt stress test recommended by interventional cardiology  EF 50% with Wall motion abnormalities in the RCA distribution  Hypertension. /66 lisinopril 10 mg daily, plus lisinopril HCTZ 20/12.5 daily--as prior to her admission, metoprolol succinate 50 mg daily.  Will add 25 mg to increase to 75 mg daily  Hyperlipidemia, mixed.  On atorvastatin 80 mg daily  from 40 mg/d).  Calculated LDL 76.  Repeat lipid profile 4 to 12 weeks   Latest Reference Range & Units 10/19/20 09:50 06/04/21 08:34 09/30/21 10:06 06/02/24 05:08   Cholesterol See Comment  mg/dL 256 (H) 260 (H) 146 133   Triglycerides See Comment mg/dL 360 (H) 351 (H) 175 (H) 88   HDL >=50 mg/dL 35 (L) 31 (L) 32 (L) 39 (L)   Non-HDL Cholesterol mg/dl 221 229 114    LDL Calculated 0 - 100 mg/dL 149 (H) 159 (H) 79 76   Hypothyroidisn  Family history premature CAD:  mother with fatal MI, oldest sister with CABG in the her 50s. Youngest sister with heart transplant in her 50s.   Pre diabetes.  hgA1c 5.8  Cardiac testing  [facility] 6/3/24. Cardiac cath performed via the RFA. Closed with angioseal. The R radial artery was accessed but the vessel spasmed just prior to starting the PCI.   Long Prox -  Mid RCA lesion - 80 % culprit lesion--> AKASH  •  Mid LAD lesion is 70% stenosed.  •  1st Mrg lesion is 90% stenosed. Very small vessel.  •  RPDA lesion is 80% stenosed. Very small vessel.   Plan; Cardiac rehab. Outpatient nuclear stress test - The LAD would be amendable to PCI. OM1 and the PDA are very small vessels. Both would be higher risk PCI's.   TTE 6/3/24. EF 50%.   The following segments are akinetic: mid inferior and apical inferior. The following segments are hypokinetic: basal inferoseptal, basal inferior, basal inferolateral and mid inferoseptal.All other segments are normal. Mild TR  SPECT 7/1/24. There is a small focal moderate intensity fixed defect at the mid to apical inferior wall.  No ischemia.  This defect may represent either diaphragmatic attenuation or a small area of focal scar.                    DREW Olmedo is a 64 y.o.year old female with hypertension, marked dyslipidemia, chronic vertigo on meclizine, hypothyroidism and GERD.  She has family history of premature CAD.  Her mother had a fatal MI.  Her older sister had CABG in her 50s.  Her younger sister underwent heart transplant in her 50s.      She last saw Dr. Chaney in November 2020.  He noted in the past she had episodes of syncope that were rare and infrequent.  Her total cholesterol was 256, calculated  non-HDL  221, triglycerides 360.  He recommended a Zio patch and if unrevealing, a loop recorder implant, however she had no health insurance and was not interested in pursuing further testing.  She was lost to cardiology follow-up    Denies tobacco, etoh, illicit drug use.       ADM 6/1 - 6/4/2024  CC: Chest burning/tightness while cleaning her house, with associated nausea, diaphoresis  EKG: ST T wave changes suggestive of ischemia, laterally and inferiorly.    Elevated troponin:444> 726> 1684> 2074   She underwent left heart catheterization which showed multivessel disease.  She underwent PCI with AKASH to culprit to 80% mid RCA.  Otherwise she had an 80% stenosis of the RPDA, very small vessel, 90% stenosis of OM1, very small's muscle and 70% stenosis of the mid LAD.  An outpatient nuclear stress is recommended.  She was placed on DAPT with aspirin and Plavix.  She was started on high intensity statin with atorvastatin 80 mg daily along with beta-blocker.  Her echocardiogram showed an EF of 50% with wall motion abnormalities in the RCA territory.      6/12/24  Hospital follow-up.  Review of systems: She has a significant hematoma of the right forearm.  It is fairly edematous and tender.  She has a good distal pulse.  She denies any chest pain or shortness of breath.  She is adherent to her medical therapy without problems  Today we reviewed her coronary angiogram and her echocardiogram she is aware of the findings  A pharmacological nuclear stress test was recommended by interventional cardiology to assess for residual disease  I encouraged adherence to DAPT  I recommended nonfasting BMP to assess her renal function and electrolytes  We discussed her baseline lipids.  She is tolerating her current therapy.  Her statin was uptitrated.  I gave her slip for reassessment in 4 to 12 weeks.  I encouraged adoption of a heart healthy diet and education was provided.  She received the MI booklet.  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  This will be difficult with her right forearm issues 1 was provided for her today.      6/21/24 ED visit  Dizziness  NSR 63 bpm. /59  Normal K, Cr  CBC normal     HS trop 7,6  CXR - NAD  Given 500 cc IV flds  DCd to cardiology F/U        7/11/24  Follow-up ED visit.  She comes in with her .  She has had no further episodes of dizziness.  However, she has known vertigo and has a prescription for meclizine.  She was encouraged to use this.  Her blood pressure is elevated today at 148/66.  Heart rate is 67.  I will increase Toprol to 75 mg daily.  She requested an extra 25 mg tablet as she did not want to cut the 50 mg tablets to get this dose.  I encourage participation in cardiac rehab.  It iss not clear  she will participate given co-pays.  I also reviewed with her her most recent stress test that showed no ischemia  I provided a slip for reassessment of her lipids given her atorvastatin was increased.  If she has recurrent dizziness, despite taking the meclizine, I recommend she follow with her PCP.  I also encouraged hydration which she tells me she has been doing         Review of Systems   Constitutional: Negative for chills.   Cardiovascular:  Negative for chest pain, claudication, cyanosis, dyspnea on exertion, irregular heartbeat, leg swelling, near-syncope, orthopnea, palpitations, paroxysmal nocturnal dyspnea and syncope.   Respiratory:  Negative for cough and shortness of breath.    Gastrointestinal:  Negative for heartburn and nausea.   Neurological:  Negative for dizziness, focal weakness, headaches, light-headedness and weakness.   All other systems reviewed and are negative.            Assessment  Diagnoses and all orders for this visit:    Coronary artery disease involving native coronary artery of native heart without angina pectoris  -     metoprolol succinate (TOPROL-XL) 25 mg 24 hr tablet; Take 1 tablet (25 mg  total) by mouth daily  -     Ambulatory  Referral to Cardiac Rehabilitation; Future    Status post insertion of drug eluting coronary artery stent  -     Ambulatory  Referral to Cardiac Rehabilitation; Future    Benign hypertension  -     metoprolol succinate (TOPROL-XL) 25 mg 24 hr tablet; Take 1 tablet (25 mg total) by mouth daily    Mixed hyperlipidemia  -     Lipid panel; Future    Dizziness          Past Medical History:   Diagnosis Date   • Disease of thyroid gland    • High cholesterol    • Hypertension    • Vertigo        Past Surgical History:   Procedure Laterality Date   • CARDIAC CATHETERIZATION N/A 6/3/2024    Procedure: Cardiac pci;  Surgeon: Mele Carlson MD;  Location: [facility];  Service: Cardiology   • CARDIAC CATHETERIZATION N/A 6/3/2024    Procedure: Cardiac Coronary Angiogram;  Surgeon: Mele Carlson MD;  [facility];  Service: Cardiology           Allergies  Allergies   Allergen Reactions   • Sulfa Antibiotics Rash         Medications    Current Outpatient Medications:   •  Ascorbic Acid (vitamin C) 1000 MG tablet, Take 1,000 mg by mouth daily, Disp: , Rfl:   •  aspirin 81 mg chewable tablet, Chew 1 tablet (81 mg total) daily, Disp: 30 tablet, Rfl: 11  •  atorvastatin (LIPITOR) 80 mg tablet, Take 1 tablet (80 mg total) by mouth daily, Disp: 30 tablet, Rfl: 11  •  cholecalciferol (VITAMIN D3) 1,000 units tablet, Take 1,000 Units by mouth daily, Disp: , Rfl:   •  clopidogrel (PLAVIX) 75 mg tablet, Take 1 tablet (75 mg total) by mouth daily, Disp: 30 tablet, Rfl: 11  •  famotidine (PEPCID) 20 mg tablet, Take 1 tablet (20 mg total) by mouth daily for 5 days, Disp: 5 tablet, Rfl: 0  •  levothyroxine 50 mcg tablet, Take 50 mcg by mouth daily, Disp: , Rfl:   •  lidocaine (Lidoderm) 5 %, Apply 1 patch topically over 12 hours daily for 5 days Remove & Discard patch within 12 hours or as directed by MD, Disp: 5 patch, Rfl: 0  •  lisinopril (ZESTRIL) 10 mg tablet, Take 10  mg by mouth daily, Disp: , Rfl:   •  lisinopril-hydrochlorothiazide (PRINZIDE,ZESTORETIC) 20-12.5 MG per tablet, Take 1 tablet by mouth daily, Disp: , Rfl:   •  meclizine (ANTIVERT) 25 mg tablet, Take 25 mg by mouth 3 (three) times a day as needed, Disp: , Rfl:   •  metoprolol succinate (TOPROL-XL) 25 mg 24 hr tablet, Take 1 tablet (25 mg total) by mouth daily, Disp: 90 tablet, Rfl: 3  •  metoprolol succinate (TOPROL-XL) 50 mg 24 hr tablet, Take 1 tablet (50 mg total) by mouth daily, Disp: 30 tablet, Rfl: 11  •  Multiple Vitamins-Minerals (multivitamin with minerals) tablet, Take 1 tablet by mouth daily, Disp: , Rfl:   •  vitamin E 100 UNIT capsule, Take 100 Units by mouth daily, Disp: , Rfl:   •  sucralfate (CARAFATE) 1 g tablet, Take 1 tablet (1 g total) by mouth 4 (four) times a day for 7 days (Patient not taking: Reported on 7/11/2024), Disp: 28 tablet, Rfl: 0      Social History     Socioeconomic History   • Marital status: /Civil Union     Spouse name: Not on file   • Number of children: Not on file   • Years of education: Not on file   • Highest education level: Not on file   Occupational History   • Not on file   Tobacco Use   • Smoking status: Never   • Smokeless tobacco: Never   Vaping Use   • Vaping status: Not on file   Substance and Sexual Activity   • Alcohol use: Not Currently   • Drug use: Not Currently   • Sexual activity: Not on file   Other Topics Concern   • Not on file   Social History Narrative   • Not on file     Social Determinants of Health     Financial Resource Strain: Not on file   Food Insecurity: Not on file   Transportation Needs: Not on file   Physical Activity: Not on file   Stress: Not on file   Social Connections: Not on file   Intimate Partner Violence: Not on file   Housing Stability: Not on file       History reviewed. No pertinent family history.    Physical Exam  Vitals and nursing note reviewed.   Constitutional:       General: She is not in acute distress.      "Appearance: She is not diaphoretic.   HENT:      Head: Normocephalic and atraumatic.   Eyes:      Conjunctiva/sclera: Conjunctivae normal.   Cardiovascular:      Rate and Rhythm: Normal rate and regular rhythm.      Pulses: Intact distal pulses.      Heart sounds: Normal heart sounds.   Pulmonary:      Effort: Pulmonary effort is normal.      Breath sounds: Normal breath sounds.   Abdominal:      General: Bowel sounds are normal.      Palpations: Abdomen is soft.   Musculoskeletal:         General: Normal range of motion.      Cervical back: Normal range of motion and neck supple.      Comments: Significant edema and ecchymosis noted about the volar aspect of the right forearm.  Radial pulse is +2   Skin:     General: Skin is warm and dry.   Neurological:      Mental Status: She is alert and oriented to person, place, and time.         Vitals: Blood pressure 148/66, pulse 67, weight 63.6 kg (140 lb 3.2 oz), SpO2 98%.   Wt Readings from Last 3 Encounters:   07/11/24 63.6 kg (140 lb 3.2 oz)   06/12/24 61.2 kg (135 lb)   06/03/24 64.9 kg (143 lb)           Labs & Results:  Lab Results   Component Value Date    WBC 6.32 06/21/2024    HGB 13.1 06/21/2024    HCT 38.7 06/21/2024    MCV 92 06/21/2024     06/21/2024     BNP   Date Value Ref Range Status   06/21/2024 116 (H) 0 - 100 pg/mL Final   06/01/2024 72 0 - 100 pg/mL Final     No components found for: \"CHEM\"  No results found for: \"CKTOTAL\", \"TROPONINI\", \"TROPONINT\", \"CKMBINDEX\"  No results found for this or any previous visit.    No results found for this or any previous visit.    No valid procedures specified.  No results found for this or any previous visit.                    This note was completed in part utilizing [employer] direct voice recognition software.   Grammatical errors, random word insertion, spelling mistakes, and incomplete sentences may be an occasional consequence of the system secondary to software limitations, ambient noise and " hardware issues. At the time of dictation, efforts were made to edit, clarify and /or correct errors.  Please read the chart carefully and recognize, using context, where substitutions have occurred.  If you have any questions or concerns about the context, text or information contained within the body of this dictation, please contact myself, the provider, for further clarification

## 2024-07-19 ENCOUNTER — APPOINTMENT (OUTPATIENT)
Dept: LAB | Facility: CLINIC | Age: 64
End: 2024-07-19
Payer: COMMERCIAL

## 2024-07-19 ENCOUNTER — TRANSCRIBE ORDERS (OUTPATIENT)
Dept: LAB | Facility: CLINIC | Age: 64
End: 2024-07-19

## 2024-07-19 DIAGNOSIS — E78.5 HYPERLIPIDEMIA, UNSPECIFIED HYPERLIPIDEMIA TYPE: ICD-10-CM

## 2024-07-19 DIAGNOSIS — E78.5 HYPERLIPIDEMIA, UNSPECIFIED HYPERLIPIDEMIA TYPE: Primary | ICD-10-CM

## 2024-07-19 DIAGNOSIS — E78.2 MIXED HYPERLIPIDEMIA: ICD-10-CM

## 2024-07-19 LAB
ALT SERPL W P-5'-P-CCNC: 24 U/L (ref 7–52)
AST SERPL W P-5'-P-CCNC: 17 U/L (ref 13–39)
CHOLEST SERPL-MCNC: 116 MG/DL
HDLC SERPL-MCNC: 37 MG/DL
LDLC SERPL CALC-MCNC: 41 MG/DL (ref 0–100)
NONHDLC SERPL-MCNC: 79 MG/DL
TRIGL SERPL-MCNC: 192 MG/DL

## 2024-07-19 PROCEDURE — 84450 TRANSFERASE (AST) (SGOT): CPT

## 2024-07-19 PROCEDURE — 84460 ALANINE AMINO (ALT) (SGPT): CPT

## 2024-07-19 PROCEDURE — 36415 COLL VENOUS BLD VENIPUNCTURE: CPT

## 2024-07-19 PROCEDURE — 80061 LIPID PANEL: CPT

## 2024-08-01 ENCOUNTER — HOSPITAL ENCOUNTER (EMERGENCY)
Facility: HOSPITAL | Age: 64
Discharge: HOME | End: 2024-08-01
Attending: EMERGENCY MEDICINE
Payer: COMMERCIAL

## 2024-08-01 ENCOUNTER — APPOINTMENT (EMERGENCY)
Dept: VASCULAR ULTRASOUND | Facility: HOSPITAL | Age: 64
End: 2024-08-01
Payer: COMMERCIAL

## 2024-08-01 ENCOUNTER — APPOINTMENT (EMERGENCY)
Dept: CT IMAGING | Facility: HOSPITAL | Age: 64
End: 2024-08-01
Payer: COMMERCIAL

## 2024-08-01 ENCOUNTER — APPOINTMENT (EMERGENCY)
Dept: RADIOLOGY | Facility: HOSPITAL | Age: 64
End: 2024-08-01
Payer: COMMERCIAL

## 2024-08-01 VITALS
DIASTOLIC BLOOD PRESSURE: 65 MMHG | HEART RATE: 66 BPM | RESPIRATION RATE: 16 BRPM | BODY MASS INDEX: 25.4 KG/M2 | OXYGEN SATURATION: 98 % | TEMPERATURE: 98.1 F | WEIGHT: 138.89 LBS | SYSTOLIC BLOOD PRESSURE: 131 MMHG

## 2024-08-01 DIAGNOSIS — M79.604 RIGHT LEG PAIN: Primary | ICD-10-CM

## 2024-08-01 DIAGNOSIS — M79.669 CALF PAIN: ICD-10-CM

## 2024-08-01 LAB
ALBUMIN SERPL BCG-MCNC: 4.5 G/DL (ref 3.5–5)
ALP SERPL-CCNC: 56 U/L (ref 34–104)
ALT SERPL W P-5'-P-CCNC: 23 U/L (ref 7–52)
ANION GAP SERPL CALCULATED.3IONS-SCNC: 7 MMOL/L (ref 4–13)
AST SERPL W P-5'-P-CCNC: 15 U/L (ref 13–39)
BASOPHILS # BLD AUTO: 0.06 THOUSANDS/ÂΜL (ref 0–0.1)
BASOPHILS NFR BLD AUTO: 1 % (ref 0–1)
BILIRUB SERPL-MCNC: 0.79 MG/DL (ref 0.2–1)
BUN SERPL-MCNC: 12 MG/DL (ref 5–25)
CALCIUM SERPL-MCNC: 9.8 MG/DL (ref 8.4–10.2)
CHLORIDE SERPL-SCNC: 101 MMOL/L (ref 96–108)
CK SERPL-CCNC: 84 U/L (ref 26–192)
CO2 SERPL-SCNC: 28 MMOL/L (ref 21–32)
CREAT SERPL-MCNC: 0.7 MG/DL (ref 0.6–1.3)
EOSINOPHIL # BLD AUTO: 0.22 THOUSAND/ÂΜL (ref 0–0.61)
EOSINOPHIL NFR BLD AUTO: 3 % (ref 0–6)
ERYTHROCYTE [DISTWIDTH] IN BLOOD BY AUTOMATED COUNT: 12.1 % (ref 11.6–15.1)
GFR SERPL CREATININE-BSD FRML MDRD: 92 ML/MIN/1.73SQ M
GLUCOSE SERPL-MCNC: 94 MG/DL (ref 65–140)
HCT VFR BLD AUTO: 37.1 % (ref 34.8–46.1)
HGB BLD-MCNC: 12.4 G/DL (ref 11.5–15.4)
IMM GRANULOCYTES # BLD AUTO: 0.02 THOUSAND/UL (ref 0–0.2)
IMM GRANULOCYTES NFR BLD AUTO: 0 % (ref 0–2)
INR PPP: 1.07 (ref 0.85–1.19)
LACTATE SERPL-SCNC: 0.9 MMOL/L (ref 0.5–2)
LYMPHOCYTES # BLD AUTO: 1.5 THOUSANDS/ÂΜL (ref 0.6–4.47)
LYMPHOCYTES NFR BLD AUTO: 19 % (ref 14–44)
MCH RBC QN AUTO: 31.2 PG (ref 26.8–34.3)
MCHC RBC AUTO-ENTMCNC: 33.4 G/DL (ref 31.4–37.4)
MCV RBC AUTO: 93 FL (ref 82–98)
MONOCYTES # BLD AUTO: 0.96 THOUSAND/ÂΜL (ref 0.17–1.22)
MONOCYTES NFR BLD AUTO: 12 % (ref 4–12)
NEUTROPHILS # BLD AUTO: 5.15 THOUSANDS/ÂΜL (ref 1.85–7.62)
NEUTS SEG NFR BLD AUTO: 65 % (ref 43–75)
NRBC BLD AUTO-RTO: 0 /100 WBCS
PLATELET # BLD AUTO: 248 THOUSANDS/UL (ref 149–390)
PMV BLD AUTO: 9.4 FL (ref 8.9–12.7)
POTASSIUM SERPL-SCNC: 4.1 MMOL/L (ref 3.5–5.3)
PROT SERPL-MCNC: 7.9 G/DL (ref 6.4–8.4)
PROTHROMBIN TIME: 14.6 SECONDS (ref 12.3–15)
RBC # BLD AUTO: 3.98 MILLION/UL (ref 3.81–5.12)
SODIUM SERPL-SCNC: 136 MMOL/L (ref 135–147)
WBC # BLD AUTO: 7.91 THOUSAND/UL (ref 4.31–10.16)

## 2024-08-01 PROCEDURE — 99284 EMERGENCY DEPT VISIT MOD MDM: CPT

## 2024-08-01 PROCEDURE — 96374 THER/PROPH/DIAG INJ IV PUSH: CPT

## 2024-08-01 PROCEDURE — 83605 ASSAY OF LACTIC ACID: CPT | Performed by: EMERGENCY MEDICINE

## 2024-08-01 PROCEDURE — 93971 EXTREMITY STUDY: CPT

## 2024-08-01 PROCEDURE — 73706 CT ANGIO LWR EXTR W/O&W/DYE: CPT

## 2024-08-01 PROCEDURE — 82550 ASSAY OF CK (CPK): CPT | Performed by: EMERGENCY MEDICINE

## 2024-08-01 PROCEDURE — 99285 EMERGENCY DEPT VISIT HI MDM: CPT | Performed by: EMERGENCY MEDICINE

## 2024-08-01 PROCEDURE — 73564 X-RAY EXAM KNEE 4 OR MORE: CPT

## 2024-08-01 PROCEDURE — 85610 PROTHROMBIN TIME: CPT | Performed by: EMERGENCY MEDICINE

## 2024-08-01 PROCEDURE — 73590 X-RAY EXAM OF LOWER LEG: CPT

## 2024-08-01 PROCEDURE — 80053 COMPREHEN METABOLIC PANEL: CPT | Performed by: EMERGENCY MEDICINE

## 2024-08-01 PROCEDURE — 73552 X-RAY EXAM OF FEMUR 2/>: CPT

## 2024-08-01 PROCEDURE — 85025 COMPLETE CBC W/AUTO DIFF WBC: CPT | Performed by: EMERGENCY MEDICINE

## 2024-08-01 PROCEDURE — 36415 COLL VENOUS BLD VENIPUNCTURE: CPT | Performed by: EMERGENCY MEDICINE

## 2024-08-01 PROCEDURE — 93971 EXTREMITY STUDY: CPT | Performed by: SURGERY

## 2024-08-01 RX ORDER — KETOROLAC TROMETHAMINE 30 MG/ML
15 INJECTION, SOLUTION INTRAMUSCULAR; INTRAVENOUS ONCE
Status: DISCONTINUED | OUTPATIENT
Start: 2024-08-01 | End: 2024-08-01

## 2024-08-01 RX ORDER — METHOCARBAMOL 500 MG/1
500 TABLET, FILM COATED ORAL 2 TIMES DAILY
Qty: 20 TABLET | Refills: 0 | Status: SHIPPED | OUTPATIENT
Start: 2024-08-01

## 2024-08-01 RX ORDER — LIDOCAINE 50 MG/G
1 PATCH TOPICAL DAILY
Qty: 7 PATCH | Refills: 0 | Status: SHIPPED | OUTPATIENT
Start: 2024-08-01 | End: 2024-08-08

## 2024-08-01 RX ORDER — ACETAMINOPHEN 325 MG/1
975 TABLET ORAL ONCE
Status: COMPLETED | OUTPATIENT
Start: 2024-08-01 | End: 2024-08-01

## 2024-08-01 RX ORDER — METHOCARBAMOL 500 MG/1
500 TABLET, FILM COATED ORAL ONCE
Status: COMPLETED | OUTPATIENT
Start: 2024-08-01 | End: 2024-08-01

## 2024-08-01 RX ORDER — ACETAMINOPHEN 500 MG
500 TABLET ORAL EVERY 6 HOURS PRN
Qty: 14 TABLET | Refills: 0 | Status: SHIPPED | OUTPATIENT
Start: 2024-08-01 | End: 2024-08-08

## 2024-08-01 RX ORDER — MORPHINE SULFATE 4 MG/ML
4 INJECTION, SOLUTION INTRAMUSCULAR; INTRAVENOUS ONCE
Status: COMPLETED | OUTPATIENT
Start: 2024-08-01 | End: 2024-08-01

## 2024-08-01 RX ADMIN — MORPHINE SULFATE 4 MG: 4 INJECTION INTRAVENOUS at 14:14

## 2024-08-01 RX ADMIN — METHOCARBAMOL 500 MG: 500 TABLET ORAL at 10:53

## 2024-08-01 RX ADMIN — IOHEXOL 100 ML: 350 INJECTION, SOLUTION INTRAVENOUS at 13:24

## 2024-08-01 RX ADMIN — ACETAMINOPHEN 975 MG: 325 TABLET, FILM COATED ORAL at 10:53

## 2024-08-01 NOTE — ED PROVIDER NOTES
History  Chief Complaint   Patient presents with    Leg Pain     RLE started yesterday. Behind R knee and radiates to calf. No hx dvt. No injury/trauma.     Patient is a 64-year-old female with prior medical history as listed presents with 1 day of right leg pain mostly in the calf area.  Reports cramping.  Worse with movement.  Better with rest.  Denies any recent travels.  No surgeries.  No skin discoloration of the leg.  No chest pain or shortness of breath.  No trauma.  No recent changes to the medications.      History provided by:  Patient  Leg Pain  Associated symptoms: no back pain, no fever, no numbness and no stiffness        Prior to Admission Medications   Prescriptions Last Dose Informant Patient Reported? Taking?   Ascorbic Acid (vitamin C) 1000 MG tablet  Self Yes No   Sig: Take 1,000 mg by mouth daily   Multiple Vitamins-Minerals (multivitamin with minerals) tablet  Self Yes No   Sig: Take 1 tablet by mouth daily   aspirin 81 mg chewable tablet  Self No No   Sig: Chew 1 tablet (81 mg total) daily   atorvastatin (LIPITOR) 80 mg tablet  Self No No   Sig: Take 1 tablet (80 mg total) by mouth daily   cholecalciferol (VITAMIN D3) 1,000 units tablet  Self Yes No   Sig: Take 1,000 Units by mouth daily   clopidogrel (PLAVIX) 75 mg tablet  Self No No   Sig: Take 1 tablet (75 mg total) by mouth daily   famotidine (PEPCID) 20 mg tablet  Self No No   Sig: Take 1 tablet (20 mg total) by mouth daily for 5 days   levothyroxine 50 mcg tablet  Self Yes No   Sig: Take 50 mcg by mouth daily   lidocaine (Lidoderm) 5 %  Self No No   Sig: Apply 1 patch topically over 12 hours daily for 5 days Remove & Discard patch within 12 hours or as directed by MD   lisinopril (ZESTRIL) 10 mg tablet  Self Yes No   Sig: Take 10 mg by mouth daily   lisinopril-hydrochlorothiazide (PRINZIDE,ZESTORETIC) 20-12.5 MG per tablet  Self Yes No   Sig: Take 1 tablet by mouth daily   meclizine (ANTIVERT) 25 mg tablet  Self Yes No   Sig: Take 25 mg  by mouth 3 (three) times a day as needed   metoprolol succinate (TOPROL-XL) 25 mg 24 hr tablet   No No   Sig: Take 1 tablet (25 mg total) by mouth daily   metoprolol succinate (TOPROL-XL) 50 mg 24 hr tablet  Self No No   Sig: Take 1 tablet (50 mg total) by mouth daily   sucralfate (CARAFATE) 1 g tablet  Self No No   Sig: Take 1 tablet (1 g total) by mouth 4 (four) times a day for 7 days   Patient not taking: Reported on 7/11/2024   vitamin E 100 UNIT capsule  Self Yes No   Sig: Take 100 Units by mouth daily      Facility-Administered Medications: None       Past Medical History:   Diagnosis Date    Disease of thyroid gland     High cholesterol     Hypertension     Vertigo        Past Surgical History:   Procedure Laterality Date    CARDIAC CATHETERIZATION N/A 6/3/2024    Procedure: Cardiac pci;  Surgeon: Mele Carlson MD;  Location: [facility];  Service: Cardiology    CARDIAC CATHETERIZATION N/A 6/3/2024    Procedure: Cardiac Coronary Angiogram;  Surgeon: Mele Carlson MD;  Location: [facility];  Service: Cardiology       History reviewed. No pertinent family history.  I have reviewed and agree with the history as documented.    E-Cigarette/Vaping    E-Cigarette Use Never Assessed      E-Cigarette/Vaping Substances    Nicotine No     THC No     CBD No     Flavoring No     Other No     Unknown No      Social History     Tobacco Use    Smoking status: Never    Smokeless tobacco: Never   Substance Use Topics    Alcohol use: Not Currently    Drug use: Not Currently       Review of Systems   Constitutional:  Negative for activity change, chills and fever.   HENT:  Negative for congestion, drooling, ear pain, mouth sores and sore throat.    Eyes:  Negative for pain and visual disturbance.   Respiratory:  Negative for apnea, cough, chest tightness and shortness of breath.    Cardiovascular:  Negative for chest pain and palpitations.   Gastrointestinal:  Negative for abdominal pain and vomiting.    Endocrine: Negative for cold intolerance, heat intolerance and polydipsia.   Genitourinary:  Negative for difficulty urinating, dysuria and hematuria.   Musculoskeletal:  Negative for arthralgias, back pain and stiffness.   Skin:  Negative for color change and rash.   Allergic/Immunologic: Negative for environmental allergies and food allergies.   Neurological:  Negative for dizziness, seizures, syncope, facial asymmetry and headaches.   Hematological:  Negative for adenopathy.   Psychiatric/Behavioral:  Negative for agitation, confusion, decreased concentration and dysphoric mood. The patient is not hyperactive.    All other systems reviewed and are negative.      Physical Exam  Physical Exam  Constitutional:       General: She is not in acute distress.  HENT:      Head: Normocephalic.      Nose: Nose normal. No congestion.      Mouth/Throat:      Mouth: Mucous membranes are moist.   Eyes:      Extraocular Movements: Extraocular movements intact.      Pupils: Pupils are equal, round, and reactive to light.   Cardiovascular:      Rate and Rhythm: Normal rate.      Pulses: Normal pulses.      Heart sounds: Normal heart sounds.   Pulmonary:      Effort: Pulmonary effort is normal.      Breath sounds: Normal breath sounds.   Abdominal:      Palpations: Abdomen is soft.   Musculoskeletal:         General: Tenderness present. No swelling or deformity. Normal range of motion.      Cervical back: Normal range of motion and neck supple. No rigidity or tenderness.      Comments: Tenderness to palpation of the lateral aspect of the right leg and calf area.  Distal pulses intact.  No limb swelling.  No skin color changes.  Good perfusion of the extremity.  Good cap refill.  Dorsalis pedis and posterior tibialis pulses are intact.  Full range of motion of the painful at the knee.  No knee swelling.  No streaking.  No swelling compared to the other extremity.  Tenderness to palpation in the calf area.   Skin:     General: Skin  is warm.      Capillary Refill: Capillary refill takes less than 2 seconds.   Neurological:      General: No focal deficit present.      Mental Status: She is alert.   Psychiatric:         Mood and Affect: Mood normal.         Vital Signs  ED Triage Vitals [08/01/24 0944]   Temperature Pulse Respirations Blood Pressure SpO2   98.1 °F (36.7 °C) 66 16 131/65 98 %      Temp Source Heart Rate Source Patient Position - Orthostatic VS BP Location FiO2 (%)   Oral Monitor Sitting Right arm --      Pain Score       8           Vitals:    08/01/24 0944   BP: 131/65   Pulse: 66   Patient Position - Orthostatic VS: Sitting         Visual Acuity      ED Medications  Medications   methocarbamol (ROBAXIN) tablet 500 mg (500 mg Oral Given 8/1/24 1053)   acetaminophen (TYLENOL) tablet 975 mg (975 mg Oral Given 8/1/24 1053)   iohexol (OMNIPAQUE) 350 MG/ML injection (MULTI-DOSE) 100 mL (100 mL Intravenous Given 8/1/24 1324)   morphine injection 4 mg (4 mg Intravenous Given 8/1/24 1414)       Diagnostic Studies  Results Reviewed       Procedure Component Value Units Date/Time    Lactic acid, plasma (w/reflex if result > 2.0) [[id number]]  (Normal) Collected: 08/01/24 1230    Lab Status: Final result Specimen: Blood from Arm, Right Updated: 08/01/24 1353     LACTIC ACID 0.9 mmol/L     Narrative:      Result may be elevated if tourniquet was used during collection.    Protime-INR [[id number]]  (Normal) Collected: 08/01/24 1140    Lab Status: Final result Specimen: Blood from Arm, Right Updated: 08/01/24 1156     Protime 14.6 seconds      INR 1.07    Narrative:      INR Therapeutic Range    Indication                                             INR Range      Atrial Fibrillation                                               2.0-3.0  Hypercoagulable State                                    2.0.2.3  Left Ventricular Asist Device                            2.0-3.0  Mechanical Heart Valve                                  -    Aortic(with afib,  MI, embolism, HF, LA enlargement,    and/or coagulopathy)                                     2.0-3.0 (2.5-3.5)     Mitral                                                             2.5-3.5  Prosthetic/Bioprosthetic Heart Valve               2.0-3.0  Venous thromboembolism (VTE: VT, PE        2.0-3.0    Comprehensive metabolic panel [[id number]] Collected: 08/01/24 1058    Lab Status: Final result Specimen: Blood from Arm, Right Updated: 08/01/24 1126     Sodium 136 mmol/L      Potassium 4.1 mmol/L      Chloride 101 mmol/L      CO2 28 mmol/L      ANION GAP 7 mmol/L      BUN 12 mg/dL      Creatinine 0.70 mg/dL      Glucose 94 mg/dL      Calcium 9.8 mg/dL      AST 15 U/L      ALT 23 U/L      Alkaline Phosphatase 56 U/L      Total Protein 7.9 g/dL      Albumin 4.5 g/dL      Total Bilirubin 0.79 mg/dL      eGFR 92 ml/min/1.73sq m     Narrative:      [employer] guidelines for Chronic Kidney Disease (CKD):     Stage 1 with normal or high GFR (GFR > 90 mL/min/1.73 square meters)    Stage 2 Mild CKD (GFR = 60-89 mL/min/1.73 square meters)    Stage 3A Moderate CKD (GFR = 45-59 mL/min/1.73 square meters)    Stage 3B Moderate CKD (GFR = 30-44 mL/min/1.73 square meters)    Stage 4 Severe CKD (GFR = 15-29 mL/min/1.73 square meters)    Stage 5 End Stage CKD (GFR <15 mL/min/1.73 square meters)  Note: GFR calculation is accurate only with a steady state creatinine    CK [[id number]]  (Normal) Collected: 08/01/24 1058    Lab Status: Final result Specimen: Blood from Arm, Right Updated: 08/01/24 1126     Total CK 84 U/L     CBC and differential [[id number]] Collected: 08/01/24 1058    Lab Status: Final result Specimen: Blood from Arm, Right Updated: 08/01/24 1110     WBC 7.91 Thousand/uL      RBC 3.98 Million/uL      Hemoglobin 12.4 g/dL      Hematocrit 37.1 %      MCV 93 fL      MCH 31.2 pg      MCHC 33.4 g/dL      RDW 12.1 %      MPV 9.4 fL      Platelets 248 Thousands/uL      nRBC 0 /100 WBCs      Segmented %  65 %      Immature Grans % 0 %      Lymphocytes % 19 %      Monocytes % 12 %      Eosinophils Relative 3 %      Basophils Relative 1 %      Absolute Neutrophils 5.15 Thousands/µL      Absolute Immature Grans 0.02 Thousand/uL      Absolute Lymphocytes 1.50 Thousands/µL      Absolute Monocytes 0.96 Thousand/µL      Eosinophils Absolute 0.22 Thousand/µL      Basophils Absolute 0.06 Thousands/µL                    CTA lower extremity right w wo contrast   Final Result by Walter Anguiano MD (08/01 1346)      CT angiogram of the bilateral lower extremities with minimal atherosclerotic disease.            Workstation performed: [id number]         XR femur 2 views RIGHT   Final Result by Sony Donohue MD (08/01 1147)      No acute osseous abnormality.         Computerized Assisted Algorithm (CAA) may have been used to analyze all applicable images.         Workstation performed: [id number]         XR tibia fibula 2 views RIGHT   Final Result by Sony Donohue MD (08/01 1147)      No acute osseous abnormality.            Computerized Assisted Algorithm (CAA) may have been used to analyze all applicable images.               Workstation performed: [id number]         XR knee 4+ vw right injury   Final Result by Sony Donohue MD (08/01 1149)      No acute osseous abnormality.         Computerized Assisted Algorithm (CAA) may have been used to analyze all applicable images.         Workstation performed: [id number]         VAS lower limb venous duplex study, unilateral/limited    (Results Pending)              Procedures  Procedures         ED Course  ED Course as of 08/01/24 1433   u Aug 01, 2024   1023 VAS lower limb venous duplex study, unilateral/limited         Patient is a 64-year-old female with prior medical history as listed presents for evaluation of right calf and popliteal area pain.  Worse with movement.  Better with rest.  I completed an extensive workup of her pain in the  emergency department including ultrasound to rule out a DVT.  X-rays to rule out pathologic fractures or bone tumor.  Labs including CK to rule out rhabdo myelosis.  CTA was obtained to rule out arterial occlusion or dissection.  There is no evidence of dissection or severe arterial occlusion in extremity.  It is well-perfused.  Pulses are intact.  Patient's pain is reproducible on exam in the calf area and popliteal fossa.  Plan is to place the patient on muscle relaxer, recommend Lidoderm patches, Tylenol.  Extensive return precautions provided to the patient.  Patient agreed with the plan.  She will come back to the ER immediately if her pain gets worse or if it is not better within 48 hours.                                      Medical Decision Making  Patient is a 64-year-old female with prior medical history as listed presents for evaluation of right calf and popliteal area pain.  Worse with movement.  Better with rest.  I completed an extensive workup of her pain in the emergency department including ultrasound to rule out a DVT.  X-rays to rule out pathologic fractures or bone tumor.  Labs including CK to rule out rhabdo myelosis.  CTA was obtained to rule out arterial occlusion or dissection.  There is no evidence of dissection or severe arterial occlusion in extremity.  It is well-perfused.  Pulses are intact.  Patient's pain is reproducible on exam in the calf area and popliteal fossa.  Plan is to place the patient on muscle relaxer, recommend Lidoderm patches, Tylenol.  Extensive return precautions provided to the patient.  Patient agreed with the plan.  She will come back to the ER immediately if her pain gets worse or if it is not better within 48 hours.    Amount and/or Complexity of Data Reviewed  External Data Reviewed: labs and radiology.  Labs: ordered.  Radiology: ordered. Decision-making details documented in ED Course.    Risk  OTC drugs.  Prescription drug management.                  Disposition  Final diagnoses:   Right leg pain   Calf pain     Time reflects when diagnosis was documented in both MDM as applicable and the Disposition within this note       Time User Action Codes Description Comment    8/1/2024  2:27 PM Milton Javier [M79.604] Right leg pain     8/1/2024  2:27 PM Milton Javier [M79.669] Calf pain           ED Disposition       ED Disposition   Discharge    Condition   Stable    Date/Time   Thu Aug 1, 2024  2:27 PM    Comment   Siena Olmedo discharge to home/self care.                   Follow-up Information       Follow up With Specialties Details Why Contact Info    Monty Mora MD Internal Medicine Schedule an appointment as soon as possible for a visit in 1 day  [address] [place] [address]  [phone]              Patient's Medications   Discharge Prescriptions    ACETAMINOPHEN (TYLENOL) 500 MG TABLET    Take 1 tablet (500 mg total) by mouth every 6 (six) hours as needed for mild pain for up to 7 days       Start Date: 8/1/2024  End Date: 8/8/2024       Order Dose: 500 mg       Quantity: 14 tablet    Refills: 0    LIDOCAINE (LIDODERM) 5 %    Apply 1 patch topically over 12 hours daily for 7 days Remove & Discard patch within 12 hours or as directed by MD       Start Date: 8/1/2024  End Date: 8/8/2024       Order Dose: 1 patch       Quantity: 7 patch    Refills: 0    METHOCARBAMOL (ROBAXIN) 500 MG TABLET    Take 1 tablet (500 mg total) by mouth 2 (two) times a day       Start Date: 8/1/2024  End Date: --       Order Dose: 500 mg       Quantity: 20 tablet    Refills: 0       No discharge procedures on file.    PDMP Review       None            ED Provider  Electronically Signed by             Milton Javier DO  08/01/24 5668

## 2024-08-22 ENCOUNTER — OFFICE VISIT (OUTPATIENT)
Dept: CARDIOLOGY CLINIC | Facility: CLINIC | Age: 64
End: 2024-08-22
Payer: COMMERCIAL

## 2024-08-22 VITALS
DIASTOLIC BLOOD PRESSURE: 64 MMHG | SYSTOLIC BLOOD PRESSURE: 118 MMHG | OXYGEN SATURATION: 99 % | WEIGHT: 138.8 LBS | HEART RATE: 59 BPM | BODY MASS INDEX: 25.39 KG/M2

## 2024-08-22 DIAGNOSIS — R42 DIZZINESS: ICD-10-CM

## 2024-08-22 DIAGNOSIS — I25.10 CORONARY ARTERY DISEASE INVOLVING NATIVE CORONARY ARTERY OF NATIVE HEART WITHOUT ANGINA PECTORIS: Primary | ICD-10-CM

## 2024-08-22 DIAGNOSIS — I10 BENIGN HYPERTENSION: ICD-10-CM

## 2024-08-22 DIAGNOSIS — E78.2 MIXED HYPERLIPIDEMIA: ICD-10-CM

## 2024-08-22 DIAGNOSIS — R42 LIGHTHEADEDNESS: ICD-10-CM

## 2024-08-22 PROCEDURE — 99214 OFFICE O/P EST MOD 30 MIN: CPT | Performed by: INTERNAL MEDICINE

## 2024-08-22 NOTE — PROGRESS NOTES
Cardiology Follow Up    Siena Nesbittnington  1960  [id number]  [facility]  [address] [facility] [address]  [address]  [place] [address] [phone]      1. Coronary artery disease involving native coronary artery of native heart without angina pectoris  Assessment & Plan:  Found in the setting of non-ST elevation myocardial infarction in June 2024.  Echocardiogram at the time revealed an ejection fraction of 50% with wall motion abnormalities in the distribution of the RCA.  Cardiac catheterization revealed:    Long Prox -  Mid RCA lesion - 80 % culprit lesion--> AKASH    Mid LAD lesion is 70% stenosed.    1st Mrg lesion is 90% stenosed. Very small vessel.    RPDA lesion is 80% stenosed. Very small vessel.    Currently asymptomatic and doing well on medical regimen including aspirin, Plavix, statin, and beta-blocker.  Status post nuclear stress testing in July 2024 revealing no ischemic changes.  Will continue to keep a close eye on CAD and development of symptoms.  2. Dizziness  -     Ambulatory Referral to Cardiology  3. Lightheadedness  -     Ambulatory Referral to Cardiology  4. Benign hypertension  Assessment & Plan:  Very well-controlled currently on Toprol-XL, lisinopril, hydrochlorothiazide.  5. Mixed hyperlipidemia  Assessment & Plan:  Continued on Lipitor 80 mg daily, LDL 41 in July 2024.    Chief Complaint   Patient presents with    New Patient Visit     Referred by Dr. Bautista: 6-8 wk f/u per Dede     Shortness of Breath     On exertion        Interval History: Patient feels well, without complaints.  No reported chest pain, shortness of breath, palpitations, lightheadedness, syncope, LE edema, orthopnea, PND, or significant weight changes.  Patient remains active without any increased fatigue out of the ordinary.        Blood pressure 118/64, pulse 59, weight 63 kg (138 lb 12.8 oz), SpO2 99%.      Review of Systems:  Review of Systems   Constitutional:  Negative for  activity change, appetite change, chills, diaphoresis, fatigue and unexpected weight change.   HENT:  Negative for hearing loss, nosebleeds and sore throat.    Eyes:  Negative for photophobia and visual disturbance.   Respiratory:  Negative for cough, chest tightness, shortness of breath and wheezing.    Cardiovascular:  Negative for chest pain, palpitations and leg swelling.   Gastrointestinal:  Negative for abdominal pain, diarrhea, nausea and vomiting.   Endocrine: Negative for polyuria.   Genitourinary:  Negative for dysuria, frequency and hematuria.   Musculoskeletal:  Negative for arthralgias, back pain, gait problem and neck pain.   Skin:  Negative for pallor and rash.   Neurological:  Negative for dizziness, syncope and headaches.   Hematological:  Does not bruise/bleed easily.   Psychiatric/Behavioral:  Negative for behavioral problems and confusion.        Physical Exam:  Physical Exam  Vitals reviewed.   Constitutional:       Appearance: Normal appearance. She is well-developed. She is not ill-appearing or diaphoretic.   HENT:      Head: Normocephalic and atraumatic.      Nose: Nose normal.   Eyes:      General: No scleral icterus.     Extraocular Movements: Extraocular movements intact.      Pupils: Pupils are equal, round, and reactive to light.   Neck:      Vascular: No JVD.   Cardiovascular:      Rate and Rhythm: Normal rate and regular rhythm.      Heart sounds: Normal heart sounds. No murmur heard.     No friction rub. No gallop.   Pulmonary:      Effort: Pulmonary effort is normal. No respiratory distress.      Breath sounds: Normal breath sounds. No wheezing or rales.   Abdominal:      General: Bowel sounds are normal. There is no distension.      Palpations: Abdomen is soft.      Tenderness: There is no abdominal tenderness.   Musculoskeletal:         General: No deformity. Normal range of motion.      Cervical back: Normal range of motion and neck supple.      Right lower leg: No edema.       Left lower leg: No edema.   Skin:     General: Skin is warm and dry.      Findings: No rash.   Neurological:      Mental Status: She is alert and oriented to person, place, and time.      Cranial Nerves: No cranial nerve deficit.   Psychiatric:         Mood and Affect: Mood normal.         Behavior: Behavior normal.         Patient Active Problem List   Diagnosis    Syncope and collapse    Mixed hyperlipidemia    Benign hypertension    Carpal tunnel syndrome    Hypothyroidism    Nonalcoholic fatty liver disease    Vertigo    Dizziness    Chest pain    Hospital discharge follow-up    Coronary artery disease involving native coronary artery of native heart without angina pectoris    Status post insertion of drug eluting coronary artery stent     Past Medical History:   Diagnosis Date    Disease of thyroid gland     High cholesterol     Hypertension     Vertigo      Social History     Socioeconomic History    Marital status: /Civil Union     Spouse name: Not on file    Number of children: Not on file    Years of education: Not on file    Highest education level: Not on file   Occupational History    Not on file   Tobacco Use    Smoking status: Never    Smokeless tobacco: Never   Vaping Use    Vaping status: Not on file   Substance and Sexual Activity    Alcohol use: Not Currently    Drug use: Not Currently    Sexual activity: Not on file   Other Topics Concern    Not on file   Social History Narrative    Not on file     Social Determinants of Health     Financial Resource Strain: Not on file   Food Insecurity: Not on file   Transportation Needs: Not on file   Physical Activity: Not on file   Stress: Not on file   Social Connections: Not on file   Intimate Partner Violence: Not on file   Housing Stability: Not on file      History reviewed. No pertinent family history.  Past Surgical History:   Procedure Laterality Date    CARDIAC CATHETERIZATION N/A 6/3/2024    Procedure: Cardiac pci;  Surgeon: Mele Carlson  MD;  [facility];  Service: Cardiology    CARDIAC CATHETERIZATION N/A 6/3/2024    Procedure: Cardiac Coronary Angiogram;  Surgeon: Mele Carlson MD;  [facility];  Service: Cardiology       Current Outpatient Medications:     Ascorbic Acid (vitamin C) 1000 MG tablet, Take 1,000 mg by mouth daily, Disp: , Rfl:     aspirin 81 mg chewable tablet, Chew 1 tablet (81 mg total) daily, Disp: 30 tablet, Rfl: 11    atorvastatin (LIPITOR) 80 mg tablet, Take 1 tablet (80 mg total) by mouth daily, Disp: 30 tablet, Rfl: 11    cholecalciferol (VITAMIN D3) 1,000 units tablet, Take 1,000 Units by mouth daily, Disp: , Rfl:     clopidogrel (PLAVIX) 75 mg tablet, Take 1 tablet (75 mg total) by mouth daily, Disp: 30 tablet, Rfl: 11    famotidine (PEPCID) 20 mg tablet, Take 1 tablet (20 mg total) by mouth daily for 5 days, Disp: 5 tablet, Rfl: 0    levothyroxine 50 mcg tablet, Take 50 mcg by mouth daily, Disp: , Rfl:     lisinopril (ZESTRIL) 10 mg tablet, Take 10 mg by mouth daily, Disp: , Rfl:     lisinopril-hydrochlorothiazide (PRINZIDE,ZESTORETIC) 20-12.5 MG per tablet, Take 1 tablet by mouth daily, Disp: , Rfl:     meclizine (ANTIVERT) 25 mg tablet, Take 25 mg by mouth 3 (three) times a day as needed, Disp: , Rfl:     metoprolol succinate (TOPROL-XL) 25 mg 24 hr tablet, Take 1 tablet (25 mg total) by mouth daily, Disp: 90 tablet, Rfl: 3    metoprolol succinate (TOPROL-XL) 50 mg 24 hr tablet, Take 1 tablet (50 mg total) by mouth daily, Disp: 30 tablet, Rfl: 11    Multiple Vitamins-Minerals (multivitamin with minerals) tablet, Take 1 tablet by mouth daily, Disp: , Rfl:     vitamin E 100 UNIT capsule, Take 100 Units by mouth daily, Disp: , Rfl:     lidocaine (Lidoderm) 5 %, Apply 1 patch topically over 12 hours daily for 5 days Remove & Discard patch within 12 hours or as directed by MD (Patient not taking: Reported on 8/22/2024), Disp: 5 patch, Rfl: 0    lidocaine (Lidoderm) 5 %, Apply 1 patch  topically over 12 hours daily for 7 days Remove & Discard patch within 12 hours or as directed by MD (Patient not taking: Reported on 8/22/2024), Disp: 7 patch, Rfl: 0    methocarbamol (ROBAXIN) 500 mg tablet, Take 1 tablet (500 mg total) by mouth 2 (two) times a day (Patient not taking: Reported on 8/22/2024), Disp: 20 tablet, Rfl: 0    sucralfate (CARAFATE) 1 g tablet, Take 1 tablet (1 g total) by mouth 4 (four) times a day for 7 days (Patient not taking: Reported on 7/11/2024), Disp: 28 tablet, Rfl: 0  Allergies   Allergen Reactions    Sulfa Antibiotics Rash       Labs:  Admission on 08/01/2024, Discharged on 08/01/2024   Component Date Value    WBC 08/01/2024 7.91     RBC 08/01/2024 3.98     Hemoglobin 08/01/2024 12.4     Hematocrit 08/01/2024 37.1     MCV 08/01/2024 93     MCH 08/01/2024 31.2     MCHC 08/01/2024 33.4     RDW 08/01/2024 12.1     MPV 08/01/2024 9.4     Platelets 08/01/2024 248     nRBC 08/01/2024 0     Segmented % 08/01/2024 65     Immature Grans % 08/01/2024 0     Lymphocytes % 08/01/2024 19     Monocytes % 08/01/2024 12     Eosinophils Relative 08/01/2024 3     Basophils Relative 08/01/2024 1     Absolute Neutrophils 08/01/2024 5.15     Absolute Immature Grans 08/01/2024 0.02     Absolute Lymphocytes 08/01/2024 1.50     Absolute Monocytes 08/01/2024 0.96     Eosinophils Absolute 08/01/2024 0.22     Basophils Absolute 08/01/2024 0.06     Sodium 08/01/2024 136     Potassium 08/01/2024 4.1     Chloride 08/01/2024 101     CO2 08/01/2024 28     ANION GAP 08/01/2024 7     BUN 08/01/2024 12     Creatinine 08/01/2024 0.70     Glucose 08/01/2024 94     Calcium 08/01/2024 9.8     AST 08/01/2024 15     ALT 08/01/2024 23     Alkaline Phosphatase 08/01/2024 56     Total Protein 08/01/2024 7.9     Albumin 08/01/2024 4.5     Total Bilirubin 08/01/2024 0.79     eGFR 08/01/2024 92     Protime 08/01/2024 14.6     INR 08/01/2024 1.07     Total CK 08/01/2024 84     LACTIC ACID 08/01/2024 0.9    Appointment on  07/19/2024   Component Date Value    Cholesterol 07/19/2024 116     Triglycerides 07/19/2024 192 (H)     HDL, Direct 07/19/2024 37 (L)     LDL Calculated 07/19/2024 41     Non-HDL-Chol (CHOL-HDL) 07/19/2024 79     AST 07/19/2024 17     ALT 07/19/2024 24    Hospital Outpatient Visit on 07/01/2024   Component Date Value    Rest Nuclear Isotope Dose 07/01/2024 10.40     Stress Nuclear Isotope D* 07/01/2024 32.10     Baseline HR 07/01/2024 54     Baseline BP 07/01/2024 144/64     O2 sat rest 07/01/2024 98     Stress peak HR 07/01/2024 107     Post peak BP 07/01/2024 2     O2 sat peak 07/01/2024 97     Recovery HR 07/01/2024 83     Recovery BP 07/01/2024 142/84     O2 sat recovery 07/01/2024 99     Rate Pressure Product 07/01/2024 214.0     Angina Index 07/01/2024 0     EF (%) 07/01/2024 62     Protocol Name 07/01/2024 [facility]     Exercise duration (min) 07/01/2024 3     Exercise duration (sec) 07/01/2024 0     Post Peak Systolic BP 07/01/2024 144     Max Diastolic Bp 07/01/2024 65     Peak HR 07/01/2024 107     Max Predicted Heart Rate 07/01/2024 157     Reason for Termination 07/01/2024 Protocol Complete     Test Indication 07/01/2024 CAD     Target Hr Formular 07/01/2024 (220 - Age)*100%     Chest Pain Statement 07/01/2024 none    Admission on 06/21/2024, Discharged on 06/21/2024   Component Date Value    Ventricular Rate 06/21/2024 63     Atrial Rate 06/21/2024 63     TX Interval 06/21/2024 136     QRSD Interval 06/21/2024 76     QT Interval 06/21/2024 414     QTC Interval 06/21/2024 423     P Axis 06/21/2024 49     QRS Axis 06/21/2024 3     T Wave [place] 06/21/2024 -28     WBC 06/21/2024 6.32     RBC 06/21/2024 4.19     Hemoglobin 06/21/2024 13.1     Hematocrit 06/21/2024 38.7     MCV 06/21/2024 92     MCH 06/21/2024 31.3     MCHC 06/21/2024 33.9     RDW 06/21/2024 11.9     MPV 06/21/2024 9.2     Platelets 06/21/2024 358     nRBC 06/21/2024 0     Segmented % 06/21/2024 67     Immature Grans % 06/21/2024 0      Lymphocytes % 06/21/2024 20     Monocytes % 06/21/2024 10     Eosinophils Relative 06/21/2024 2     Basophils Relative 06/21/2024 1     Absolute Neutrophils 06/21/2024 4.16     Absolute Immature Grans 06/21/2024 0.01     Absolute Lymphocytes 06/21/2024 1.29     Absolute Monocytes 06/21/2024 0.66     Eosinophils Absolute 06/21/2024 0.15     Basophils Absolute 06/21/2024 0.05     Sodium 06/21/2024 136     Potassium 06/21/2024 4.0     Chloride 06/21/2024 99     CO2 06/21/2024 29     ANION GAP 06/21/2024 8     BUN 06/21/2024 20     Creatinine 06/21/2024 1.01     Glucose 06/21/2024 91     Calcium 06/21/2024 10.2     AST 06/21/2024 18     ALT 06/21/2024 28     Alkaline Phosphatase 06/21/2024 53     Total Protein 06/21/2024 8.2     Albumin 06/21/2024 4.6     Total Bilirubin 06/21/2024 0.70     eGFR 06/21/2024 59     hs TnI 0hr 06/21/2024 7     hs TnI 2hr 06/21/2024 6     Delta 2hr hsTnI 06/21/2024 -1     BNP 06/21/2024 116 (H)    Appointment on 06/12/2024   Component Date Value    Sodium 06/12/2024 138     Potassium 06/12/2024 3.5     Chloride 06/12/2024 101     CO2 06/12/2024 29     ANION GAP 06/12/2024 8     BUN 06/12/2024 12     Creatinine 06/12/2024 0.62     Glucose 06/12/2024 123     Calcium 06/12/2024 10.2     eGFR 06/12/2024 96     WBC 06/12/2024 7.55     RBC 06/12/2024 4.07     Hemoglobin 06/12/2024 12.7     Hematocrit 06/12/2024 37.6     MCV 06/12/2024 92     MCH 06/12/2024 31.2     MCHC 06/12/2024 33.8     RDW 06/12/2024 11.9     Platelets 06/12/2024 395 (H)     MPV 06/12/2024 9.8    No results displayed because visit has over 200 results.        Lab Results   Component Value Date    TRIG 192 (H) 07/19/2024    HDL 37 (L) 07/19/2024     Imaging: VAS lower limb venous duplex study, unilateral/limited    Result Date: 8/1/2024  Narrative:  [facility] REPORT [facility]INICAL: Indications: Patient presents with right lower extremity pain x 2 days. Operative History: 2022-06-03 Cardiac catheterization Risk Factors The  patient has history of HTN, Hyperlipidemia and CAD.   CONCLUSION:  Impression: RIGHT LOWER LIMB No evidence of acute or chronic deep vein thrombosis . No evidence of superficial thrombophlebitis noted. Doppler evaluation shows a normal response to augmentation maneuvers. Popliteal, posterior tibial and anterior tibial arterial Doppler waveform's are triphasic/biphasic.  LEFT LOWER LIMB LIMITED Evaluation shows no evidence of thrombus in the common femoral vein. Doppler evaluation shows a normal response to augmentation maneuvers.  Technical findings were given to Milton Javier MD via EPIC secure chat.  SIGNATURE: Electronically Signed by: ALIRIO ELDRIDGE MD on 2024-08-01 04:05:28 PM    CTA lower extremity right w wo contrast    Result Date: 8/1/2024  Narrative: CT ARTERIOGRAM OF THE BILATERAL LOWER EXTREMITY(IES) WITHOUT AND WITH IV CONTRAST INDICATION:  R leg pain. R/o arterial occlusion COMPARISON: None. TECHNIQUE:  CT angiogram examination of the bilateral lower extremity(ies) was performed according to standard protocol with intravenous contrast.  This examination, like all CT scans performed in the [facility], was performed utilizing techniques to minimize radiation dose exposure, including the use of iterative reconstruction and automated exposure control.  3D reconstructions were performed an independent workstation, and are supplied for review.   Rad dose 839 mGy-cm . No note is present in the patient's electronic medical record to ascertain additional symptoms such as intact pulses or skin discoloration. IV Contrast:  100 mL of iohexol (OMNIPAQUE) FINDINGS: VASCULAR STRUCTURES: Mild right common iliac artery atherosclerotic disease without a significant stenosis. The internal and external iliac arteries are patent. The common femoral artery, profunda femoris artery, superficial femoral artery, and popliteal  artery are widely patent supplying three-vessel continuous runoff to the foot with  filling of the dorsalis pedis and plantar arch. The left iliofemoral segment and internal iliac artery are widely patent. The common femoral artery has minimal atherosclerotic disease. The profunda femoris artery, superficial femoral artery, and popliteal artery are widely patent supplying three-vessel runoff to the foot with filling of the dorsalis pedis and plantar arch. EXTREMITY(IES) SOFT TISSUE STRUCTURES: No significant abnormality. OSSEOUS STRUCTURES: No acute fracture or destructive osseous lesion. OTHER PERTINENT FINDINGS: None. CHEST/ABDOMEN/PELVIS/HEAD/NECK VISUALIZED CHEST/ABDOMEN/PELVIS/HEAD/NECK STRUCTURES: No significant abnormality.     Impression: CT angiogram of the bilateral lower extremities with minimal atherosclerotic disease. Workstation performed: [id number]     XR knee 4+ vw right injury    Result Date: 8/1/2024  Narrative: XR KNEE 4+ VW RIGHT INJURY INDICATION: pain. COMPARISON: None FINDINGS: No acute fracture or dislocation. No joint effusion. No significant degenerative changes. No lytic or blastic osseous lesion. Artifact projects over the popliteal fossa region due to some blankets behind the knee. Within the limits of the exam, no obvious soft tissue abnormalities are seen.     Impression: No acute osseous abnormality. Computerized Assisted Algorithm (CAA) may have been used to analyze all applicable images. Workstation performed: [id number]     XR femur 2 views RIGHT    Result Date: 8/1/2024  Narrative: XR FEMUR 2 VW RIGHT INDICATION: pain. COMPARISON: None FINDINGS: No acute fracture or dislocation. No significant degenerative changes. No lytic or blastic osseous lesion. No obvious soft tissue abnormalities. There is some artifact projecting over the popliteal fossa related to some blankets behind the knee.     Impression: No acute osseous abnormality. Computerized Assisted Algorithm (CAA) may have been used to analyze all applicable images. Workstation performed: [id number]     XR  tibia fibula 2 views RIGHT    Result Date: 8/1/2024  Narrative: XR TIBIA FIBULA 2 VW RIGHT INDICATION: pain. COMPARISON: None FINDINGS: No acute fracture or dislocation. No significant degenerative changes. No lytic or blastic osseous lesion. Unremarkable soft tissues.     Impression: No acute osseous abnormality. Computerized Assisted Algorithm (CAA) may have been used to analyze all applicable images. Workstation performed: [id number]

## 2024-08-22 NOTE — ASSESSMENT & PLAN NOTE
Found in the setting of non-ST elevation myocardial infarction in June 2024.  Echocardiogram at the time revealed an ejection fraction of 50% with wall motion abnormalities in the distribution of the RCA.  Cardiac catheterization revealed:    Long Prox -  Mid RCA lesion - 80 % culprit lesion--> AKASH    Mid LAD lesion is 70% stenosed.    1st Mrg lesion is 90% stenosed. Very small vessel.    RPDA lesion is 80% stenosed. Very small vessel.    Currently asymptomatic and doing well on medical regimen including aspirin, Plavix, statin, and beta-blocker.  Status post nuclear stress testing in July 2024 revealing no ischemic changes.  Will continue to keep a close eye on CAD and development of symptoms.

## 2024-09-23 DIAGNOSIS — I24.9 ACS (ACUTE CORONARY SYNDROME) (HCC): ICD-10-CM

## 2024-09-24 RX ORDER — CLOPIDOGREL BISULFATE 75 MG/1
75 TABLET ORAL DAILY
Qty: 90 TABLET | Refills: 1 | Status: SHIPPED | OUTPATIENT
Start: 2024-09-24 | End: 2025-03-23

## 2025-02-28 ENCOUNTER — TRANSCRIBE ORDERS (OUTPATIENT)
Dept: ADMINISTRATIVE | Facility: HOSPITAL | Age: 65
End: 2025-02-28

## 2025-02-28 DIAGNOSIS — E03.9 MYXEDEMA HEART DISEASE: ICD-10-CM

## 2025-02-28 DIAGNOSIS — I51.9 MYXEDEMA HEART DISEASE: ICD-10-CM

## 2025-02-28 DIAGNOSIS — Z12.31 ENCOUNTER FOR SCREENING MAMMOGRAM FOR MALIGNANT NEOPLASM OF BREAST: Primary | ICD-10-CM

## 2025-02-28 DIAGNOSIS — Z00.00 ROUTINE GENERAL MEDICAL EXAMINATION AT A HEALTH CARE FACILITY: ICD-10-CM

## 2025-02-28 DIAGNOSIS — E78.5 HYPERLIPIDEMIA, UNSPECIFIED HYPERLIPIDEMIA TYPE: ICD-10-CM

## 2025-03-06 ENCOUNTER — APPOINTMENT (OUTPATIENT)
Dept: LAB | Facility: CLINIC | Age: 65
End: 2025-03-06
Payer: COMMERCIAL

## 2025-03-06 DIAGNOSIS — E03.9 MYXEDEMA HEART DISEASE: ICD-10-CM

## 2025-03-06 DIAGNOSIS — Z00.00 ROUTINE GENERAL MEDICAL EXAMINATION AT A HEALTH CARE FACILITY: ICD-10-CM

## 2025-03-06 DIAGNOSIS — E78.5 HYPERLIPIDEMIA, UNSPECIFIED HYPERLIPIDEMIA TYPE: ICD-10-CM

## 2025-03-06 DIAGNOSIS — I51.9 MYXEDEMA HEART DISEASE: ICD-10-CM

## 2025-03-06 LAB
ALBUMIN SERPL BCG-MCNC: 4.3 G/DL (ref 3.5–5)
ALP SERPL-CCNC: 65 U/L (ref 34–104)
ALT SERPL W P-5'-P-CCNC: 29 U/L (ref 7–52)
ANION GAP SERPL CALCULATED.3IONS-SCNC: 7 MMOL/L (ref 4–13)
AST SERPL W P-5'-P-CCNC: 19 U/L (ref 13–39)
BASOPHILS # BLD AUTO: 0.08 THOUSANDS/ÂΜL (ref 0–0.1)
BASOPHILS NFR BLD AUTO: 1 % (ref 0–1)
BILIRUB SERPL-MCNC: 0.66 MG/DL (ref 0.2–1)
BUN SERPL-MCNC: 16 MG/DL (ref 5–25)
CALCIUM SERPL-MCNC: 9.6 MG/DL (ref 8.4–10.2)
CHLORIDE SERPL-SCNC: 101 MMOL/L (ref 96–108)
CHOLEST SERPL-MCNC: 129 MG/DL (ref ?–200)
CO2 SERPL-SCNC: 32 MMOL/L (ref 21–32)
CREAT SERPL-MCNC: 0.72 MG/DL (ref 0.6–1.3)
EOSINOPHIL # BLD AUTO: 0.29 THOUSAND/ÂΜL (ref 0–0.61)
EOSINOPHIL NFR BLD AUTO: 4 % (ref 0–6)
ERYTHROCYTE [DISTWIDTH] IN BLOOD BY AUTOMATED COUNT: 11.8 % (ref 11.6–15.1)
GFR SERPL CREATININE-BSD FRML MDRD: 88 ML/MIN/1.73SQ M
GLUCOSE P FAST SERPL-MCNC: 105 MG/DL (ref 65–99)
HCT VFR BLD AUTO: 41.5 % (ref 34.8–46.1)
HDLC SERPL-MCNC: 35 MG/DL
HGB BLD-MCNC: 13.5 G/DL (ref 11.5–15.4)
IMM GRANULOCYTES # BLD AUTO: 0.01 THOUSAND/UL (ref 0–0.2)
IMM GRANULOCYTES NFR BLD AUTO: 0 % (ref 0–2)
LDLC SERPL CALC-MCNC: 66 MG/DL (ref 0–100)
LYMPHOCYTES # BLD AUTO: 2.48 THOUSANDS/ÂΜL (ref 0.6–4.47)
LYMPHOCYTES NFR BLD AUTO: 34 % (ref 14–44)
MCH RBC QN AUTO: 30.9 PG (ref 26.8–34.3)
MCHC RBC AUTO-ENTMCNC: 32.5 G/DL (ref 31.4–37.4)
MCV RBC AUTO: 95 FL (ref 82–98)
MONOCYTES # BLD AUTO: 0.78 THOUSAND/ÂΜL (ref 0.17–1.22)
MONOCYTES NFR BLD AUTO: 11 % (ref 4–12)
NEUTROPHILS # BLD AUTO: 3.56 THOUSANDS/ÂΜL (ref 1.85–7.62)
NEUTS SEG NFR BLD AUTO: 50 % (ref 43–75)
NONHDLC SERPL-MCNC: 94 MG/DL
NRBC BLD AUTO-RTO: 0 /100 WBCS
PLATELET # BLD AUTO: 282 THOUSANDS/UL (ref 149–390)
PMV BLD AUTO: 10.1 FL (ref 8.9–12.7)
POTASSIUM SERPL-SCNC: 4.9 MMOL/L (ref 3.5–5.3)
PROT SERPL-MCNC: 7.8 G/DL (ref 6.4–8.4)
RBC # BLD AUTO: 4.37 MILLION/UL (ref 3.81–5.12)
SODIUM SERPL-SCNC: 140 MMOL/L (ref 135–147)
TRIGL SERPL-MCNC: 141 MG/DL (ref ?–150)
TSH SERPL DL<=0.05 MIU/L-ACNC: 3.35 UIU/ML (ref 0.45–4.5)
WBC # BLD AUTO: 7.2 THOUSAND/UL (ref 4.31–10.16)

## 2025-03-06 PROCEDURE — 80061 LIPID PANEL: CPT

## 2025-03-06 PROCEDURE — 85025 COMPLETE CBC W/AUTO DIFF WBC: CPT

## 2025-03-06 PROCEDURE — 84443 ASSAY THYROID STIM HORMONE: CPT

## 2025-03-06 PROCEDURE — 36415 COLL VENOUS BLD VENIPUNCTURE: CPT

## 2025-03-06 PROCEDURE — 80053 COMPREHEN METABOLIC PANEL: CPT

## 2025-03-12 DIAGNOSIS — I24.9 ACS (ACUTE CORONARY SYNDROME) (HCC): ICD-10-CM

## 2025-03-13 RX ORDER — CLOPIDOGREL BISULFATE 75 MG/1
75 TABLET ORAL DAILY
Qty: 90 TABLET | Refills: 0 | Status: SHIPPED | OUTPATIENT
Start: 2025-03-13

## 2025-05-07 NOTE — PROGRESS NOTES
Cardiology    Follow Up   Office Visit Note  Siena Olmedo   65 y.o.   female   MRN: [id number]  [facility]  [address]  [address]  [place] [id number] [phone]    PCP: Monty Mora MD  Cardiologist:  Dr. Jones            Summary of Plan:  Heart healthy diet: Mediterranean or DASH  Check LP(a) given family history of premature disease.  Handout provided  I provided a slip for reassessment of her lipids in the future  Echo re: eval LV fxn/ wall motion  BNP, BMP re: persistent cough  Follow-up with Dr. Jones  3-4 months          Assessment/Plan  Cough x 3-4 months   Wt stable   Will check BMP/ BNP   Adheres to a salt restricted diet  CAD, S/P non-STEMI  6/2025  Underwent PCI/AKASH to the culprit 80% lesion in the mid RCA.  On aspirin 81, Plavix 75 daily, high intensity statin, beta-blocker  Residual: 70% mid LAD, 90% OM1, 80% RPDA--out pt stress test recommended by interventional cardiology  Status post nuclear stress testing in July 2024 revealing no ischemic changes.   She denies angina  EF 50% with Wall motion abnormalities in the RCA distribution-echo 6/3/2024  Will get an updated echocardiogram for reevaluation of LV function  Hypertension.   /70   lisinopril 10 mg daily, plus lisinopril HCTZ 20/12.5 daily, metoprolol succinate 75 mg daily  Hyperlipidemia, mixed.    On atorvastatin 80 mg daily   3/6/2025: LDL 66, non-HDL 94.  7/19/2024 LDL was 41.  Continue the plan; reassess next year.  Goal LDL less than 55  Heart healthy diet education provided  Hypothyroidisn  Family history premature CAD:  mother with fatal MI, oldest sister with CABG in the her 50s. Youngest sister with heart transplant in her 50s.   Pre diabetes.  hgA1c 5.8  Cardiac testing  [facility] 6/3/24. Cardiac cath performed via the RFA. Closed with angioseal. The R radial artery was accessed but the vessel spasmed just prior to starting the PCI.   Long Prox -  Mid RCA lesion - 80 %  culprit lesion--> AKASH    Mid LAD lesion is 70% stenosed.    1st Mrg lesion is 90% stenosed. Very small vessel.    RPDA lesion is 80% stenosed. Very small vessel.   Plan; Cardiac rehab. Outpatient nuclear stress test - The LAD would be amendable to PCI. OM1 and the PDA are very small vessels. Both would be higher risk PCI's.   TTE 6/3/24. EF 50%.   The following segments are akinetic: mid inferior and apical inferior. The following segments are hypokinetic: basal inferoseptal, basal inferior, basal inferolateral and mid inferoseptal.All other segments are normal. Mild TR  SPECT 7/1/24. There is a small focal moderate intensity fixed defect at the mid to apical inferior wall.  No ischemia.  This defect may represent either diaphragmatic attenuation or a small area of focal scar.                    DREW Olmedo is a 65 y.o.year old female with hypertension, marked dyslipidemia, chronic vertigo on meclizine, hypothyroidism and GERD.  She has family history of premature CAD.  Her mother had a fatal MI.  Her older sister had CABG in her 50s.  Her younger sister underwent heart transplant in her 50s.      She last saw Dr. Chaney in November 2020.  He noted in the past she had episodes of syncope that were rare and infrequent.  Her total cholesterol was 256, calculated  non-, triglycerides 360.  He recommended a Zio patch and if unrevealing, a loop recorder implant, however she had no health insurance and was not interested in pursuing further testing.  She was lost to cardiology follow-up    Denies tobacco, etoh, illicit drug use.       ADM 6/1 - 6/4/2024  CC: Chest burning/tightness while cleaning her house, with associated nausea, diaphoresis  EKG: ST T wave changes suggestive of ischemia, laterally and inferiorly.    Elevated troponin:444> 726> 1684> 2074   She underwent left heart catheterization which showed multivessel disease.  She underwent PCI with AKASH to culprit to 80% mid RCA.   Otherwise she had an 80% stenosis of the RPDA, very small vessel, 90% stenosis of OM1, very small's muscle and 70% stenosis of the mid LAD.  An outpatient nuclear stress is recommended.  She was placed on DAPT with aspirin and Plavix.  She was started on high intensity statin with atorvastatin 80 mg daily along with beta-blocker.  Her echocardiogram showed an EF of 50% with wall motion abnormalities in the RCA territory.      6/12/24  Hospital follow-up.  Review of systems: She has a significant hematoma of the right forearm.  It is fairly edematous and tender.  She has a good distal pulse.  She denies any chest pain or shortness of breath.  She is adherent to her medical therapy without problems  Today we reviewed her coronary angiogram and her echocardiogram she is aware of the findings  A pharmacological nuclear stress test was recommended by interventional cardiology to assess for residual disease  I encouraged adherence to DAPT  I recommended nonfasting BMP to assess her renal function and electrolytes  We discussed her baseline lipids.  She is tolerating her current therapy.  Her statin was uptitrated.  I gave her slip for reassessment in 4 to 12 weeks.  I encouraged adoption of a heart healthy diet and education was provided.  She received the MI booklet.  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  This will be difficult with her right forearm issues 1 was provided for her today.      6/21/24 ED visit  Dizziness  NSR 63 bpm. /59  Normal K, Cr  CBC normal     HS trop 7,6  CXR - NAD  Given 500 cc IV flds  DCd to cardiology F/U    7/11/24  Follow-up ED visit.  She comes in with her .  She has had no further episodes of dizziness.  However, she has known vertigo and has a prescription for meclizine.  She was encouraged to use this.  Her blood pressure is elevated today at 148/66.  Heart rate is 67.  I will increase Toprol to 75 mg  daily.  She requested an extra 25 mg tablet as she did not want to cut the 50 mg tablets to get this dose.  I encourage participation in cardiac rehab.  It iss not clear  she will participate given co-pays.  I also reviewed with her her most recent stress test that showed no ischemia  I provided a slip for reassessment of her lipids given her atorvastatin was increased.  If she has recurrent dizziness, despite taking the meclizine, I recommend she follow with her PCP.  I also encouraged hydration which she tells me she has been doing     Interval history  8/22/2024 OV Dr. Jones  No change      5/8/2024   Cardiology follow-up  PMH:  hypertension, marked dyslipidemia, chronic vertigo on meclizine, hypothyroidism and GERD.  She has family history of premature CAD.  Her mother had a fatal MI.  Her older sister had CABG in her 50s.  Her younger sister underwent heart transplant in her 50s.      She presents for general cardiology follow-up, coming by her   She adheres to a salt restricted diet.  She exercises, by walking with the children at her  going around the complex, frequently.  She does not go for specific walks, at a faster pace however.  With her usual activity she denies chest pain or shortness of breath.  For the last 3 months she has had a persistent cough, during the day and at at bedtime.  Her weight is stable.  Sometimes she gets dizzy.  She has known vertigo and meclizine helps    Last labs 3/6/2025: Creatinine 0.7 BUN 16 potassium 4.9  LDL 66  128/60  Weight 136 pounds.  I will send her for an updated echocardiogram to reevaluate her LV function.  Will also get a BMP, BNP  Will also obtain an LP(a) to assess for hereditary component of high cholesterol.  She understands this a marker risk not a target of therapy at this current time.  If this is elevated we may desire a lower LDL.  In the past her LDL was 41.  For now, we will continue the current plan  She will return to see her  cardiologist in short interval        Review of Systems   Constitutional: Negative for chills.   Cardiovascular:  Negative for chest pain, claudication, cyanosis, dyspnea on exertion, irregular heartbeat, leg swelling, near-syncope, orthopnea, palpitations, paroxysmal nocturnal dyspnea and syncope.   Respiratory:  Positive for cough. Negative for shortness of breath.    Gastrointestinal:  Negative for heartburn and nausea.   Neurological:  Negative for dizziness, focal weakness, headaches, light-headedness and weakness.   All other systems reviewed and are negative.          Assessment  Diagnoses and all orders for this visit:    Coronary artery disease involving native coronary artery of native heart without angina pectoris  -     POCT ECG  -     Lipid panel; Future  -     Echo complete w/ contrast if indicated; Future  -     Basic metabolic panel; Future  -     B-Type Natriuretic Peptide(BNP); Future    Mixed hyperlipidemia  -     Lipid panel; Future  -     Lipoprotein A (LPA); Future  -     Echo complete w/ contrast if indicated; Future    Benign hypertension  -     Echo complete w/ contrast if indicated; Future    Status post insertion of drug eluting coronary artery stent  -     Echo complete w/ contrast if indicated; Future  -     Basic metabolic panel; Future  -     B-Type Natriuretic Peptide(BNP); Future    Cough, unspecified type  -     Basic metabolic panel; Future  -     B-Type Natriuretic Peptide(BNP); Future    Ischemic cardiomyopathy  -     Basic metabolic panel; Future  -     B-Type Natriuretic Peptide(BNP); Future            Past Medical History:   Diagnosis Date    Disease of thyroid gland     High cholesterol     Hypertension     Vertigo        Past Surgical History:   Procedure Laterality Date    CARDIAC CATHETERIZATION N/A 6/3/2024    Procedure: Cardiac pci;  Surgeon: Mele Carlson MD;  Location: [facility];  Service: Cardiology    CARDIAC CATHETERIZATION N/A 6/3/2024    Procedure:  Cardiac Coronary Angiogram;  Surgeon: Mele Carlson MD;  [facility]: [facility];  Service: Cardiology           Allergies  Allergies   Allergen Reactions    Sulfa Antibiotics Rash         Medications    Current Outpatient Medications:     Ascorbic Acid (vitamin C) 1000 MG tablet, Take 1,000 mg by mouth daily, Disp: , Rfl:     aspirin 81 mg chewable tablet, Chew 1 tablet (81 mg total) daily, Disp: 30 tablet, Rfl: 11    atorvastatin (LIPITOR) 80 mg tablet, Take 1 tablet (80 mg total) by mouth daily, Disp: 30 tablet, Rfl: 11    cholecalciferol (VITAMIN D3) 1,000 units tablet, Take 1,000 Units by mouth daily, Disp: , Rfl:     clopidogrel (PLAVIX) 75 mg tablet, take 1 tablet by mouth once daily, Disp: 90 tablet, Rfl: 0    famotidine (PEPCID) 20 mg tablet, Take 1 tablet (20 mg total) by mouth daily for 5 days, Disp: 5 tablet, Rfl: 0    levothyroxine 50 mcg tablet, Take 50 mcg by mouth daily, Disp: , Rfl:     lisinopril (ZESTRIL) 10 mg tablet, Take 10 mg by mouth daily, Disp: , Rfl:     meclizine (ANTIVERT) 25 mg tablet, Take 25 mg by mouth 3 (three) times a day as needed, Disp: , Rfl:     metoprolol succinate (TOPROL-XL) 25 mg 24 hr tablet, Take 1 tablet (25 mg total) by mouth daily, Disp: 90 tablet, Rfl: 3    metoprolol succinate (TOPROL-XL) 50 mg 24 hr tablet, Take 1 tablet (50 mg total) by mouth daily, Disp: 30 tablet, Rfl: 11    Multiple Vitamins-Minerals (multivitamin with minerals) tablet, Take 1 tablet by mouth daily, Disp: , Rfl:     vitamin E 100 UNIT capsule, Take 100 Units by mouth daily, Disp: , Rfl:     lidocaine (Lidoderm) 5 %, Apply 1 patch topically over 12 hours daily for 5 days Remove & Discard patch within 12 hours or as directed by MD (Patient not taking: Reported on 8/22/2024), Disp: 5 patch, Rfl: 0    lidocaine (Lidoderm) 5 %, Apply 1 patch topically over 12 hours daily for 7 days Remove & Discard patch within 12 hours or as directed by MD (Patient not taking: Reported on 5/8/2025), Disp: 7  patch, Rfl: 0    lisinopril-hydrochlorothiazide (PRINZIDE,ZESTORETIC) 20-12.5 MG per tablet, Take 1 tablet by mouth daily (Patient not taking: Reported on 5/8/2025), Disp: , Rfl:     methocarbamol (ROBAXIN) 500 mg tablet, Take 1 tablet (500 mg total) by mouth 2 (two) times a day (Patient not taking: Reported on 8/22/2024), Disp: 20 tablet, Rfl: 0    sucralfate (CARAFATE) 1 g tablet, Take 1 tablet (1 g total) by mouth 4 (four) times a day for 7 days (Patient not taking: Reported on 7/11/2024), Disp: 28 tablet, Rfl: 0      Social History     Socioeconomic History    Marital status: /Civil Union     Spouse name: Not on file    Number of children: Not on file    Years of education: Not on file    Highest education level: Not on file   Occupational History    Not on file   Tobacco Use    Smoking status: Never    Smokeless tobacco: Never   Vaping Use    Vaping status: Not on file   Substance and Sexual Activity    Alcohol use: Not Currently    Drug use: Not Currently    Sexual activity: Not on file   Other Topics Concern    Not on file   Social History Narrative    Not on file     Social Drivers of Health     Financial Resource Strain: Not on file   Food Insecurity: Not on file   Transportation Needs: Not on file   Physical Activity: Not on file   Stress: Not on file   Social Connections: Not on file   Intimate Partner Violence: Not on file   Housing Stability: Not on file       History reviewed. No pertinent family history.    Physical Exam  Vitals and nursing note reviewed.   Constitutional:       General: She is not in acute distress.     Appearance: She is not diaphoretic.   HENT:      Head: Normocephalic and atraumatic.   Eyes:      Conjunctiva/sclera: Conjunctivae normal.   Cardiovascular:      Rate and Rhythm: Normal rate and regular rhythm.      Pulses: Intact distal pulses.      Heart sounds: Normal heart sounds.   Pulmonary:      Effort: Pulmonary effort is normal.      Breath sounds: Decreased breath  "sounds present.   Abdominal:      General: Bowel sounds are normal.      Palpations: Abdomen is soft.   Musculoskeletal:         General: Normal range of motion.      Cervical back: Normal range of motion and neck supple.      Comments: Significant edema and ecchymosis noted about the volar aspect of the right forearm.  Radial pulse is +2   Skin:     General: Skin is warm and dry.   Neurological:      Mental Status: She is alert and oriented to person, place, and time.         Vitals: Blood pressure 128/70, pulse 62, weight 62 kg (136 lb 9.6 oz), SpO2 98%.   Wt Readings from Last 3 Encounters:   05/08/25 62 kg (136 lb 9.6 oz)   08/22/24 63 kg (138 lb 12.8 oz)   08/01/24 63 kg (138 lb 14.2 oz)           Labs & Results:  Lab Results   Component Value Date    WBC 7.20 03/06/2025    HGB 13.5 03/06/2025    HCT 41.5 03/06/2025    MCV 95 03/06/2025     03/06/2025     BNP   Date Value Ref Range Status   06/21/2024 116 (H) 0 - 100 pg/mL Final   06/01/2024 72 0 - 100 pg/mL Final     No components found for: \"CHEM\"  Total CK   Date Value Ref Range Status   08/01/2024 84 26 - 192 U/L Final     No results found for this or any previous visit.    No results found for this or any previous visit.    No valid procedures specified.  No results found for this or any previous visit.                    This note was completed in part utilizing [email or web address] direct voice recognition software.   Grammatical errors, random word insertion, spelling mistakes, and incomplete sentences may be an occasional consequence of the system secondary to software limitations, ambient noise and hardware issues. At the time of dictation, efforts were made to edit, clarify and /or correct errors.  Please read the chart carefully and recognize, using context, where substitutions have occurred.  If you have any questions or concerns about the context, text or information contained within the body of this dictation, please contact myself, the " provider, for further clarification

## 2025-05-08 ENCOUNTER — OFFICE VISIT (OUTPATIENT)
Dept: CARDIOLOGY CLINIC | Facility: CLINIC | Age: 65
End: 2025-05-08
Payer: COMMERCIAL

## 2025-05-08 VITALS
BODY MASS INDEX: 24.98 KG/M2 | WEIGHT: 136.6 LBS | DIASTOLIC BLOOD PRESSURE: 70 MMHG | OXYGEN SATURATION: 98 % | HEART RATE: 62 BPM | SYSTOLIC BLOOD PRESSURE: 128 MMHG

## 2025-05-08 DIAGNOSIS — I10 BENIGN HYPERTENSION: ICD-10-CM

## 2025-05-08 DIAGNOSIS — E78.2 MIXED HYPERLIPIDEMIA: ICD-10-CM

## 2025-05-08 DIAGNOSIS — R05.9 COUGH, UNSPECIFIED TYPE: ICD-10-CM

## 2025-05-08 DIAGNOSIS — I25.5 ISCHEMIC CARDIOMYOPATHY: ICD-10-CM

## 2025-05-08 DIAGNOSIS — Z95.5 STATUS POST INSERTION OF DRUG ELUTING CORONARY ARTERY STENT: ICD-10-CM

## 2025-05-08 DIAGNOSIS — I25.10 CORONARY ARTERY DISEASE INVOLVING NATIVE CORONARY ARTERY OF NATIVE HEART WITHOUT ANGINA PECTORIS: Primary | ICD-10-CM

## 2025-05-08 PROCEDURE — 99215 OFFICE O/P EST HI 40 MIN: CPT | Performed by: NURSE PRACTITIONER

## 2025-05-08 PROCEDURE — 93000 ELECTROCARDIOGRAM COMPLETE: CPT | Performed by: NURSE PRACTITIONER

## 2025-05-08 NOTE — LETTER
May 8, 2025     Monty Mora MD  [address] [place] [address]  [facility] [address]    Patient: Siena Olmedo   YOB: 1960   Date of Visit: 5/8/2025       Dear Dr. Monty Mora MD:    Thank you for referring Siena Olmedo to me for evaluation. Below are my notes for this consultation.    If you have questions, please do not hesitate to call me. I look forward to following your patient along with you.         Sincerely,        CAM Preston        CC: No Recipients    CAM Presotn  5/8/2025  8:29 AM  Sign when Signing Visit  Cardiology    Follow Up   Office Visit Note  Siena Olmedo   65 y.o.   female   MRN: [id number]  [facility]  [address] [place] [address]  [address]  [facility] [id number] [phone]    PCP: Monty Mora MD  Cardiologist:  Dr. Jones            Summary of Plan:  Heart healthy diet: Mediterranean or DASH  Check LP(a) given family history of premature disease.  Handout provided  Behavior slip for reassessment of her lipids in the future  Echo re: eval LV fxn/ wall motion  BNP, BMP re: persistent cough  Follow-up with Dr. Jones  3-4 months          Assessment/Plan  Cough x 3-4 months   Wt stable   Will check BMP/ BNP   Adheres to a salt restricted diet  CAD, S/P non-STEMI  6/2025  Underwent PCI/AKASH to the culprit 80% lesion in the mid RCA.  On aspirin 81, Plavix 75 daily, high intensity statin, beta-blocker  Residual: 70% mid LAD, 90% OM1, 80% RPDA--out pt stress test recommended by interventional cardiology  Status post nuclear stress testing in July 2024 revealing no ischemic changes.   She denies angina  EF 50% with Wall motion abnormalities in the RCA distribution-echo 6/3/2024  Will get an updated echocardiogram for reevaluation of LV function  Hypertension.   /70   lisinopril 10 mg daily, plus lisinopril HCTZ 20/12.5 daily, metoprolol succinate 75 mg daily  Hyperlipidemia, mixed.    On atorvastatin 80 mg daily    3/6/2025: LDL 66, non-HDL 94.  7/19/2024 LDL was 41.  Continue the plan; reassess next year.  Goal LDL less than 55  Heart healthy diet education provided  Hypothyroidisn  Family history premature CAD:  mother with fatal MI, oldest sister with CABG in the her 50s. Youngest sister with heart transplant in her 50s.   Pre diabetes.  hgA1c 5.8  Cardiac testing  [facility] 6/3/24. Cardiac cath performed via the RFA. Closed with angioseal. The R radial artery was accessed but the vessel spasmed just prior to starting the PCI.   Long Prox -  Mid RCA lesion - 80 % culprit lesion--> AKASH  •  Mid LAD lesion is 70% stenosed.  •  1st Mrg lesion is 90% stenosed. Very small vessel.  •  RPDA lesion is 80% stenosed. Very small vessel.   Plan; Cardiac rehab. Outpatient nuclear stress test - The LAD would be amendable to PCI. OM1 and the PDA are very small vessels. Both would be higher risk PCI's.   TTE 6/3/24. EF 50%.   The following segments are akinetic: mid inferior and apical inferior. The following segments are hypokinetic: basal inferoseptal, basal inferior, basal inferolateral and mid inferoseptal.All other segments are normal. Mild TR  SPECT 7/1/24. There is a small focal moderate intensity fixed defect at the mid to apical inferior wall.  No ischemia.  This defect may represent either diaphragmatic attenuation or a small area of focal scar.                    DREW Olmedo is a 65 y.o.year old female with hypertension, marked dyslipidemia, chronic vertigo on meclizine, hypothyroidism and GERD.  She has family history of premature CAD.  Her mother had a fatal MI.  Her older sister had CABG in her 50s.  Her younger sister underwent heart transplant in her 50s.      She last saw Dr. Chaney in November 2020.  He noted in the past she had episodes of syncope that were rare and infrequent.  Her total cholesterol was 256, calculated  non-, triglycerides 360.  He recommended a Zio patch and if unrevealing, a  loop recorder implant, however she had no health insurance and was not interested in pursuing further testing.  She was lost to cardiology follow-up    Denies tobacco, etoh, illicit drug use.       ADM 6/1 - 6/4/2024  CC: Chest burning/tightness while cleaning her house, with associated nausea, diaphoresis  EKG: ST T wave changes suggestive of ischemia, laterally and inferiorly.    Elevated troponin:444> 726> 1684> 2074   She underwent left heart catheterization which showed multivessel disease.  She underwent PCI with AKASH to culprit to 80% mid RCA.  Otherwise she had an 80% stenosis of the RPDA, very small vessel, 90% stenosis of OM1, very small's muscle and 70% stenosis of the mid LAD.  An outpatient nuclear stress is recommended.  She was placed on DAPT with aspirin and Plavix.  She was started on high intensity statin with atorvastatin 80 mg daily along with beta-blocker.  Her echocardiogram showed an EF of 50% with wall motion abnormalities in the RCA territory.      6/12/24  Hospital follow-up.  Review of systems: She has a significant hematoma of the right forearm.  It is fairly edematous and tender.  She has a good distal pulse.  She denies any chest pain or shortness of breath.  She is adherent to her medical therapy without problems  Today we reviewed her coronary angiogram and her echocardiogram she is aware of the findings  A pharmacological nuclear stress test was recommended by interventional cardiology to assess for residual disease  I encouraged adherence to DAPT  I recommended nonfasting BMP to assess her renal function and electrolytes  We discussed her baseline lipids.  She is tolerating her current therapy.  Her statin was uptitrated.  I gave her slip for reassessment in 4 to 12 weeks.  I encouraged adoption of a heart healthy diet and education was provided.  She received the MI booklet.  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  This  will be difficult with her right forearm issues 1 was provided for her today.      6/21/24 ED visit  Dizziness  NSR 63 bpm. /59  Normal K, Cr  CBC normal     HS trop 7,6  CXR - NAD  Given 500 cc IV flds  DCd to cardiology F/U    7/11/24  Follow-up ED visit.  She comes in with her .  She has had no further episodes of dizziness.  However, she has known vertigo and has a prescription for meclizine.  She was encouraged to use this.  Her blood pressure is elevated today at 148/66.  Heart rate is 67.  I will increase Toprol to 75 mg daily.  She requested an extra 25 mg tablet as she did not want to cut the 50 mg tablets to get this dose.  I encourage participation in cardiac rehab.  It iss not clear  she will participate given co-pays.  I also reviewed with her her most recent stress test that showed no ischemia  I provided a slip for reassessment of her lipids given her atorvastatin was increased.  If she has recurrent dizziness, despite taking the meclizine, I recommend she follow with her PCP.  I also encouraged hydration which she tells me she has been doing     Interval history  8/22/2024 OV Dr. Jones  No change      5/8/2024   Cardiology follow-up  PMH:  hypertension, marked dyslipidemia, chronic vertigo on meclizine, hypothyroidism and GERD.  She has family history of premature CAD.  Her mother had a fatal MI.  Her older sister had CABG in her 50s.  Her younger sister underwent heart transplant in her 50s.      She presents for general cardiology follow-up, coming by her   She adheres to a salt restricted diet.  She exercises, by walking with the children at her  going around the complex, frequently.  She does not go for specific walks, at a faster pace however.  With her usual activity she denies chest pain or shortness of breath.  For the last 3 months she has had a persistent cough, during the day and at at bedtime.  Her weight is stable.  Sometimes she gets dizzy.  She has  known vertigo and meclizine helps    Last labs 3/6/2025: Creatinine 0.7 BUN 16 potassium 4.9  LDL 66  128/60  Weight 136 pounds.  I will send her for an updated echocardiogram to reevaluate her LV function.  Will also get a BMP, BNP  Will also obtain an LP(a) to assess for hereditary component of high cholesterol.  She understands this a marker risk not a target of therapy at this current time.  If this is elevated we may desire a lower LDL.  In the past her LDL was 41.  For now, we will continue the current plan  She will return to see her cardiologist in short interval        Review of Systems   Constitutional: Negative for chills.   Cardiovascular:  Negative for chest pain, claudication, cyanosis, dyspnea on exertion, irregular heartbeat, leg swelling, near-syncope, orthopnea, palpitations, paroxysmal nocturnal dyspnea and syncope.   Respiratory:  Positive for cough. Negative for shortness of breath.    Gastrointestinal:  Negative for heartburn and nausea.   Neurological:  Negative for dizziness, focal weakness, headaches, light-headedness and weakness.   All other systems reviewed and are negative.          Assessment  Diagnoses and all orders for this visit:    Coronary artery disease involving native coronary artery of native heart without angina pectoris  -     POCT ECG  -     Lipid panel; Future  -     Echo complete w/ contrast if indicated; Future  -     Basic metabolic panel; Future  -     B-Type Natriuretic Peptide(BNP); Future    Mixed hyperlipidemia  -     Lipid panel; Future  -     Lipoprotein A (LPA); Future  -     Echo complete w/ contrast if indicated; Future    Benign hypertension  -     Echo complete w/ contrast if indicated; Future    Status post insertion of drug eluting coronary artery stent  -     Echo complete w/ contrast if indicated; Future  -     Basic metabolic panel; Future  -     B-Type Natriuretic Peptide(BNP); Future    Cough, unspecified type  -     Basic metabolic panel; Future  -      B-Type Natriuretic Peptide(BNP); Future    Ischemic cardiomyopathy  -     Basic metabolic panel; Future  -     B-Type Natriuretic Peptide(BNP); Future            Past Medical History:   Diagnosis Date   • Disease of thyroid gland    • High cholesterol    • Hypertension    • Vertigo        Past Surgical History:   Procedure Laterality Date   • CARDIAC CATHETERIZATION N/A 6/3/2024    Procedure: Cardiac pci;  Surgeon: Mele Carlson MD;  [facility];  Service: Cardiology   • CARDIAC CATHETERIZATION N/A 6/3/2024    Procedure: Cardiac Coronary Angiogram;  Surgeon: Mele Carlson MD;  [facility];  Service: Cardiology           Allergies  Allergies   Allergen Reactions   • Sulfa Antibiotics Rash         Medications    Current Outpatient Medications:   •  Ascorbic Acid (vitamin C) 1000 MG tablet, Take 1,000 mg by mouth daily, Disp: , Rfl:   •  aspirin 81 mg chewable tablet, Chew 1 tablet (81 mg total) daily, Disp: 30 tablet, Rfl: 11  •  atorvastatin (LIPITOR) 80 mg tablet, Take 1 tablet (80 mg total) by mouth daily, Disp: 30 tablet, Rfl: 11  •  cholecalciferol (VITAMIN D3) 1,000 units tablet, Take 1,000 Units by mouth daily, Disp: , Rfl:   •  clopidogrel (PLAVIX) 75 mg tablet, take 1 tablet by mouth once daily, Disp: 90 tablet, Rfl: 0  •  famotidine (PEPCID) 20 mg tablet, Take 1 tablet (20 mg total) by mouth daily for 5 days, Disp: 5 tablet, Rfl: 0  •  levothyroxine 50 mcg tablet, Take 50 mcg by mouth daily, Disp: , Rfl:   •  lisinopril (ZESTRIL) 10 mg tablet, Take 10 mg by mouth daily, Disp: , Rfl:   •  meclizine (ANTIVERT) 25 mg tablet, Take 25 mg by mouth 3 (three) times a day as needed, Disp: , Rfl:   •  metoprolol succinate (TOPROL-XL) 25 mg 24 hr tablet, Take 1 tablet (25 mg total) by mouth daily, Disp: 90 tablet, Rfl: 3  •  metoprolol succinate (TOPROL-XL) 50 mg 24 hr tablet, Take 1 tablet (50 mg total) by mouth daily, Disp: 30 tablet, Rfl: 11  •  Multiple Vitamins-Minerals  (multivitamin with minerals) tablet, Take 1 tablet by mouth daily, Disp: , Rfl:   •  vitamin E 100 UNIT capsule, Take 100 Units by mouth daily, Disp: , Rfl:   •  lidocaine (Lidoderm) 5 %, Apply 1 patch topically over 12 hours daily for 5 days Remove & Discard patch within 12 hours or as directed by MD (Patient not taking: Reported on 8/22/2024), Disp: 5 patch, Rfl: 0  •  lidocaine (Lidoderm) 5 %, Apply 1 patch topically over 12 hours daily for 7 days Remove & Discard patch within 12 hours or as directed by MD (Patient not taking: Reported on 5/8/2025), Disp: 7 patch, Rfl: 0  •  lisinopril-hydrochlorothiazide (PRINZIDE,ZESTORETIC) 20-12.5 MG per tablet, Take 1 tablet by mouth daily (Patient not taking: Reported on 5/8/2025), Disp: , Rfl:   •  methocarbamol (ROBAXIN) 500 mg tablet, Take 1 tablet (500 mg total) by mouth 2 (two) times a day (Patient not taking: Reported on 8/22/2024), Disp: 20 tablet, Rfl: 0  •  sucralfate (CARAFATE) 1 g tablet, Take 1 tablet (1 g total) by mouth 4 (four) times a day for 7 days (Patient not taking: Reported on 7/11/2024), Disp: 28 tablet, Rfl: 0      Social History     Socioeconomic History   • Marital status: /Civil Union     Spouse name: Not on file   • Number of children: Not on file   • Years of education: Not on file   • Highest education level: Not on file   Occupational History   • Not on file   Tobacco Use   • Smoking status: Never   • Smokeless tobacco: Never   Vaping Use   • Vaping status: Not on file   Substance and Sexual Activity   • Alcohol use: Not Currently   • Drug use: Not Currently   • Sexual activity: Not on file   Other Topics Concern   • Not on file   Social History Narrative   • Not on file     Social Drivers of Health     Financial Resource Strain: Not on file   Food Insecurity: Not on file   Transportation Needs: Not on file   Physical Activity: Not on file   Stress: Not on file   Social Connections: Not on file   Intimate Partner Violence: Not on file  "  Housing Stability: Not on file       History reviewed. No pertinent family history.    Physical Exam  Vitals and nursing note reviewed.   Constitutional:       General: She is not in acute distress.     Appearance: She is not diaphoretic.   HENT:      Head: Normocephalic and atraumatic.   Eyes:      Conjunctiva/sclera: Conjunctivae normal.   Cardiovascular:      Rate and Rhythm: Normal rate and regular rhythm.      Pulses: Intact distal pulses.      Heart sounds: Normal heart sounds.   Pulmonary:      Effort: Pulmonary effort is normal.      Breath sounds: Decreased breath sounds present.   Abdominal:      General: Bowel sounds are normal.      Palpations: Abdomen is soft.   Musculoskeletal:         General: Normal range of motion.      Cervical back: Normal range of motion and neck supple.      Comments: Significant edema and ecchymosis noted about the volar aspect of the right forearm.  Radial pulse is +2   Skin:     General: Skin is warm and dry.   Neurological:      Mental Status: She is alert and oriented to person, place, and time.         Vitals: Blood pressure 128/70, pulse 62, weight 62 kg (136 lb 9.6 oz), SpO2 98%.   Wt Readings from Last 3 Encounters:   05/08/25 62 kg (136 lb 9.6 oz)   08/22/24 63 kg (138 lb 12.8 oz)   08/01/24 63 kg (138 lb 14.2 oz)           Labs & Results:  Lab Results   Component Value Date    WBC 7.20 03/06/2025    HGB 13.5 03/06/2025    HCT 41.5 03/06/2025    MCV 95 03/06/2025     03/06/2025     BNP   Date Value Ref Range Status   06/21/2024 116 (H) 0 - 100 pg/mL Final   06/01/2024 72 0 - 100 pg/mL Final     No components found for: \"CHEM\"  Total CK   Date Value Ref Range Status   08/01/2024 84 26 - 192 U/L Final     No results found for this or any previous visit.    No results found for this or any previous visit.    No valid procedures specified.  No results found for this or any previous visit.                    This note was completed in part utilizing m-modal fluency " direct voice recognition software.   Grammatical errors, random word insertion, spelling mistakes, and incomplete sentences may be an occasional consequence of the system secondary to software limitations, ambient noise and hardware issues. At the time of dictation, efforts were made to edit, clarify and /or correct errors.  Please read the chart carefully and recognize, using context, where substitutions have occurred.  If you have any questions or concerns about the context, text or information contained within the body of this dictation, please contact myself, the provider, for further clarification

## 2025-05-19 DIAGNOSIS — I25.10 CORONARY ARTERY DISEASE INVOLVING NATIVE CORONARY ARTERY OF NATIVE HEART WITHOUT ANGINA PECTORIS: ICD-10-CM

## 2025-05-19 DIAGNOSIS — I10 BENIGN HYPERTENSION: ICD-10-CM

## 2025-05-19 RX ORDER — METOPROLOL SUCCINATE 50 MG/1
50 TABLET, EXTENDED RELEASE ORAL DAILY
Qty: 30 TABLET | Refills: 11 | Status: SHIPPED | OUTPATIENT
Start: 2025-05-19

## 2025-05-27 DIAGNOSIS — I24.9 ACS (ACUTE CORONARY SYNDROME) (HCC): ICD-10-CM

## 2025-05-27 RX ORDER — ATORVASTATIN CALCIUM 80 MG/1
80 TABLET, FILM COATED ORAL DAILY
Qty: 30 TABLET | Refills: 5 | Status: SHIPPED | OUTPATIENT
Start: 2025-05-27 | End: 2025-11-23

## 2025-05-30 ENCOUNTER — APPOINTMENT (OUTPATIENT)
Dept: LAB | Facility: CLINIC | Age: 65
End: 2025-05-30
Payer: COMMERCIAL

## 2025-05-30 DIAGNOSIS — Z95.5 STATUS POST INSERTION OF DRUG ELUTING CORONARY ARTERY STENT: ICD-10-CM

## 2025-05-30 DIAGNOSIS — I25.5 ISCHEMIC CARDIOMYOPATHY: ICD-10-CM

## 2025-05-30 DIAGNOSIS — R05.9 COUGH, UNSPECIFIED TYPE: ICD-10-CM

## 2025-05-30 DIAGNOSIS — I25.10 CORONARY ARTERY DISEASE INVOLVING NATIVE CORONARY ARTERY OF NATIVE HEART WITHOUT ANGINA PECTORIS: ICD-10-CM

## 2025-05-30 DIAGNOSIS — E78.2 MIXED HYPERLIPIDEMIA: ICD-10-CM

## 2025-05-30 LAB
ANION GAP SERPL CALCULATED.3IONS-SCNC: 5 MMOL/L (ref 4–13)
BNP SERPL-MCNC: 100 PG/ML (ref 0–100)
BUN SERPL-MCNC: 16 MG/DL (ref 5–25)
CALCIUM SERPL-MCNC: 9.3 MG/DL (ref 8.4–10.2)
CHLORIDE SERPL-SCNC: 105 MMOL/L (ref 96–108)
CHOLEST SERPL-MCNC: 122 MG/DL (ref ?–200)
CO2 SERPL-SCNC: 30 MMOL/L (ref 21–32)
CREAT SERPL-MCNC: 0.71 MG/DL (ref 0.6–1.3)
GFR SERPL CREATININE-BSD FRML MDRD: 90 ML/MIN/1.73SQ M
GLUCOSE P FAST SERPL-MCNC: 104 MG/DL (ref 65–99)
HDLC SERPL-MCNC: 37 MG/DL
LDLC SERPL CALC-MCNC: 61 MG/DL (ref 0–100)
NONHDLC SERPL-MCNC: 85 MG/DL
POTASSIUM SERPL-SCNC: 4.6 MMOL/L (ref 3.5–5.3)
SODIUM SERPL-SCNC: 140 MMOL/L (ref 135–147)
TRIGL SERPL-MCNC: 120 MG/DL (ref ?–150)

## 2025-05-30 PROCEDURE — 36415 COLL VENOUS BLD VENIPUNCTURE: CPT

## 2025-05-30 PROCEDURE — 83695 ASSAY OF LIPOPROTEIN(A): CPT

## 2025-05-30 PROCEDURE — 80061 LIPID PANEL: CPT

## 2025-05-30 PROCEDURE — 80048 BASIC METABOLIC PNL TOTAL CA: CPT

## 2025-05-30 PROCEDURE — 83880 ASSAY OF NATRIURETIC PEPTIDE: CPT

## 2025-06-02 LAB — LPA SERPL-SCNC: 206.9 NMOL/L

## 2025-06-03 ENCOUNTER — RESULTS FOLLOW-UP (OUTPATIENT)
Dept: CARDIOLOGY CLINIC | Facility: CLINIC | Age: 65
End: 2025-06-03

## 2025-06-09 DIAGNOSIS — I24.9 ACS (ACUTE CORONARY SYNDROME) (HCC): ICD-10-CM

## 2025-06-09 RX ORDER — CLOPIDOGREL BISULFATE 75 MG/1
75 TABLET ORAL DAILY
Qty: 90 TABLET | Refills: 1 | Status: SHIPPED | OUTPATIENT
Start: 2025-06-09

## 2025-06-10 ENCOUNTER — HOSPITAL ENCOUNTER (EMERGENCY)
Facility: HOSPITAL | Age: 65
Discharge: HOME | End: 2025-06-10
Attending: EMERGENCY MEDICINE | Admitting: EMERGENCY MEDICINE
Payer: COMMERCIAL

## 2025-06-10 VITALS
SYSTOLIC BLOOD PRESSURE: 135 MMHG | RESPIRATION RATE: 18 BRPM | TEMPERATURE: 98 F | DIASTOLIC BLOOD PRESSURE: 70 MMHG | HEART RATE: 65 BPM | OXYGEN SATURATION: 98 %

## 2025-06-10 DIAGNOSIS — R42 DIZZINESS: Primary | ICD-10-CM

## 2025-06-10 DIAGNOSIS — E86.0 DEHYDRATION: ICD-10-CM

## 2025-06-10 LAB
ALBUMIN SERPL BCG-MCNC: 4.7 G/DL (ref 3.5–5)
ALP SERPL-CCNC: 60 U/L (ref 34–104)
ALT SERPL W P-5'-P-CCNC: 30 U/L (ref 7–52)
ANION GAP SERPL CALCULATED.3IONS-SCNC: 9 MMOL/L (ref 4–13)
AST SERPL W P-5'-P-CCNC: 20 U/L (ref 13–39)
BASOPHILS # BLD AUTO: 0.04 THOUSANDS/ÂΜL (ref 0–0.1)
BASOPHILS NFR BLD AUTO: 1 % (ref 0–1)
BILIRUB SERPL-MCNC: 0.6 MG/DL (ref 0.2–1)
BUN SERPL-MCNC: 21 MG/DL (ref 5–25)
CALCIUM SERPL-MCNC: 9.5 MG/DL (ref 8.4–10.2)
CARDIAC TROPONIN I PNL SERPL HS: 3 NG/L (ref ?–50)
CHLORIDE SERPL-SCNC: 100 MMOL/L (ref 96–108)
CO2 SERPL-SCNC: 27 MMOL/L (ref 21–32)
CREAT SERPL-MCNC: 0.86 MG/DL (ref 0.6–1.3)
EOSINOPHIL # BLD AUTO: 0.19 THOUSAND/ÂΜL (ref 0–0.61)
EOSINOPHIL NFR BLD AUTO: 3 % (ref 0–6)
ERYTHROCYTE [DISTWIDTH] IN BLOOD BY AUTOMATED COUNT: 11.8 % (ref 11.6–15.1)
GFR SERPL CREATININE-BSD FRML MDRD: 71 ML/MIN/1.73SQ M
GLUCOSE SERPL-MCNC: 100 MG/DL (ref 65–140)
GLUCOSE SERPL-MCNC: 103 MG/DL (ref 65–140)
HCT VFR BLD AUTO: 38.5 % (ref 34.8–46.1)
HGB BLD-MCNC: 13.3 G/DL (ref 11.5–15.4)
IMM GRANULOCYTES # BLD AUTO: 0.01 THOUSAND/UL (ref 0–0.2)
IMM GRANULOCYTES NFR BLD AUTO: 0 % (ref 0–2)
LYMPHOCYTES # BLD AUTO: 1.83 THOUSANDS/ÂΜL (ref 0.6–4.47)
LYMPHOCYTES NFR BLD AUTO: 31 % (ref 14–44)
MCH RBC QN AUTO: 31.2 PG (ref 26.8–34.3)
MCHC RBC AUTO-ENTMCNC: 34.5 G/DL (ref 31.4–37.4)
MCV RBC AUTO: 90 FL (ref 82–98)
MONOCYTES # BLD AUTO: 0.7 THOUSAND/ÂΜL (ref 0.17–1.22)
MONOCYTES NFR BLD AUTO: 12 % (ref 4–12)
NEUTROPHILS # BLD AUTO: 3.16 THOUSANDS/ÂΜL (ref 1.85–7.62)
NEUTS SEG NFR BLD AUTO: 53 % (ref 43–75)
NRBC BLD AUTO-RTO: 0 /100 WBCS
PLATELET # BLD AUTO: 274 THOUSANDS/UL (ref 149–390)
PMV BLD AUTO: 9.3 FL (ref 8.9–12.7)
POTASSIUM SERPL-SCNC: 3.9 MMOL/L (ref 3.5–5.3)
PROT SERPL-MCNC: 8.1 G/DL (ref 6.4–8.4)
RBC # BLD AUTO: 4.26 MILLION/UL (ref 3.81–5.12)
SODIUM SERPL-SCNC: 136 MMOL/L (ref 135–147)
TSH SERPL DL<=0.05 MIU/L-ACNC: 2.63 UIU/ML (ref 0.45–4.5)
WBC # BLD AUTO: 5.93 THOUSAND/UL (ref 4.31–10.16)

## 2025-06-10 PROCEDURE — 99284 EMERGENCY DEPT VISIT MOD MDM: CPT | Performed by: EMERGENCY MEDICINE

## 2025-06-10 PROCEDURE — 36415 COLL VENOUS BLD VENIPUNCTURE: CPT

## 2025-06-10 PROCEDURE — 84484 ASSAY OF TROPONIN QUANT: CPT

## 2025-06-10 PROCEDURE — 93005 ELECTROCARDIOGRAM TRACING: CPT

## 2025-06-10 PROCEDURE — 80053 COMPREHEN METABOLIC PANEL: CPT

## 2025-06-10 PROCEDURE — 84443 ASSAY THYROID STIM HORMONE: CPT

## 2025-06-10 PROCEDURE — 82948 REAGENT STRIP/BLOOD GLUCOSE: CPT

## 2025-06-10 PROCEDURE — 85025 COMPLETE CBC W/AUTO DIFF WBC: CPT

## 2025-06-10 PROCEDURE — 99284 EMERGENCY DEPT VISIT MOD MDM: CPT

## 2025-06-10 PROCEDURE — 96360 HYDRATION IV INFUSION INIT: CPT

## 2025-06-10 RX ADMIN — SODIUM CHLORIDE 500 ML: 0.9 INJECTION, SOLUTION INTRAVENOUS at 15:36

## 2025-06-10 NOTE — ED ATTENDING ATTESTATION
6/10/2025  IWalter MD, saw and evaluated the patient. I have discussed the patient with the resident/non-physician practitioner and agree with the resident's/non-physician practitioner's findings, Plan of Care, and MDM as documented in the resident's/non-physician practitioner's note, except where noted. All available labs and Radiology studies were reviewed.  I was present for key portions of any procedure(s) performed by the resident/non-physician practitioner and I was immediately available to provide assistance.       At this point I agree with the current assessment done in the Emergency Department.  I have conducted an independent evaluation of this patient a history and physical is as follows:SEE H AND P ABOVE     ED Course  ED Course as of 06/10/25 1857   Tue Kraig 10, 2025   1456 Er md note- pre hosp ecg reviewed by er md -  nsr no signs of ischemia/ injury -reviewed and compared to er ecg- no sign changes   1458 Er md note- recent lab s all reviewed by er med    1503 er   1557 Er md note-  pt seen and thoroughly evaluated by er md- case d/w resident --  65 yr female with hx of nstemi 6/24--  cath report echo at that time and 7/24 nm stress test reports - recent cardiology notes all reviewed by er dm- pt in normal states fo health with no recent illness/ new meds/ symptoms of anykind -- was at work at day care - when had sudden sense of external motion - has been dx with vertigo- has meclizine  that seh takes prn --  took meclizine at work  with improvement-- no head/neck pain - no cp/sob/palpnear/syncope- no ab d/ b ack pain -- no diplopia / dysarthria /dysphagia/dysphonia/ dysmetria- NO RECent  exertional cp/ellison or any syncope- no v/d- no melena/b rbpr - current has no comps-- avss- pulse ox 98 % on ra- interpretation is normal- no intervention- mm pink- no pmt c/t/l/s spine - rrr s1/s2-cta-b /soft nt/nd- no hsm - no cva tenderness- no ascties- no peritoneal signs - no pulsatile ab d  mass/bruit/ tenderness- ticklish - equal bilateral radial/dp pulses- no ble edema/calf tendern ess/asym./ erythema - normal non focal neuro exam - normal cn exam- normal bue/ble strength/sensation - no nystagmus- neg test of sckew- normal finger to nose/heel to shin all bilaterally - no truncal ataxia    1608 Er md note- pt - re-eval -feels much improved-- tolerated liquids in er with no comps- pt and  made aware of normal workup-- likely peripheral vertigo episode and will ambulate and re-eval after ivfs are done and likely d/c          Critical Care Time  Procedures

## 2025-06-10 NOTE — ED PROVIDER NOTES
Time reflects when diagnosis was documented in both MDM as applicable and the Disposition within this note       Time User Action Codes Description Comment    6/10/2025  3:48 PM Walter Vera Add [E86.0] Dehydration     6/10/2025  3:48 PM Walter Vera Remove [E86.0] Dehydration     6/10/2025  4:19 PM Walter Vera [R42] Dizziness     6/10/2025  4:19 PM Walter Vera [E86.0] Dehydration           ED Disposition       ED Disposition   Discharge    Condition   Stable    Date/Time   Tue Kraig 10, 2025  4:19 PM    Comment   Siena SMITH Olmedo discharge to home/self care.                   Assessment & Plan       Medical Decision Making  65 year old female Hx of CAD s/p PCI, HTN, HLD, chronic vertigo presents to ED via EMS for lightheadedness and weakness while working at a . Normotensive, NSR, negative HINTs, no CN deficits, no numbness or weakness, denies headache, chest pain, back pain, nausea, vomiting, or diarrhea. Differential diagnosis include BPPV, Meniere's disease, dehydration, hypoglycemia, stroke, ACS, vasovagal syncope, arrhythmogenic syncope, and complex migraine.    Amount and/or Complexity of Data Reviewed  Labs: ordered. Decision-making details documented in ED Course.        ED Course as of 06/10/25 1622   Tue Kraig 10, 2025   1505 Patient seen and examined at bedside, labs drawn, EKG NSR. Endorses improvement of dizziness from symptom onset. No chest pain, headache, or shortness of breath.   1511 Fingerstick Glucose (POCT)  Normoglycemic.   1512 CBC and differential   1529 Comprehensive metabolic panel  WNL   1533 Patient feeling better, IVF initiated   1543 HS Troponin 0hr (reflex protocol)  Low, 3.   1622 IVF completed, patient continues to endorse improvement of dizziness. Ambulated in hallway without worsening of symptoms.       Medications   sodium chloride 0.9 % bolus 500 mL (500 mL Intravenous New Bag 6/10/25 1536)       ED Risk Strat Scores                    No data  "recorded                            History of Present Illness       Chief Complaint   Patient presents with    Weakness - Generalized     Arrives per EMS with report patient was working at  and became dizzy and weak around 1200. States \"Everything kept spinning. I tried to stand up and felt very weak\" Denies CP/SOB.        Past Medical History[1]   Past Surgical History[2]   Family History[3]   Social History[4]   E-Cigarette/Vaping    E-Cigarette Use Never Assessed       E-Cigarette/Vaping Substances    Nicotine No     THC No     CBD No     Flavoring No     Other No     Unknown No       I have reviewed and agree with the history as documented.     65-year-old female history of CAD, NSTEMI status post PCI, HTN, HLD, hypothyroidism presents via EMS after she felt weak and had near syncopal episode while working at a .  Patient states that she felt weaker this morning, thought that she might of been hungry however the feeling persisted after she had lunch, with sensations of dizziness where reported that it felt like the room was spinning, prompting a coworker to call EMS. She notes that prior to arrival of EMS she took a medication she was prescribed for vertigo, believes it is meclizine.  Arrival to ED patient afebrile, /70s, Patient chest pain, dysarthria, cough, nausea, vomiting, fever, recent illness or sick contacts.           Review of Systems   Constitutional:  Positive for fatigue. Negative for chills and fever.   HENT:  Negative for ear pain and sore throat.    Eyes:  Negative for pain and visual disturbance.   Respiratory:  Negative for cough and shortness of breath.    Cardiovascular:  Negative for chest pain and palpitations.   Gastrointestinal:  Negative for abdominal pain, diarrhea, nausea and vomiting.   Genitourinary:  Negative for dysuria and hematuria.   Musculoskeletal:  Negative for arthralgias and back pain.   Skin:  Negative for color change and rash.   Neurological:  " Positive for dizziness and light-headedness. Negative for seizures, syncope, weakness and numbness.   All other systems reviewed and are negative.          Objective       ED Triage Vitals   Temperature Pulse Blood Pressure Respirations SpO2 Patient Position - Orthostatic VS   06/10/25 1451 06/10/25 1445 06/10/25 1445 06/10/25 1445 06/10/25 1445 06/10/25 1445   98 °F (36.7 °C) 65 135/70 18 98 % Sitting      Temp Source Heart Rate Source BP Location FiO2 (%) Pain Score    06/10/25 1451 06/10/25 1445 06/10/25 1445 -- --    Oral Monitor Right arm        Vitals      Date and Time Temp Pulse SpO2 Resp BP Pain Score FACES Pain Rating User   06/10/25 1451 98 °F (36.7 °C) -- -- -- -- -- -- KD   06/10/25 1445 -- 65 98 % 18 135/70 -- -- KD            Physical Exam  Vitals and nursing note reviewed.   Constitutional:       General: She is not in acute distress.     Appearance: She is well-developed.   HENT:      Head: Normocephalic and atraumatic.     Eyes:      Conjunctiva/sclera: Conjunctivae normal.       Cardiovascular:      Rate and Rhythm: Normal rate and regular rhythm.      Heart sounds: No murmur heard.  Pulmonary:      Effort: Pulmonary effort is normal. No respiratory distress.      Breath sounds: Normal breath sounds.   Abdominal:      Palpations: Abdomen is soft.      Tenderness: There is no abdominal tenderness.     Musculoskeletal:         General: No swelling.      Cervical back: Neck supple.      Right lower leg: No edema.      Left lower leg: No edema.     Skin:     General: Skin is warm and dry.      Capillary Refill: Capillary refill takes less than 2 seconds.     Neurological:      General: No focal deficit present.      Mental Status: She is alert and oriented to person, place, and time.      Cranial Nerves: No cranial nerve deficit.      Sensory: No sensory deficit.      Motor: No weakness.      Coordination: Coordination normal.     Psychiatric:         Mood and Affect: Mood normal.         Results  Reviewed       Procedure Component Value Units Date/Time    HS Troponin I 4hr [[id number]]     Lab Status: No result Specimen: Blood     TSH [[id number]]  (Normal) Collected: 06/10/25 1458    Lab Status: Final result Specimen: Blood from Arm, Left Updated: 06/10/25 1545     TSH 3RD GENERATON 2.626 uIU/mL     HS Troponin 0hr (reflex protocol) [[id number]]  (Normal) Collected: 06/10/25 1458    Lab Status: Final result Specimen: Blood from Arm, Left Updated: 06/10/25 1534     hs TnI 0hr 3 ng/L     HS Troponin I 2hr [[id number]]     Lab Status: No result Specimen: Blood     Comprehensive metabolic panel [[id number]] Collected: 06/10/25 1458    Lab Status: Final result Specimen: Blood from Arm, Left Updated: 06/10/25 1525     Sodium 136 mmol/L      Potassium 3.9 mmol/L      Chloride 100 mmol/L      CO2 27 mmol/L      ANION GAP 9 mmol/L      BUN 21 mg/dL      Creatinine 0.86 mg/dL      Glucose 100 mg/dL      Calcium 9.5 mg/dL      AST 20 U/L      ALT 30 U/L      Alkaline Phosphatase 60 U/L      Total Protein 8.1 g/dL      Albumin 4.7 g/dL      Total Bilirubin 0.60 mg/dL      eGFR 71 ml/min/1.73sq m     Narrative:      [employer] Kid[employer] guidelines for Chronic Kidney Disease (CKD):     Stage 1 with normal or high GFR (GFR > 90 mL/min/1.73 square meters)    Stage 2 Mild CKD (GFR = 60-89 mL/min/1.73 square meters)    Stage 3A Moderate CKD (GFR = 45-59 mL/min/1.73 square meters)    Stage 3B Moderate CKD (GFR = 30-44 mL/min/1.73 square meters)    Stage 4 Severe CKD (GFR = 15-29 mL/min/1.73 square meters)    Stage 5 End Stage CKD (GFR <15 mL/min/1.73 square meters)  Note: GFR calculation is accurate only with a steady state creatinine    CBC and differential [[id number]] Collected: 06/10/25 1458    Lab Status: Final result Specimen: Blood from Arm, Left Updated: 06/10/25 1508     WBC 5.93 Thousand/uL      RBC 4.26 Million/uL      Hemoglobin 13.3 g/dL      Hematocrit 38.5 %      MCV 90 fL      MCH 31.2 pg      MCHC  34.5 g/dL      RDW 11.8 %      MPV 9.3 fL      Platelets 274 Thousands/uL      nRBC 0 /100 WBCs      Segmented % 53 %      Immature Grans % 0 %      Lymphocytes % 31 %      Monocytes % 12 %      Eosinophils Relative 3 %      Basophils Relative 1 %      Absolute Neutrophils 3.16 Thousands/µL      Absolute Immature Grans 0.01 Thousand/uL      Absolute Lymphocytes 1.83 Thousands/µL      Absolute Monocytes 0.70 Thousand/µL      Eosinophils Absolute 0.19 Thousand/µL      Basophils Absolute 0.04 Thousands/µL     Fingerstick Glucose (POCT) [[id number]]  (Normal) Collected: 06/10/25 1504    Lab Status: Final result Specimen: Blood Updated: 06/10/25 1505     POC Glucose 103 mg/dl             No orders to display       Procedures    ED Medication and Procedure Management   Prior to Admission Medications   Prescriptions Last Dose Informant Patient Reported? Taking?   Ascorbic Acid (vitamin C) 1000 MG tablet  Self, Spouse/Significant Other Yes No   Sig: Take 1,000 mg by mouth daily   Multiple Vitamins-Minerals (multivitamin with minerals) tablet  Self, Spouse/Significant Other Yes No   Sig: Take 1 tablet by mouth daily   aspirin 81 mg chewable tablet  Self, Spouse/Significant Other No No   Sig: Chew 1 tablet (81 mg total) daily   atorvastatin (LIPITOR) 80 mg tablet   No No   Sig: Take 1 tablet (80 mg total) by mouth daily   cholecalciferol (VITAMIN D3) 1,000 units tablet  Self, Spouse/Significant Other Yes No   Sig: Take 1,000 Units by mouth daily   clopidogrel (PLAVIX) 75 mg tablet   No No   Sig: take 1 tablet by mouth once daily   famotidine (PEPCID) 20 mg tablet  Self, Spouse/Significant Other No No   Sig: Take 1 tablet (20 mg total) by mouth daily for 5 days   levothyroxine 50 mcg tablet  Self, Spouse/Significant Other Yes No   Sig: Take 50 mcg by mouth daily   lidocaine (Lidoderm) 5 %  Self No No   Sig: Apply 1 patch topically over 12 hours daily for 5 days Remove & Discard patch within 12 hours or as directed by MD    Patient not taking: Reported on 8/22/2024   lidocaine (Lidoderm) 5 %   No No   Sig: Apply 1 patch topically over 12 hours daily for 7 days Remove & Discard patch within 12 hours or as directed by MD   Patient not taking: Reported on 5/8/2025   lisinopril (ZESTRIL) 10 mg tablet  Self, Spouse/Significant Other Yes No   Sig: Take 10 mg by mouth daily   lisinopril-hydrochlorothiazide (PRINZIDE,ZESTORETIC) 20-12.5 MG per tablet  Self, Spouse/Significant Other Yes No   Sig: Take 1 tablet by mouth daily   Patient not taking: Reported on 5/8/2025   meclizine (ANTIVERT) 25 mg tablet  Self, Spouse/Significant Other Yes No   Sig: Take 25 mg by mouth 3 (three) times a day as needed   methocarbamol (ROBAXIN) 500 mg tablet  Self, Spouse/Significant Other No No   Sig: Take 1 tablet (500 mg total) by mouth 2 (two) times a day   Patient not taking: Reported on 8/22/2024   metoprolol succinate (TOPROL-XL) 25 mg 24 hr tablet  Self, Spouse/Significant Other No No   Sig: Take 1 tablet (25 mg total) by mouth daily   metoprolol succinate (TOPROL-XL) 50 mg 24 hr tablet   No No   Sig: Take 1 tablet (50 mg total) by mouth daily   sucralfate (CARAFATE) 1 g tablet  Self No No   Sig: Take 1 tablet (1 g total) by mouth 4 (four) times a day for 7 days   Patient not taking: Reported on 7/11/2024   vitamin E 100 UNIT capsule  Self, Spouse/Significant Other Yes No   Sig: Take 100 Units by mouth daily      Facility-Administered Medications: None     Patient's Medications   Discharge Prescriptions    No medications on file     No discharge procedures on file.  ED SEPSIS DOCUMENTATION   Time reflects when diagnosis was documented in both MDM as applicable and the Disposition within this note       Time User Action Codes Description Comment    6/10/2025  3:48 PM Walter Vera [E86.0] Dehydration     6/10/2025  3:48 PM Walter Vera Remove [E86.0] Dehydration     6/10/2025  4:19 PM Walter Vera [R42] Dizziness     6/10/2025  4:19 PM Jody  Walter Cárdenas [E86.0] Dehydration                    Walter Vera DO  06/10/25 1621         [1]   Past Medical History:  Diagnosis Date    Disease of thyroid gland     High cholesterol     Hypertension     Vertigo    [2]   Past Surgical History:  Procedure Laterality Date    CARDIAC CATHETERIZATION N/A 6/3/2024    Procedure: Cardiac pci;  Surgeon: Mele Carlson MD;  Location: [facility];  Service: Cardiology    CARDIAC CATHETERIZATION N/A 6/3/2024    Procedure: Cardiac Coronary Angiogram;  Surgeon: Mele Carlson MD;  Location: [facility];  Service: Cardiology   [3] No family history on file.  [4]   Social History  Tobacco Use    Smoking status: Never    Smokeless tobacco: Never   Substance Use Topics    Alcohol use: Not Currently    Drug use: Not Currently        Walter Vera DO  06/10/25 1622

## 2025-06-10 NOTE — Clinical Note
Siena Olmedo was seen and treated in our emergency department on 6/10/2025.    No restrictions            Diagnosis:     Siena  is off the rest of the shift today, may return to work on return date.    She may return on this date: 06/11/2025         If you have any questions or concerns, please don't hesitate to call.      Walter Vera, DO    ______________________________           _______________          _______________  Hospital Representative                              Date                                Time

## 2025-06-10 NOTE — ED PROCEDURE NOTE
PROCEDURE  ECG 12 Lead Documentation Only    Date/Time: 6/10/2025 3:30 PM    Performed by: Walter Epperson MD  Authorized by: Walter Epperson MD    Indications / Diagnosis:  Dizziness- room spinning-  ECG reviewed by me, the ED Provider: yes    Patient location:  ED  Previous ECG:     Previous ECG:  Compared to current    Comparison ECG info:  6/21/24- inferiro t wave inversion has resolved- no other sign chagnes    Similarity:  Changes noted    Comparison to cardiac monitor: Yes    Interpretation:     Interpretation: non-specific    Rate:     ECG rate:  62    ECG rate assessment: normal    Rhythm:     Rhythm: sinus rhythm    Ectopy:     Ectopy: none    QRS:     QRS axis:  Normal    QRS intervals:  Normal  Conduction:     Conduction: normal    ST segments:     ST segments:  Normal  T waves:     T waves: flattening      Flattening:  III, aVF and V1  Q waves:     Q waves:  III  Other findings:     Other findings: U wave    Comments:      No ecg signs of ischemia/injury / r heart strain / lissette/pericarditis - no ecg signs of short- long qtc- wpw- brugada syndrome'/ hcm - epsilon waves        Walter Epperson MD  06/10/25 5729

## 2025-06-11 LAB
ATRIAL RATE: 62 BPM
P AXIS: 42 DEGREES
PR INTERVAL: 142 MS
QRS AXIS: -7 DEGREES
QRSD INTERVAL: 78 MS
QT INTERVAL: 430 MS
QTC INTERVAL: 436 MS
T WAVE AXIS: 8 DEGREES
VENTRICULAR RATE: 62 BPM

## 2025-06-11 PROCEDURE — 93010 ELECTROCARDIOGRAM REPORT: CPT | Performed by: INTERNAL MEDICINE

## 2025-06-18 ENCOUNTER — NURSE TRIAGE (OUTPATIENT)
Age: 65
End: 2025-06-18

## 2025-06-18 ENCOUNTER — HOSPITAL ENCOUNTER (OUTPATIENT)
Dept: MAMMOGRAPHY | Facility: HOSPITAL | Age: 65
Discharge: HOME | End: 2025-06-18
Payer: COMMERCIAL

## 2025-06-18 VITALS — BODY MASS INDEX: 25.03 KG/M2 | HEIGHT: 62 IN | WEIGHT: 136 LBS

## 2025-06-18 DIAGNOSIS — I25.10 CORONARY ARTERY DISEASE INVOLVING NATIVE CORONARY ARTERY OF NATIVE HEART WITHOUT ANGINA PECTORIS: ICD-10-CM

## 2025-06-18 DIAGNOSIS — I10 BENIGN HYPERTENSION: ICD-10-CM

## 2025-06-18 DIAGNOSIS — Z12.31 ENCOUNTER FOR SCREENING MAMMOGRAM FOR MALIGNANT NEOPLASM OF BREAST: ICD-10-CM

## 2025-06-18 PROCEDURE — 77067 SCR MAMMO BI INCL CAD: CPT

## 2025-06-18 PROCEDURE — 77063 BREAST TOMOSYNTHESIS BI: CPT

## 2025-06-18 RX ORDER — METOPROLOL SUCCINATE 25 MG/1
25 TABLET, EXTENDED RELEASE ORAL DAILY
Qty: 90 TABLET | Refills: 1 | Status: SHIPPED | OUTPATIENT
Start: 2025-06-18

## 2025-06-18 NOTE — TELEPHONE ENCOUNTER
"REASON FOR CONVERSATION: Medication Problem    SYMPTOMS: n/a    OTHER HEALTH INFORMATION:   Last office visit had been discuss can stop plavix at one yr June 1 st.    PROTOCOL DISPOSITION: Callback by PCP Today    CARE ADVICE PROVIDED:   Discuss with provider    PRACTICE FOLLOW-UP:   Follow up with pt on recommendations on Plavix script      Reason for Disposition   Caller has NON-URGENT medicine question about med that PCP or specialist prescribed and triager unable to answer question    Answer Assessment - Initial Assessment Questions  1. NAME of MEDICINE: \"What medicine(s) are you calling about?\"      Plavix   2. QUESTION: \"What is your question?\" (e.g., double dose of medicine, side effect)      One year was on June 1 st, and they picked up the plavix script at the pharmacy      They are asking do they need to continue Plavix since was told that at one yr it could be discontinued?  Siena was not understanding how it got refilled, cause she was suppose to stop it June 1 st.   Explained to the spouse and the  that pharmacy had sent over a refill request and that is how it got refilled. Advised that unless the pharamacy is specifically told they aren't aware.    Please review    Protocols used: Medication Question Call-Adult-[place]    "

## 2025-06-19 NOTE — TELEPHONE ENCOUNTER
It would be up to her as to whether she wants to continue the Plavix or stop it at the 1 year jaden.  By all means, she is safe to do so, however the continued Plavix will help stabilize the other lesions she has in the arteries of her heart that were left for medical management.

## 2025-06-19 NOTE — TELEPHONE ENCOUNTER
Received a call from Joan was calling back to discuss recommendations from Dr Jones, went over the message pt verbally understood   No

## 2025-06-26 ENCOUNTER — HOSPITAL ENCOUNTER (OUTPATIENT)
Dept: NON INVASIVE DIAGNOSTICS | Facility: CLINIC | Age: 65
Discharge: HOME | End: 2025-06-26
Attending: NURSE PRACTITIONER
Payer: COMMERCIAL

## 2025-06-26 VITALS
HEART RATE: 60 BPM | BODY MASS INDEX: 25.03 KG/M2 | HEIGHT: 62 IN | WEIGHT: 136.02 LBS | DIASTOLIC BLOOD PRESSURE: 70 MMHG | SYSTOLIC BLOOD PRESSURE: 135 MMHG

## 2025-06-26 DIAGNOSIS — I10 BENIGN HYPERTENSION: ICD-10-CM

## 2025-06-26 DIAGNOSIS — I25.10 CORONARY ARTERY DISEASE INVOLVING NATIVE CORONARY ARTERY OF NATIVE HEART WITHOUT ANGINA PECTORIS: ICD-10-CM

## 2025-06-26 DIAGNOSIS — E78.2 MIXED HYPERLIPIDEMIA: ICD-10-CM

## 2025-06-26 DIAGNOSIS — Z95.5 STATUS POST INSERTION OF DRUG ELUTING CORONARY ARTERY STENT: ICD-10-CM

## 2025-06-26 LAB
AORTIC ROOT: 2.8 CM
ASCENDING AORTA: 2.8 CM
BSA FOR ECHO PROCEDURE: 1.62 M2
FRACTIONAL SHORTENING: 30 (ref 28–44)
INTERVENTRICULAR SEPTUM IN DIASTOLE (PARASTERNAL SHORT AXIS VIEW): 0.9 CM
INTERVENTRICULAR SEPTUM: 0.9 CM (ref 0.6–1.1)
LAAS-AP4: 17.4 CM2
LEFT ATRIUM AREA SYSTOLE SINGLE PLANE A4C: 18.3 CM2
LEFT ATRIUM SIZE: 3.4 CM
LEFT INTERNAL DIMENSION IN SYSTOLE: 3.2 CM (ref 2.1–4)
LEFT VENTRICLE DIASTOLIC VOLUME (MOD BIPLANE): 76 ML
LEFT VENTRICLE DIASTOLIC VOLUME INDEX (MOD BIPLANE): 46.9 ML/M2
LEFT VENTRICLE SYSTOLIC VOLUME (MOD BIPLANE): 35 ML
LEFT VENTRICLE SYSTOLIC VOLUME INDEX (MOD BIPLANE): 21.6 ML/M2
LEFT VENTRICULAR INTERNAL DIMENSION IN DIASTOLE: 4.6 CM (ref 3.5–6)
LEFT VENTRICULAR POSTERIOR WALL IN END DIASTOLE: 0.9 CM
LEFT VENTRICULAR STROKE VOLUME: 56 ML
LV EF BIPLANE MOD: 54 %
LV EF US.2D.A4C+ESTIMATED: 56 %
LVSV (TEICH): 56 ML
RIGHT ATRIUM AREA SYSTOLE A4C: 9.5 CM2
RIGHT VENTRICLE ID DIMENSION: 3.5 CM
SL CV LV EF: 54
SL CV PED ECHO LEFT VENTRICLE DIASTOLIC VOLUME (MOD BIPLANE) 2D: 96 ML
SL CV PED ECHO LEFT VENTRICLE SYSTOLIC VOLUME (MOD BIPLANE) 2D: 40 ML
TR MAX PG: 18 MMHG
TR PEAK VELOCITY: 2.1 M/S
TRICUSPID VALVE PEAK REGURGITATION VELOCITY: 2.13 M/S

## 2025-06-26 PROCEDURE — 93306 TTE W/DOPPLER COMPLETE: CPT

## 2025-06-26 PROCEDURE — 93306 TTE W/DOPPLER COMPLETE: CPT | Performed by: INTERNAL MEDICINE

## 2025-07-28 ENCOUNTER — TELEPHONE (OUTPATIENT)
Dept: MAMMOGRAPHY | Facility: CLINIC | Age: 65
End: 2025-07-28

## (undated) DEVICE — CATH GUIDE LAUNCHER 5FR JR 4.0

## (undated) DEVICE — GUIDEWIRE WHOLEY HI TORQUE INTERM MOD J .035 145CM

## (undated) DEVICE — MICROPUNCTURE INTRODUCER SET SILHOUETTE TRANSITIONLESS PUSH-PLUS DESIGN - STIFFENED CANNULA WITH NITINOL WIRE GUIDE: Brand: MICROPUNCTURE

## (undated) DEVICE — GLIDESHEATH SLENDER STAINLESS STEEL KIT: Brand: GLIDESHEATH SLENDER

## (undated) DEVICE — TR BAND RADIAL ARTERY COMPRESSION DEVICE: Brand: TR BAND

## (undated) DEVICE — RADIFOCUS OPTITORQUE ANGIOGRAPHIC CATHETER: Brand: OPTITORQUE

## (undated) DEVICE — RUNTHROUGH NS EXTRA FLOPPY PTCA GUIDEWIRE: Brand: RUNTHROUGH

## (undated) DEVICE — CATH GUIDE LAUNCHER 6FR JR4 110CM

## (undated) DEVICE — BALLOON EUPHORA RX 2.25 X 15MM

## (undated) DEVICE — PINNACLE INTRODUCER SHEATH: Brand: PINNACLE

## (undated) DEVICE — Device: Brand: ASAHI SILVERWAY